# Patient Record
Sex: FEMALE | Race: WHITE | Employment: UNEMPLOYED | URBAN - METROPOLITAN AREA
[De-identification: names, ages, dates, MRNs, and addresses within clinical notes are randomized per-mention and may not be internally consistent; named-entity substitution may affect disease eponyms.]

---

## 2018-02-14 ENCOUNTER — HOSPITAL ENCOUNTER (OUTPATIENT)
Age: 58
Setting detail: OBSERVATION
Discharge: HOME | End: 2018-02-15
Attending: EMERGENCY MEDICINE | Admitting: INTERNAL MEDICINE
Payer: COMMERCIAL

## 2018-02-14 ENCOUNTER — APPOINTMENT (OUTPATIENT)
Dept: ULTRASOUND IMAGING | Age: 58
End: 2018-02-14
Payer: COMMERCIAL

## 2018-02-14 ENCOUNTER — APPOINTMENT (OUTPATIENT)
Dept: NUCLEAR MEDICINE | Age: 58
End: 2018-02-14
Payer: COMMERCIAL

## 2018-02-14 ENCOUNTER — APPOINTMENT (OUTPATIENT)
Dept: CT IMAGING | Age: 58
End: 2018-02-14
Payer: COMMERCIAL

## 2018-02-14 DIAGNOSIS — R74.8 ELEVATED ALKALINE PHOSPHATASE LEVEL: ICD-10-CM

## 2018-02-14 DIAGNOSIS — F17.200 SMOKING ADDICTION: ICD-10-CM

## 2018-02-14 DIAGNOSIS — R10.11 RUQ ABDOMINAL PAIN: Primary | ICD-10-CM

## 2018-02-14 LAB
-: ABNORMAL
ABSOLUTE EOS #: 0.1 K/UL (ref 0–0.4)
ABSOLUTE IMMATURE GRANULOCYTE: ABNORMAL K/UL (ref 0–0.3)
ABSOLUTE LYMPH #: 2.5 K/UL (ref 1–4.8)
ABSOLUTE MONO #: 0.9 K/UL (ref 0.1–1.3)
ALBUMIN SERPL-MCNC: 3.8 G/DL (ref 3.5–5.2)
ALBUMIN/GLOBULIN RATIO: ABNORMAL (ref 1–2.5)
ALP BLD-CCNC: 137 U/L (ref 35–104)
ALT SERPL-CCNC: 20 U/L (ref 5–33)
AMORPHOUS: ABNORMAL
ANION GAP SERPL CALCULATED.3IONS-SCNC: 11 MMOL/L (ref 9–17)
AST SERPL-CCNC: 21 U/L
BACTERIA: ABNORMAL
BASOPHILS # BLD: 0 % (ref 0–2)
BASOPHILS ABSOLUTE: 0 K/UL (ref 0–0.2)
BILIRUB SERPL-MCNC: 0.51 MG/DL (ref 0.3–1.2)
BILIRUBIN DIRECT: 0.14 MG/DL
BILIRUBIN URINE: NEGATIVE
BILIRUBIN, INDIRECT: 0.37 MG/DL (ref 0–1)
BUN BLDV-MCNC: 12 MG/DL (ref 6–20)
BUN/CREAT BLD: ABNORMAL (ref 9–20)
CALCIUM SERPL-MCNC: 9.1 MG/DL (ref 8.6–10.4)
CASTS UA: ABNORMAL /LPF
CHLORIDE BLD-SCNC: 102 MMOL/L (ref 98–107)
CO2: 28 MMOL/L (ref 20–31)
COLOR: YELLOW
COMMENT UA: ABNORMAL
CREAT SERPL-MCNC: 0.82 MG/DL (ref 0.5–0.9)
CRYSTALS, UA: ABNORMAL /HPF
DIFFERENTIAL TYPE: ABNORMAL
EOSINOPHILS RELATIVE PERCENT: 1 % (ref 0–4)
EPITHELIAL CELLS UA: ABNORMAL /HPF
GFR AFRICAN AMERICAN: >60 ML/MIN
GFR NON-AFRICAN AMERICAN: >60 ML/MIN
GFR SERPL CREATININE-BSD FRML MDRD: ABNORMAL ML/MIN/{1.73_M2}
GFR SERPL CREATININE-BSD FRML MDRD: ABNORMAL ML/MIN/{1.73_M2}
GLOBULIN: ABNORMAL G/DL (ref 1.5–3.8)
GLUCOSE BLD-MCNC: 123 MG/DL (ref 70–99)
GLUCOSE URINE: NEGATIVE
HCT VFR BLD CALC: 45.6 % (ref 36–46)
HEMOGLOBIN: 15.7 G/DL (ref 12–16)
IMMATURE GRANULOCYTES: ABNORMAL %
KETONES, URINE: NEGATIVE
LEUKOCYTE ESTERASE, URINE: NEGATIVE
LIPASE: 22 U/L (ref 13–60)
LYMPHOCYTES # BLD: 26 % (ref 24–44)
MCH RBC QN AUTO: 33.2 PG (ref 26–34)
MCHC RBC AUTO-ENTMCNC: 34.4 G/DL (ref 31–37)
MCV RBC AUTO: 96.6 FL (ref 80–100)
MONOCYTES # BLD: 10 % (ref 1–7)
MUCUS: ABNORMAL
NITRITE, URINE: NEGATIVE
NRBC AUTOMATED: ABNORMAL PER 100 WBC
OTHER OBSERVATIONS UA: ABNORMAL
PDW BLD-RTO: 14.5 % (ref 11.5–14.9)
PH UA: 5.5 (ref 5–8)
PLATELET # BLD: 266 K/UL (ref 150–450)
PLATELET ESTIMATE: ABNORMAL
PMV BLD AUTO: 8.8 FL (ref 6–12)
POTASSIUM SERPL-SCNC: 3.7 MMOL/L (ref 3.7–5.3)
PROTEIN UA: NEGATIVE
RBC # BLD: 4.73 M/UL (ref 4–5.2)
RBC # BLD: ABNORMAL 10*6/UL
RBC UA: ABNORMAL /HPF
RENAL EPITHELIAL, UA: ABNORMAL /HPF
SEG NEUTROPHILS: 63 % (ref 36–66)
SEGMENTED NEUTROPHILS ABSOLUTE COUNT: 6.2 K/UL (ref 1.3–9.1)
SODIUM BLD-SCNC: 141 MMOL/L (ref 135–144)
SPECIFIC GRAVITY UA: 1.02 (ref 1–1.03)
TOTAL PROTEIN: 6.9 G/DL (ref 6.4–8.3)
TRICHOMONAS: ABNORMAL
TURBIDITY: ABNORMAL
URINE HGB: ABNORMAL
UROBILINOGEN, URINE: NORMAL
WBC # BLD: 9.7 K/UL (ref 3.5–11)
WBC # BLD: ABNORMAL 10*3/UL
WBC UA: ABNORMAL /HPF
YEAST: ABNORMAL

## 2018-02-14 PROCEDURE — 96376 TX/PRO/DX INJ SAME DRUG ADON: CPT

## 2018-02-14 PROCEDURE — 78226 HEPATOBILIARY SYSTEM IMAGING: CPT

## 2018-02-14 PROCEDURE — 6360000002 HC RX W HCPCS: Performed by: HOSPITALIST

## 2018-02-14 PROCEDURE — A9537 TC99M MEBROFENIN: HCPCS | Performed by: INTERNAL MEDICINE

## 2018-02-14 PROCEDURE — 99285 EMERGENCY DEPT VISIT HI MDM: CPT

## 2018-02-14 PROCEDURE — G0378 HOSPITAL OBSERVATION PER HR: HCPCS

## 2018-02-14 PROCEDURE — 96374 THER/PROPH/DIAG INJ IV PUSH: CPT

## 2018-02-14 PROCEDURE — 74176 CT ABD & PELVIS W/O CONTRAST: CPT

## 2018-02-14 PROCEDURE — 6370000000 HC RX 637 (ALT 250 FOR IP): Performed by: HOSPITALIST

## 2018-02-14 PROCEDURE — 96375 TX/PRO/DX INJ NEW DRUG ADDON: CPT

## 2018-02-14 PROCEDURE — C9113 INJ PANTOPRAZOLE SODIUM, VIA: HCPCS | Performed by: HOSPITALIST

## 2018-02-14 PROCEDURE — 81001 URINALYSIS AUTO W/SCOPE: CPT

## 2018-02-14 PROCEDURE — 85025 COMPLETE CBC W/AUTO DIFF WBC: CPT

## 2018-02-14 PROCEDURE — 6360000002 HC RX W HCPCS: Performed by: INTERNAL MEDICINE

## 2018-02-14 PROCEDURE — 83690 ASSAY OF LIPASE: CPT

## 2018-02-14 PROCEDURE — 87086 URINE CULTURE/COLONY COUNT: CPT

## 2018-02-14 PROCEDURE — 96372 THER/PROPH/DIAG INJ SC/IM: CPT

## 2018-02-14 PROCEDURE — 76705 ECHO EXAM OF ABDOMEN: CPT

## 2018-02-14 PROCEDURE — 99223 1ST HOSP IP/OBS HIGH 75: CPT | Performed by: INTERNAL MEDICINE

## 2018-02-14 PROCEDURE — 80048 BASIC METABOLIC PNL TOTAL CA: CPT

## 2018-02-14 PROCEDURE — 80076 HEPATIC FUNCTION PANEL: CPT

## 2018-02-14 PROCEDURE — 36415 COLL VENOUS BLD VENIPUNCTURE: CPT

## 2018-02-14 PROCEDURE — 2580000003 HC RX 258: Performed by: INTERNAL MEDICINE

## 2018-02-14 PROCEDURE — 6360000002 HC RX W HCPCS: Performed by: EMERGENCY MEDICINE

## 2018-02-14 PROCEDURE — 3430000000 HC RX DIAGNOSTIC RADIOPHARMACEUTICAL: Performed by: INTERNAL MEDICINE

## 2018-02-14 RX ORDER — CITALOPRAM 40 MG/1
40 TABLET ORAL DAILY
Status: DISCONTINUED | OUTPATIENT
Start: 2018-02-14 | End: 2018-02-15 | Stop reason: HOSPADM

## 2018-02-14 RX ORDER — ASPIRIN 81 MG/1
81 TABLET ORAL DAILY
Status: DISCONTINUED | OUTPATIENT
Start: 2018-02-14 | End: 2018-02-15 | Stop reason: HOSPADM

## 2018-02-14 RX ORDER — SODIUM CHLORIDE 0.9 % (FLUSH) 0.9 %
10 SYRINGE (ML) INJECTION EVERY 12 HOURS SCHEDULED
Status: DISCONTINUED | OUTPATIENT
Start: 2018-02-14 | End: 2018-02-15 | Stop reason: HOSPADM

## 2018-02-14 RX ORDER — ATORVASTATIN CALCIUM 10 MG/1
10 TABLET, FILM COATED ORAL NIGHTLY
Status: DISCONTINUED | OUTPATIENT
Start: 2018-02-14 | End: 2018-02-15 | Stop reason: HOSPADM

## 2018-02-14 RX ORDER — SODIUM CHLORIDE 0.9 % (FLUSH) 0.9 %
10 SYRINGE (ML) INJECTION PRN
Status: DISCONTINUED | OUTPATIENT
Start: 2018-02-14 | End: 2018-02-15 | Stop reason: HOSPADM

## 2018-02-14 RX ORDER — NITROFURANTOIN 25; 75 MG/1; MG/1
100 CAPSULE ORAL
Status: ON HOLD | COMMUNITY
Start: 2018-02-05 | End: 2018-02-15 | Stop reason: HOSPADM

## 2018-02-14 RX ORDER — PANTOPRAZOLE SODIUM 40 MG/10ML
40 INJECTION, POWDER, LYOPHILIZED, FOR SOLUTION INTRAVENOUS 2 TIMES DAILY
Status: DISCONTINUED | OUTPATIENT
Start: 2018-02-14 | End: 2018-02-15 | Stop reason: HOSPADM

## 2018-02-14 RX ORDER — VENLAFAXINE HYDROCHLORIDE 75 MG/1
225 CAPSULE, EXTENDED RELEASE ORAL
Status: DISCONTINUED | OUTPATIENT
Start: 2018-02-15 | End: 2018-02-15 | Stop reason: HOSPADM

## 2018-02-14 RX ORDER — ONDANSETRON 2 MG/ML
4 INJECTION INTRAMUSCULAR; INTRAVENOUS EVERY 8 HOURS PRN
Status: DISCONTINUED | OUTPATIENT
Start: 2018-02-14 | End: 2018-02-15 | Stop reason: HOSPADM

## 2018-02-14 RX ORDER — VENLAFAXINE HYDROCHLORIDE 225 MG/1
225 TABLET, EXTENDED RELEASE ORAL
COMMUNITY

## 2018-02-14 RX ORDER — 0.9 % SODIUM CHLORIDE 0.9 %
10 VIAL (ML) INJECTION DAILY
Status: DISCONTINUED | OUTPATIENT
Start: 2018-02-14 | End: 2018-02-15 | Stop reason: HOSPADM

## 2018-02-14 RX ORDER — FENTANYL CITRATE 50 UG/ML
75 INJECTION, SOLUTION INTRAMUSCULAR; INTRAVENOUS ONCE
Status: COMPLETED | OUTPATIENT
Start: 2018-02-14 | End: 2018-02-14

## 2018-02-14 RX ORDER — ERGOCALCIFEROL 1.25 MG/1
50000 CAPSULE ORAL WEEKLY
COMMUNITY

## 2018-02-14 RX ORDER — VERAPAMIL HYDROCHLORIDE 120 MG/1
120 TABLET, FILM COATED ORAL DAILY
Status: DISCONTINUED | OUTPATIENT
Start: 2018-02-14 | End: 2018-02-15 | Stop reason: HOSPADM

## 2018-02-14 RX ORDER — FENTANYL CITRATE 50 UG/ML
50 INJECTION, SOLUTION INTRAMUSCULAR; INTRAVENOUS
Status: DISCONTINUED | OUTPATIENT
Start: 2018-02-14 | End: 2018-02-15 | Stop reason: HOSPADM

## 2018-02-14 RX ORDER — ALPRAZOLAM 0.5 MG/1
0.5 TABLET ORAL NIGHTLY PRN
COMMUNITY

## 2018-02-14 RX ORDER — FENTANYL CITRATE 50 UG/ML
25 INJECTION, SOLUTION INTRAMUSCULAR; INTRAVENOUS
Status: DISCONTINUED | OUTPATIENT
Start: 2018-02-14 | End: 2018-02-15 | Stop reason: HOSPADM

## 2018-02-14 RX ORDER — ONDANSETRON 2 MG/ML
4 INJECTION INTRAMUSCULAR; INTRAVENOUS ONCE
Status: COMPLETED | OUTPATIENT
Start: 2018-02-14 | End: 2018-02-14

## 2018-02-14 RX ORDER — SODIUM CHLORIDE 9 MG/ML
INJECTION, SOLUTION INTRAVENOUS CONTINUOUS
Status: DISCONTINUED | OUTPATIENT
Start: 2018-02-14 | End: 2018-02-15 | Stop reason: HOSPADM

## 2018-02-14 RX ORDER — ACETAMINOPHEN 325 MG/1
650 TABLET ORAL EVERY 4 HOURS PRN
Status: DISCONTINUED | OUTPATIENT
Start: 2018-02-14 | End: 2018-02-15 | Stop reason: HOSPADM

## 2018-02-14 RX ADMIN — PANTOPRAZOLE SODIUM 40 MG: 40 INJECTION, POWDER, FOR SOLUTION INTRAVENOUS at 21:51

## 2018-02-14 RX ADMIN — FENTANYL CITRATE 75 MCG: 50 INJECTION, SOLUTION INTRAMUSCULAR; INTRAVENOUS at 06:59

## 2018-02-14 RX ADMIN — ONDANSETRON 4 MG: 2 INJECTION INTRAMUSCULAR; INTRAVENOUS at 05:00

## 2018-02-14 RX ADMIN — ASPIRIN 81 MG: 81 TABLET, COATED ORAL at 14:41

## 2018-02-14 RX ADMIN — ATORVASTATIN CALCIUM 10 MG: 10 TABLET, FILM COATED ORAL at 21:51

## 2018-02-14 RX ADMIN — Medication 10 ML: at 11:03

## 2018-02-14 RX ADMIN — FENTANYL CITRATE 50 MCG: 50 INJECTION INTRAMUSCULAR; INTRAVENOUS at 21:51

## 2018-02-14 RX ADMIN — CITALOPRAM HYDROBROMIDE 40 MG: 40 TABLET ORAL at 14:41

## 2018-02-14 RX ADMIN — FENTANYL CITRATE 75 MCG: 50 INJECTION, SOLUTION INTRAMUSCULAR; INTRAVENOUS at 05:00

## 2018-02-14 RX ADMIN — Medication 10 ML: at 21:51

## 2018-02-14 RX ADMIN — SODIUM CHLORIDE: 9 INJECTION, SOLUTION INTRAVENOUS at 09:40

## 2018-02-14 RX ADMIN — ONDANSETRON 4 MG: 2 INJECTION INTRAMUSCULAR; INTRAVENOUS at 15:53

## 2018-02-14 RX ADMIN — Medication 3.2 MILLICURIE: at 11:03

## 2018-02-14 RX ADMIN — FENTANYL CITRATE 50 MCG: 50 INJECTION INTRAMUSCULAR; INTRAVENOUS at 13:37

## 2018-02-14 RX ADMIN — ENOXAPARIN SODIUM 40 MG: 40 INJECTION SUBCUTANEOUS at 09:40

## 2018-02-14 RX ADMIN — SODIUM CHLORIDE: 9 INJECTION, SOLUTION INTRAVENOUS at 16:49

## 2018-02-14 RX ADMIN — VERAPAMIL HYDROCHLORIDE 120 MG: 120 TABLET, FILM COATED ORAL at 14:41

## 2018-02-14 ASSESSMENT — ENCOUNTER SYMPTOMS
ABDOMINAL PAIN: 1
VOMITING: 0
COUGH: 0
BACK PAIN: 0
NAUSEA: 1
SHORTNESS OF BREATH: 0
DIARRHEA: 0
EYE PAIN: 0
SORE THROAT: 0

## 2018-02-14 ASSESSMENT — PAIN SCALES - GENERAL
PAINLEVEL_OUTOF10: 6
PAINLEVEL_OUTOF10: 8
PAINLEVEL_OUTOF10: 0
PAINLEVEL_OUTOF10: 8
PAINLEVEL_OUTOF10: 10

## 2018-02-14 ASSESSMENT — PAIN DESCRIPTION - LOCATION: LOCATION: ABDOMEN

## 2018-02-14 ASSESSMENT — PAIN DESCRIPTION - ORIENTATION: ORIENTATION: RIGHT;UPPER

## 2018-02-14 ASSESSMENT — PAIN DESCRIPTION - PAIN TYPE: TYPE: ACUTE PAIN

## 2018-02-14 ASSESSMENT — PAIN DESCRIPTION - DESCRIPTORS: DESCRIPTORS: SHARP

## 2018-02-14 NOTE — ED NOTES
Pt ambulates to bathroom with a steady gait. UA collected and sent to lab.         Dee Dee Johnson RN  02/14/18 8303

## 2018-02-14 NOTE — H&P
Pain.  citalopram (CELEXA) 40 MG tablet, Take 40 mg by mouth daily. Allergies:  Banana and Hydrocodone-acetaminophen    SOCIAL HISTORY:   Patient denies any smoking, alcohol or recreational drug    FAMILY HISTORY:       Problem Relation Age of Onset    Cancer Father        REVIEW OF SYSTEMS:  Constitutional: Negative for chills and fever. HENT: Negative for ear pain and sore throat. Eyes: Negative for pain and visual disturbance. Respiratory: Negative for cough and shortness of breath. Cardiovascular: Negative for chest pain. Gastrointestinal: Positive for abdominal pain and nausea. Negative for diarrhea and vomiting. Endocrine: Negative for polydipsia and polyuria. Genitourinary: Positive for flank pain. Negative for dysuria, hematuria and vaginal discharge. Musculoskeletal: Negative for arthralgias and back pain. Skin: Negative for rash. Allergic/Immunologic: Negative for food allergies. Neurological: Negative for weakness, numbness and headaches. Hematological: Does not bruise/bleed easily. Psychiatric/Behavioral: Negative for self-injury and suicidal ideas. The patient is not nervous/anxious. PHYSICAL EXAM:  BP (!) 141/77   Pulse 75   Temp 98.2 °F (36.8 °C) (Oral)   Resp 16   Ht 5' 5\" (1.651 m)   Wt 176 lb 5.9 oz (80 kg)   SpO2 94%   BMI 29.35 kg/m²   PMH:  has a past medical history of Arthritis; Asthma; and Hypertension. Surgical History:  has a past surgical history that includes lipoma resection (Right) and Appendectomy. Social History:  reports that she has been smoking Cigarettes. She has a 40.00 pack-year smoking history. She has never used smokeless tobacco. She reports that she drinks about 0.6 oz of alcohol per week . She reports that she does not use drugs.   Family History: Noncontributory at this time  Psychiatric History: Noncontributory at this time     Allergies:has No Known Allergies.         DATA:  US GALLBLADDER RUQ [[id number]] Collected: 02/14/18 4810   Updated: 02/14/18 0744    Narrative:     EXAMINATION:  RIGHT UPPER QUADRANT ULTRASOUND    2/14/2018 7:08 am    COMPARISON:  Renal stone protocol CT from 02/14/2018    HISTORY:  ORDERING SYSTEM PROVIDED HISTORY: RUQ abd pain  TECHNOLOGIST PROVIDED HISTORY:  Ordering Physician Provided Reason for Exam: ruq pain  Acuity: Acute  Type of Exam: Initial    59-year-old female with acute right upper quadrant abdominal pain    FINDINGS:  LIVER:  The liver demonstrates normal echogenicity without evidence of  intrahepatic biliary ductal dilatation. BILIARY SYSTEM:  Gallbladder is unremarkable without evidence of  pericholecystic fluid, wall thickening or stones.  Indeterminate sonographic  Szymanski's sign as the patient is on pain medication.  Gallbladder wall  thickness measures up to 1 mm. Common bile duct is within normal limits measuring up to 3 mm. RIGHT KIDNEY: Partial visualization of the right kidney demonstrates no gross  right-sided hydronephrosis. PANCREAS:  Visualized portions of the pancreas are unremarkable.  Pancreatic  body and pancreatic tail not visualized. OTHER: No evidence of right upper quadrant ascites. Impression:     Overall, grossly unremarkable right upper quadrant abdominal ultrasound. CT ABDOMEN PELVIS WO CONTRAST [[id number]] Collected: 02/14/18 0553   Updated: 02/14/18 0634    Narrative:     EXAMINATION:  CT OF THE ABDOMEN AND PELVIS WITHOUT CONTRAST 2/14/2018 5:46 am    TECHNIQUE:  CT of the abdomen and pelvis was performed without the administration of  intravenous contrast. Multiplanar reformatted images are provided for review. Dose modulation, iterative reconstruction, and/or weight based adjustment of  the mA/kV was utilized to reduce the radiation dose to as low as reasonably  achievable. COMPARISON:  None.     HISTORY:  ORDERING SYSTEM PROVIDED HISTORY: RUQ/Right flank pain  TECHNOLOGIST PROVIDED HISTORY:  Ordering Physician Provided Reason for Exam: RUQ/Right Bilirubin Urine NEGATIVE    Ketones, Urine NEGATIVE    Specific [place], UA 1.021    Urine Hgb LARGE (A)    pH, UA 5.5    Protein, UA NEGATIVE    Urobilinogen, Urine Normal    Nitrite, Urine NEGATIVE    Leukocyte Esterase, Urine NEGATIVE    Comment: Performed at [facility]: NAOMI GALO [address] [facility]. [address]    ([phone]        Urinalysis Comments NOT REPORTED   CBC Auto Differential [[id number]] (Abnormal) Collected: 02/14/18 0458   Updated: 02/14/18 0508    Specimen Source: Blood     WBC 9.7 k/uL    RBC 4.73 m/uL    Hemoglobin 15.7 g/dL    Hematocrit 45.6 %    MCV 96.6 fL    MCH 33.2 pg    MCHC 34.4 g/dL    RDW 14.5 %    Platelets 529 k/uL    MPV 8.8 fL    NRBC Automated NOT REPORTED per 100 WBC    Differential Type NOT REPORTED    Seg Neutrophils 63 %    Lymphocytes 26 %    Monocytes 10 (H) %    Eosinophils % 1 %    Basophils 0 %    Immature Granulocytes NOT REPORTED %    Segs Absolute 6.20 k/uL    Absolute Lymph # 2.50 k/uL    Absolute Mono # 0.90 k/uL    Absolute Eos # 0.10 k/uL    Basophils # 0.00 k/uL    Comment: Performed at [facility]: NAOMI GALO [address] [facility]. [address]    [phone]        Absolute Immature Granulocyte NOT REPORTED k/uL    WBC Morphology NOT REPORTED    RBC Morphology NOT REPORTED    Platelet Estimate NOT REPORTED           ASSESSMENT/PLAN:  Principal Problem:    RUQ abdominal pain  Active Problems:    HTN (hypertension)  Resolved Problems:    * No resolved hospital problems. *      - CT abdomen, ultrasound right upper quadrant, urine analysis all reviewed. No radiologic evidence that could explain the patient's symptoms so far. - Ordered HIDA scan would follow with the result. - Continue aspirin, statin. - We will continue patient's home medication verapamil  - Continue venlafaxine at home dose  - Patient has long lying history of GERD, Kyung coronado was also seen in the past on EGD.   Will start IV Protonix 40 twice a day        Sangram Brooke Santiago, PGY-2, Internal Medicine Residency Resident. [address] [facility], [facility]    I have discussed the care of Wm. Patrick MaxwellSantosh Buzz , including pertinent history and exam findings,    today with the resident. I have seen and examined the patient and the key elements of all parts of the encounter have been performed by me . I agree with the assessment, plan and orders as documented by the resident. Principal Problem:    RUQ abdominal pain  Active Problems:    HTN (hypertension)  Resolved Problems:    * No resolved hospital problems.  *  seen 2/14/18     Electronically signed by Chip Dotson MD

## 2018-02-14 NOTE — PROGRESS NOTES
Nutrition Assessment    Type and Reason for Visit: Positive Nutrition Screen, Consult (Unplanned weight loss and decreased appetite. Consult: poor intake/ appetite)    Nutrition Recommendations: Advance diet when appropriate. Patient declined offer for oral nutrition supplement. Malnutrition Assessment:  · Malnutrition Status: Meets the criteria for severe malnutrition  · Context: Acute illness or injury  · Findings of the 6 clinical characteristics of malnutrition (Minimum of 2 out of 6 clinical characteristics is required to make the diagnosis of moderate or severe Protein Calorie Malnutrition based on AND/ASPEN Guidelines):  1. Energy Intake-Less than or equal to 50%, greater than or equal to 1 month    2. Weight Loss-7.5% loss or greater,  (2 months)  3. Fat Loss-No significant subcutaneous fat loss,    4. Muscle Loss-No significant muscle mass loss,    5. Fluid Accumulation-No significant fluid accumulation, Extremities  6.  Strength-Not measured    Nutrition Diagnosis:   · Problem: Inadequate oral intake  · Etiology: related to Insufficient energy/nutrient consumption     Signs and symptoms:  as evidenced by Diet history of poor intake, Weight loss greater than or equal to 5% in 1 month (lack of appetite)    Nutrition Assessment:  · Subjective Assessment: Patient reports a weight loss of 15 lbs in the last two months related to poor po intakes and lack of appetite. Patient reports she is status post HIDA scan today. Patient denied constipation or diarrhea. Patient does have some nausea and abdominal pain. Patient declined offer for oral nutrition supplement.     · Wound Type: None  · Current Nutrition Therapies:  · Oral Diet Orders: NPO   · Oral Diet intake: NPO  · Anthropometric Measures:  · Ht: 5' 5\" (165.1 cm)   · Current Body Wt: 176 lb (79.8 kg)  · Usual Body Wt: 190 lb (86.2 kg)  · % Weight Change: 8.5%,  2 months  · Ideal Body Wt: 125 lb (56.7 kg), % Ideal Body 141%  · BMI Classification: BMI 25.0 - 29.9 Overweight  · Comparative Standards (Estimated Nutrition Needs):  · Estimated Daily Total Kcal: 6526-4375  · Estimated Daily Protein (g): 57-68    Estimated Intake vs Estimated Needs: Intake Less Than Needs    Nutrition Risk Level: High    Nutrition Interventions:   Continue current diet (ONS declined)  Continued Inpatient Monitoring    Nutrition Evaluation:   · Evaluation: Goals set   · Goals: PO intakes greater than 75% at meals    · Monitoring: Meal Intake, Weight, Diet Progression, NPO Status, Pertinent Labs, Nausea or Vomiting, Diet Tolerance, Skin Integrity    See Adult Nutrition Doc Flowsheet for more detail.      Katelyn HO RSKYE, L.D,  Clinical Dietitian  Pager # [phone]

## 2018-02-15 VITALS
HEIGHT: 65 IN | SYSTOLIC BLOOD PRESSURE: 160 MMHG | DIASTOLIC BLOOD PRESSURE: 88 MMHG | OXYGEN SATURATION: 97 % | HEART RATE: 75 BPM | TEMPERATURE: 97.7 F | BODY MASS INDEX: 29.38 KG/M2 | WEIGHT: 176.37 LBS | RESPIRATION RATE: 16 BRPM

## 2018-02-15 LAB
CULTURE: NORMAL
CULTURE: NORMAL
Lab: NORMAL
SPECIMEN DESCRIPTION: NORMAL
SPECIMEN DESCRIPTION: NORMAL
STATUS: NORMAL

## 2018-02-15 PROCEDURE — 96376 TX/PRO/DX INJ SAME DRUG ADON: CPT

## 2018-02-15 PROCEDURE — 6360000002 HC RX W HCPCS: Performed by: INTERNAL MEDICINE

## 2018-02-15 PROCEDURE — G0378 HOSPITAL OBSERVATION PER HR: HCPCS

## 2018-02-15 PROCEDURE — 2580000003 HC RX 258: Performed by: INTERNAL MEDICINE

## 2018-02-15 PROCEDURE — 96372 THER/PROPH/DIAG INJ SC/IM: CPT

## 2018-02-15 PROCEDURE — 6370000000 HC RX 637 (ALT 250 FOR IP): Performed by: HOSPITALIST

## 2018-02-15 PROCEDURE — 2580000003 HC RX 258: Performed by: HOSPITALIST

## 2018-02-15 PROCEDURE — C9113 INJ PANTOPRAZOLE SODIUM, VIA: HCPCS | Performed by: HOSPITALIST

## 2018-02-15 PROCEDURE — 6360000002 HC RX W HCPCS: Performed by: HOSPITALIST

## 2018-02-15 PROCEDURE — 99238 HOSP IP/OBS DSCHRG MGMT 30/<: CPT | Performed by: INTERNAL MEDICINE

## 2018-02-15 RX ORDER — PANTOPRAZOLE SODIUM 40 MG/1
40 TABLET, DELAYED RELEASE ORAL DAILY
Qty: 30 TABLET | Refills: 3 | Status: SHIPPED | OUTPATIENT
Start: 2018-02-15

## 2018-02-15 RX ORDER — OXYCODONE HYDROCHLORIDE AND ACETAMINOPHEN 5; 325 MG/1; MG/1
1 TABLET ORAL EVERY 6 HOURS PRN
Qty: 15 TABLET | Refills: 0 | Status: SHIPPED | OUTPATIENT
Start: 2018-02-15 | End: 2018-02-22

## 2018-02-15 RX ADMIN — FENTANYL CITRATE 50 MCG: 50 INJECTION INTRAMUSCULAR; INTRAVENOUS at 02:37

## 2018-02-15 RX ADMIN — ENOXAPARIN SODIUM 40 MG: 40 INJECTION SUBCUTANEOUS at 07:32

## 2018-02-15 RX ADMIN — SODIUM CHLORIDE 10 ML: 9 INJECTION, SOLUTION INTRAMUSCULAR; INTRAVENOUS; SUBCUTANEOUS at 09:16

## 2018-02-15 RX ADMIN — PANTOPRAZOLE SODIUM 40 MG: 40 INJECTION, POWDER, FOR SOLUTION INTRAVENOUS at 09:16

## 2018-02-15 RX ADMIN — FENTANYL CITRATE 50 MCG: 50 INJECTION INTRAMUSCULAR; INTRAVENOUS at 05:59

## 2018-02-15 RX ADMIN — SODIUM CHLORIDE: 9 INJECTION, SOLUTION INTRAVENOUS at 02:37

## 2018-02-15 RX ADMIN — CITALOPRAM HYDROBROMIDE 40 MG: 40 TABLET ORAL at 07:32

## 2018-02-15 RX ADMIN — FENTANYL CITRATE 50 MCG: 50 INJECTION INTRAMUSCULAR; INTRAVENOUS at 10:51

## 2018-02-15 RX ADMIN — VENLAFAXINE HYDROCHLORIDE 225 MG: 75 CAPSULE, EXTENDED RELEASE ORAL at 07:32

## 2018-02-15 RX ADMIN — VERAPAMIL HYDROCHLORIDE 120 MG: 120 TABLET, FILM COATED ORAL at 07:31

## 2018-02-15 RX ADMIN — ONDANSETRON 4 MG: 2 INJECTION INTRAMUSCULAR; INTRAVENOUS at 05:59

## 2018-02-15 RX ADMIN — ASPIRIN 81 MG: 81 TABLET, COATED ORAL at 07:32

## 2018-02-15 ASSESSMENT — PAIN SCALES - GENERAL
PAINLEVEL_OUTOF10: 0
PAINLEVEL_OUTOF10: 10
PAINLEVEL_OUTOF10: 7
PAINLEVEL_OUTOF10: 10
PAINLEVEL_OUTOF10: 0

## 2018-02-15 ASSESSMENT — PAIN DESCRIPTION - LOCATION
LOCATION: ABDOMEN
LOCATION: ABDOMEN

## 2018-02-15 ASSESSMENT — PAIN DESCRIPTION - DESCRIPTORS: DESCRIPTORS: SHARP

## 2018-02-15 ASSESSMENT — PAIN DESCRIPTION - PAIN TYPE
TYPE: ACUTE PAIN
TYPE: ACUTE PAIN

## 2018-02-15 ASSESSMENT — PAIN DESCRIPTION - FREQUENCY: FREQUENCY: INTERMITTENT

## 2018-02-15 ASSESSMENT — PAIN DESCRIPTION - ORIENTATION: ORIENTATION: RIGHT;UPPER

## 2018-02-15 NOTE — CONSULTS
02/14/2018    GFRAA >60 01/17/2010    LABGLOM >60 02/14/2018    GLUCOSE 123 02/14/2018     Hepatic Function Panel:    Lab Results   Component Value Date    ALKPHOS 137 02/14/2018    ALT 20 02/14/2018    AST 21 02/14/2018    PROT 6.9 02/14/2018    PROT 6.3 01/17/2010    BILITOT 0.51 02/14/2018    BILIDIR 0.14 02/14/2018    IBILI 0.37 02/14/2018    LABALBU 3.8 02/14/2018     Calcium:    Lab Results   Component Value Date    CALCIUM 9.1 02/14/2018     Magnesium:  No results found for: MG  Phosphorus:  No results found for: PHOS  PT/INR:    Lab Results   Component Value Date    PROTIME 11.0 01/17/2010    INR 0.98 01/17/2010     ABG:  No results found for: PHART, PH, LQS6NIN, PCO2, PO2ART, PO2, OPG0IQS, HCO3, BEART, BE, THGBART, THB, YDC6TIN, B8ZTPXGJ, O2SAT  Urine Culture:  No components found for: CURINE  Blood Culture:  No components found for: CBLOOD, CFUNGUSBL  Stool Culture:  No components found for: CSTOOL    RADIOLOGY:   I have personally reviewed the following films:  Ct Abdomen Pelvis Wo Contrast    Result Date: 2/14/2018  EXAMINATION: CT OF THE ABDOMEN AND PELVIS WITHOUT CONTRAST 2/14/2018 5:46 am TECHNIQUE: CT of the abdomen and pelvis was performed without the administration of intravenous contrast. Multiplanar reformatted images are provided for review. Dose modulation, iterative reconstruction, and/or weight based adjustment of the mA/kV was utilized to reduce the radiation dose to as low as reasonably achievable. COMPARISON: None. HISTORY: ORDERING SYSTEM PROVIDED HISTORY: RUQ/Right flank pain TECHNOLOGIST PROVIDED HISTORY: Ordering Physician Provided Reason for Exam: RUQ/Right flank pain Acuity: Acute Type of Exam: Initial FINDINGS: Lower Chest: Suspect hyperinflation. Minimal bandlike atelectatic change in the posterior lung bases. Organs:  The visualized portions of the liver, gallbladder, spleen, pancreas and adrenal glands appear unremarkable within the constraints of a noncontrast exam.  No biliary ductal dilatation. There is a 5 mm upper pole right renal calculus not definitively within the collecting system, adjacent renal cortical scar. There is no obstructive uropathy. The left kidney is normal in appearance. GI/Bowel: No bowel dilatation to suggest obstruction. No evidence of acute appendicitis. There is descending and sigmoid colon diverticulosis without evidence for acute diverticulitis. Pelvis: The urinary bladder appears unremarkable. The pelvic organs demonstrate no acute abnormality. Peritoneum/Retroperitoneum: The abdominal aorta is normal in caliber. The aorta is moderately atherosclerotic. There are no fluid collections. There is no free air. There is no lymphadenopathy. There is a fat containing ventral hernia, supraumbilical midline adjacent an incisional scar. Bones/Soft Tissues: No acute findings. No definite acute findings. Right renal calcification thought to be parenchymal rather than collecting system adjacent a cortical scar upper pole right kidney. There is no obstructive uropathy. Fat containing ventral hernia. Nm Hepatobiliary    Result Date: 2/14/2018  EXAMINATION: NUCLEAR MEDICINE HEPATOBILIARY SCINTIGRAPHY (HIDA SCAN) WITH EJECTION FRACTION.  02/14/2018 TECHNIQUE: Approximately 3.2 millicuries NS73B Mebrofenin (Choletec) was administered IV. Then, dynamic images of the abdomen were obtained in the anterior projection for 60 mins. A right lateral view was also obtained at 60 mins. Due to a shortage/inavailability of CCK, one can (237 ml) Ensure plus was substitued orally. Images were obtained in the [place] projection and regions of interest were drawn around the gallbladder and ejection fraction was calculated. The patient only drank 6 ounces of Ensure.  COMPARISON: Ultrasound 02/14/2018, CT 02/14/2018 HISTORY: ORDERING SYSTEM PROVIDED HISTORY: RUQ PAIN, NO FEVER, NO ELEVATED WBC TECHNOLOGIST PROVIDED HISTORY: Ordering Physician Provided Reason for Exam: RUQ right upper quadrant abdominal ultrasound. IMPRESSION:   1. Biliary dyskinesia  2. Previous abdominal surgery questionable lipoma removal from colon/bowel not exactly sure what surgery patient had  3. Fat-containing incisional hernia  4. No evidence of acute surgical abdomen  5. Unremarkable blood work     does not have any pertinent problems on file. PLAN:   1. Diet as tolerated  2. Elective cholecystectomy with incisional hernia repair on an outpatient basis  3. Patient can be discharged from my standpoint  4. Discussed with nursing staff and the patient. Patient is in agreement. 5. Please assess the patient for medical clearance prior to discharge so I can schedule the patient accordingly. Thank you for this interesting consult and for allowing us to participate in the care of this patient. If you have any questions please don't hesitate to call.           Electronically signed by Nati Posada MD  on 2/15/2018 at 4:59 AM

## 2018-02-15 NOTE — PROGRESS NOTES
BUN  12   CREATININE  0.82     BNP: No results for input(s): BNP in the last 72 hours. PT/INR: No results for input(s): PROTIME, INR in the last 72 hours. APTT: No results for input(s): APTT in the last 72 hours. CARDIAC ENZYMES: No results for input(s): CKMB, CKMBINDEX, TROPONINI in the last 72 hours. Invalid input(s): CKTOTAL;3  FASTING LIPID PANEL:  Lab Results   Component Value Date    CHOL 207 (H) 09/09/2015    HDL 34 (L) 09/09/2015    TRIG 210 (H) 09/09/2015     LIVER PROFILE:   Recent Labs      02/14/18   0458   AST  21   ALT  20   BILIDIR  0.14   BILITOT  0.51   ALKPHOS  137*        Assessment and Plan:   . Principal Problem:    RUQ abdominal pain  Active Problems:    HTN (hypertension)  Resolved Problems:    * No resolved hospital problems. *  1. All Labs and imaging discussed. Patient symptoms likely due to iliary Dyskinesia   2. Elective cholecystectomy with incisional hernia repair on an outpatient basis  3. Patient to follow Dr Shauna Matthews with [address] [facility], PGY-2, Internal Medicine Residency Resident. [address] [place] [address]    I have discussed the care of Wm. Patrick Gerber , including pertinent history and exam findings,    today with the resident. I have seen and examined the patient and the key elements of all parts of the encounter have been performed by me . I agree with the assessment, plan and orders as documented by the resident. Principal Problem:    RUQ abdominal pain  Active Problems:    HTN (hypertension)  Resolved Problems:    * No resolved hospital problems.  *       Electronically signed by Evalee Bloch, MD

## 2018-02-19 ENCOUNTER — HOSPITAL ENCOUNTER (EMERGENCY)
Age: 58
Discharge: HOME | End: 2018-02-19
Attending: EMERGENCY MEDICINE
Payer: COMMERCIAL

## 2018-02-19 ENCOUNTER — APPOINTMENT (OUTPATIENT)
Dept: CT IMAGING | Age: 58
End: 2018-02-19
Payer: COMMERCIAL

## 2018-02-19 VITALS
WEIGHT: 176 LBS | DIASTOLIC BLOOD PRESSURE: 79 MMHG | OXYGEN SATURATION: 93 % | SYSTOLIC BLOOD PRESSURE: 166 MMHG | BODY MASS INDEX: 29.32 KG/M2 | TEMPERATURE: 98.6 F | HEIGHT: 65 IN | HEART RATE: 74 BPM | RESPIRATION RATE: 17 BRPM

## 2018-02-19 DIAGNOSIS — R10.9 ABDOMINAL PAIN, UNSPECIFIED ABDOMINAL LOCATION: Primary | ICD-10-CM

## 2018-02-19 LAB
-: NORMAL
ABSOLUTE EOS #: 0.1 K/UL (ref 0–0.4)
ABSOLUTE IMMATURE GRANULOCYTE: ABNORMAL K/UL (ref 0–0.3)
ABSOLUTE LYMPH #: 2.9 K/UL (ref 1–4.8)
ABSOLUTE MONO #: 0.8 K/UL (ref 0.1–1.3)
ALBUMIN SERPL-MCNC: 3.5 G/DL (ref 3.5–5.2)
ALBUMIN/GLOBULIN RATIO: ABNORMAL (ref 1–2.5)
ALP BLD-CCNC: 123 U/L (ref 35–104)
ALT SERPL-CCNC: 15 U/L (ref 5–33)
AMORPHOUS: NORMAL
ANION GAP SERPL CALCULATED.3IONS-SCNC: 11 MMOL/L (ref 9–17)
AST SERPL-CCNC: 17 U/L
BACTERIA: NORMAL
BASOPHILS # BLD: 1 % (ref 0–2)
BASOPHILS ABSOLUTE: 0 K/UL (ref 0–0.2)
BILIRUB SERPL-MCNC: 0.31 MG/DL (ref 0.3–1.2)
BILIRUBIN URINE: NEGATIVE
BUN BLDV-MCNC: 8 MG/DL (ref 6–20)
BUN/CREAT BLD: ABNORMAL (ref 9–20)
CALCIUM SERPL-MCNC: 9.1 MG/DL (ref 8.6–10.4)
CASTS UA: NORMAL /LPF
CHLORIDE BLD-SCNC: 103 MMOL/L (ref 98–107)
CO2: 28 MMOL/L (ref 20–31)
COLOR: YELLOW
COMMENT UA: ABNORMAL
CREAT SERPL-MCNC: 0.8 MG/DL (ref 0.5–0.9)
CRYSTALS, UA: NORMAL /HPF
DIFFERENTIAL TYPE: ABNORMAL
EKG ATRIAL RATE: 79 BPM
EKG P AXIS: 69 DEGREES
EKG P-R INTERVAL: 156 MS
EKG Q-T INTERVAL: 370 MS
EKG QRS DURATION: 94 MS
EKG QTC CALCULATION (BAZETT): 424 MS
EKG R AXIS: 75 DEGREES
EKG T AXIS: 60 DEGREES
EKG VENTRICULAR RATE: 79 BPM
EOSINOPHILS RELATIVE PERCENT: 1 % (ref 0–4)
EPITHELIAL CELLS UA: NORMAL /HPF
GFR AFRICAN AMERICAN: >60 ML/MIN
GFR NON-AFRICAN AMERICAN: >60 ML/MIN
GFR SERPL CREATININE-BSD FRML MDRD: ABNORMAL ML/MIN/{1.73_M2}
GFR SERPL CREATININE-BSD FRML MDRD: ABNORMAL ML/MIN/{1.73_M2}
GLUCOSE BLD-MCNC: 112 MG/DL (ref 70–99)
GLUCOSE URINE: NEGATIVE
HCT VFR BLD CALC: 42.7 % (ref 36–46)
HEMOGLOBIN: 14.4 G/DL (ref 12–16)
IMMATURE GRANULOCYTES: ABNORMAL %
KETONES, URINE: NEGATIVE
LEUKOCYTE ESTERASE, URINE: NEGATIVE
LIPASE: 16 U/L (ref 13–60)
LYMPHOCYTES # BLD: 34 % (ref 24–44)
MAGNESIUM: 2.3 MG/DL (ref 1.6–2.6)
MCH RBC QN AUTO: 33.2 PG (ref 26–34)
MCHC RBC AUTO-ENTMCNC: 33.7 G/DL (ref 31–37)
MCV RBC AUTO: 98.5 FL (ref 80–100)
MONOCYTES # BLD: 9 % (ref 1–7)
MUCUS: NORMAL
NITRITE, URINE: NEGATIVE
NRBC AUTOMATED: ABNORMAL PER 100 WBC
OTHER OBSERVATIONS UA: NORMAL
PDW BLD-RTO: 14.9 % (ref 11.5–14.9)
PH UA: 6.5 (ref 5–8)
PLATELET # BLD: 252 K/UL (ref 150–450)
PLATELET ESTIMATE: ABNORMAL
PMV BLD AUTO: 10 FL (ref 6–12)
POTASSIUM SERPL-SCNC: 4.1 MMOL/L (ref 3.7–5.3)
PROTEIN UA: NEGATIVE
RBC # BLD: 4.34 M/UL (ref 4–5.2)
RBC # BLD: ABNORMAL 10*6/UL
RBC UA: NORMAL /HPF
RENAL EPITHELIAL, UA: NORMAL /HPF
SEG NEUTROPHILS: 55 % (ref 36–66)
SEGMENTED NEUTROPHILS ABSOLUTE COUNT: 4.7 K/UL (ref 1.3–9.1)
SODIUM BLD-SCNC: 142 MMOL/L (ref 135–144)
SPECIFIC GRAVITY UA: 1.06 (ref 1–1.03)
TOTAL PROTEIN: 6.4 G/DL (ref 6.4–8.3)
TRICHOMONAS: NORMAL
TROPONIN INTERP: NORMAL
TROPONIN INTERP: NORMAL
TROPONIN T: <0.03 NG/ML
TROPONIN T: <0.03 NG/ML
TURBIDITY: CLEAR
URINE HGB: ABNORMAL
UROBILINOGEN, URINE: NORMAL
WBC # BLD: 8.5 K/UL (ref 3.5–11)
WBC # BLD: ABNORMAL 10*3/UL
WBC UA: NORMAL /HPF
YEAST: NORMAL

## 2018-02-19 PROCEDURE — S0028 INJECTION, FAMOTIDINE, 20 MG: HCPCS | Performed by: EMERGENCY MEDICINE

## 2018-02-19 PROCEDURE — 2580000003 HC RX 258: Performed by: EMERGENCY MEDICINE

## 2018-02-19 PROCEDURE — 96374 THER/PROPH/DIAG INJ IV PUSH: CPT

## 2018-02-19 PROCEDURE — 99284 EMERGENCY DEPT VISIT MOD MDM: CPT

## 2018-02-19 PROCEDURE — 6360000002 HC RX W HCPCS: Performed by: EMERGENCY MEDICINE

## 2018-02-19 PROCEDURE — 93005 ELECTROCARDIOGRAM TRACING: CPT

## 2018-02-19 PROCEDURE — 84484 ASSAY OF TROPONIN QUANT: CPT

## 2018-02-19 PROCEDURE — 80053 COMPREHEN METABOLIC PANEL: CPT

## 2018-02-19 PROCEDURE — 2500000003 HC RX 250 WO HCPCS: Performed by: EMERGENCY MEDICINE

## 2018-02-19 PROCEDURE — 81001 URINALYSIS AUTO W/SCOPE: CPT

## 2018-02-19 PROCEDURE — 6360000004 HC RX CONTRAST MEDICATION: Performed by: EMERGENCY MEDICINE

## 2018-02-19 PROCEDURE — 83690 ASSAY OF LIPASE: CPT

## 2018-02-19 PROCEDURE — 96375 TX/PRO/DX INJ NEW DRUG ADDON: CPT

## 2018-02-19 PROCEDURE — 85025 COMPLETE CBC W/AUTO DIFF WBC: CPT

## 2018-02-19 PROCEDURE — 36415 COLL VENOUS BLD VENIPUNCTURE: CPT

## 2018-02-19 PROCEDURE — 74177 CT ABD & PELVIS W/CONTRAST: CPT

## 2018-02-19 PROCEDURE — 83735 ASSAY OF MAGNESIUM: CPT

## 2018-02-19 RX ORDER — FENTANYL CITRATE 50 UG/ML
50 INJECTION, SOLUTION INTRAMUSCULAR; INTRAVENOUS ONCE
Status: COMPLETED | OUTPATIENT
Start: 2018-02-19 | End: 2018-02-19

## 2018-02-19 RX ORDER — POLYETHYLENE GLYCOL 3350 17 G/17G
17 POWDER, FOR SOLUTION ORAL DAILY
Qty: 527 G | Refills: 1 | Status: SHIPPED | OUTPATIENT
Start: 2018-02-19 | End: 2018-03-21

## 2018-02-19 RX ORDER — ONDANSETRON 2 MG/ML
8 INJECTION INTRAMUSCULAR; INTRAVENOUS ONCE
Status: COMPLETED | OUTPATIENT
Start: 2018-02-19 | End: 2018-02-19

## 2018-02-19 RX ORDER — 0.9 % SODIUM CHLORIDE 0.9 %
100 INTRAVENOUS SOLUTION INTRAVENOUS ONCE
Status: COMPLETED | OUTPATIENT
Start: 2018-02-19 | End: 2018-02-19

## 2018-02-19 RX ORDER — SODIUM CHLORIDE 0.9 % (FLUSH) 0.9 %
10 SYRINGE (ML) INJECTION PRN
Status: DISCONTINUED | OUTPATIENT
Start: 2018-02-19 | End: 2018-02-19 | Stop reason: HOSPADM

## 2018-02-19 RX ORDER — TRAMADOL HYDROCHLORIDE 50 MG/1
50 TABLET ORAL EVERY 6 HOURS PRN
Qty: 20 TABLET | Refills: 0 | Status: SHIPPED | OUTPATIENT
Start: 2018-02-19 | End: 2018-02-24

## 2018-02-19 RX ADMIN — FAMOTIDINE 20 MG: 10 INJECTION, SOLUTION INTRAVENOUS at 08:57

## 2018-02-19 RX ADMIN — FENTANYL CITRATE 50 MCG: 50 INJECTION, SOLUTION INTRAMUSCULAR; INTRAVENOUS at 08:56

## 2018-02-19 RX ADMIN — ONDANSETRON 8 MG: 2 INJECTION INTRAMUSCULAR; INTRAVENOUS at 08:55

## 2018-02-19 RX ADMIN — Medication 10 ML: at 09:14

## 2018-02-19 RX ADMIN — IOPAMIDOL 100 ML: 755 INJECTION, SOLUTION INTRAVENOUS at 09:14

## 2018-02-19 RX ADMIN — SODIUM CHLORIDE 100 ML: 9 INJECTION, SOLUTION INTRAVENOUS at 09:14

## 2018-02-19 ASSESSMENT — ENCOUNTER SYMPTOMS
DIARRHEA: 0
HEMATOCHEZIA: 0
VOMITING: 1
ABDOMINAL PAIN: 1
NAUSEA: 1
HEMATEMESIS: 0
CONSTIPATION: 1

## 2018-02-19 ASSESSMENT — PAIN SCALES - GENERAL
PAINLEVEL_OUTOF10: 10
PAINLEVEL_OUTOF10: 10

## 2018-02-19 NOTE — ED PROVIDER NOTES
for Pain for up to 7 days. Earliest Fill Date: 2/15/18, Disp-15 tablet, R-0Print      ALPRAZolam (XANAX) 0.5 MG tablet Take 0.5 mg by mouth nightly as needed for Sleep. Historical Med      !! vitamin D (ERGOCALCIFEROL) 25083 units CAPS capsule Take 50,000 Units by mouth once a weekHistorical Med      !! VITAMIN D, ERGOCALCIFEROL, PO Take 1.25 mg by mouthHistorical Med      venlafaxine 225 MG extended release tablet Take 225 mg by mouth daily (with breakfast)Historical Med      atorvastatin (LIPITOR) 10 MG tablet Take 1 tablet by mouth nightly, Disp-30 tablet, R-3      nicotine (NICODERM CQ) 21 MG/24HR Place 1 patch onto the skin daily, Disp-30 patch, R-3      verapamil (CALAN) 120 MG tablet Take 120 mg by mouth daily Take 1 1/2 tabs daily      citalopram (CELEXA) 40 MG tablet Take 40 mg by mouth daily. aspirin 81 MG tablet Take 81 mg by mouth daily. !! - Potential duplicate medications found. Please discuss with provider. ALLERGIES     is allergic to banana and hydrocodone-acetaminophen. FAMILY HISTORY     indicated that her mother is alive. She indicated that her father is . SOCIAL HISTORY      reports that she has been smoking Cigarettes. She has a 40.00 pack-year smoking history. She has never used smokeless tobacco. She reports that she drinks about 0.6 oz of alcohol per week . She reports that she does not use drugs. PHYSICAL EXAM     INITIAL VITALS: BP (!) 166/79   Pulse 74   Temp 98.6 °F (37 °C)   Resp 17   Ht 5' 5\" (1.651 m)   Wt 176 lb (79.8 kg)   SpO2 93%   BMI 29.29 kg/m²    Physical Exam   Constitutional: She is oriented to person, place, and time. She appears well-developed and well-nourished. No distress. HENT:   Head: Normocephalic and atraumatic. Nose: Nose normal.   Eyes: Conjunctivae and EOM are normal. Pupils are equal, round, and reactive to light. Right eye exhibits no discharge. Left eye exhibits no discharge. Neck: Normal range of motion.  Neck absence of a cardiologist.  Normal sinus rhythm, no ST elevation, there is approximately 1 mm ST depression in leads 2,3, aVF and V4 V5 V6 there is a half millimeter depression. No ST elevations. Ventricular rate 79 bpm, MO interval 156, QRS 94, . Compared to old EKG in those ST depressions with her previously, her EKG is unchanged compared to old. RADIOLOGY:All plain film, CT, MRI, and formal ultrasound images (except ED bedside ultrasound) are read by the radiologist, see reports below, unless otherwise noted in MDM or here. CT ABDOMEN PELVIS W IV CONTRAST Additional Contrast? None   Final Result   No evidence of acute process within the abdomen or pelvis with stable chronic   findings as detailed above. LABS: All lab results were reviewed by myself, and all abnormals are listed below.   Labs Reviewed   CBC WITH AUTO DIFFERENTIAL - Abnormal; Notable for the following:        Result Value    Monocytes 9 (*)     All other components within normal limits   COMPREHENSIVE METABOLIC PANEL - Abnormal; Notable for the following:     Glucose 112 (*)     Alkaline Phosphatase 123 (*)     All other components within normal limits   URINALYSIS - Abnormal; Notable for the following:     Specific Gravity, UA 1.065 (*)     Urine Hgb TRACE (*)     All other components within normal limits   LIPASE   MAGNESIUM   TROPONIN   TROPONIN   MICROSCOPIC URINALYSIS     EMERGENCY DEPARTMENT COURSE:     Vitals:    Vitals:    02/19/18 1045 02/19/18 1100 02/19/18 1115 02/19/18 1142   BP: (!) 160/85 (!) 153/86 (!) 166/84 (!) 166/79   Pulse: 77 79 78 74   Resp: 14 16 21 17   Temp:       SpO2: 98% 98% 98% 93%   Weight:       Height:         The patient was given the following medications while in the emergency department:  Orders Placed This Encounter   Medications    famotidine (PEPCID) injection 20 mg    ondansetron (ZOFRAN) injection 8 mg    fentaNYL (SUBLIMAZE) injection 50 mcg    iopamidol (ISOVUE-370) 76 %

## 2018-02-23 ENCOUNTER — APPOINTMENT (OUTPATIENT)
Dept: GENERAL RADIOLOGY | Age: 58
DRG: 263 | End: 2018-02-23
Payer: COMMERCIAL

## 2018-02-23 ENCOUNTER — HOSPITAL ENCOUNTER (INPATIENT)
Age: 58
LOS: 1 days | Discharge: HOME | DRG: 263 | End: 2018-02-24
Attending: EMERGENCY MEDICINE | Admitting: SURGERY
Payer: COMMERCIAL

## 2018-02-23 DIAGNOSIS — K81.9 CHOLECYSTITIS: ICD-10-CM

## 2018-02-23 DIAGNOSIS — R10.11 RUQ ABDOMINAL PAIN: Primary | ICD-10-CM

## 2018-02-23 LAB
ABSOLUTE EOS #: 0.1 K/UL (ref 0–0.4)
ABSOLUTE IMMATURE GRANULOCYTE: ABNORMAL K/UL (ref 0–0.3)
ABSOLUTE LYMPH #: 1.9 K/UL (ref 1–4.8)
ABSOLUTE MONO #: 0.8 K/UL (ref 0.1–1.3)
ALBUMIN SERPL-MCNC: 3.6 G/DL (ref 3.5–5.2)
ALBUMIN/GLOBULIN RATIO: ABNORMAL (ref 1–2.5)
ALP BLD-CCNC: 136 U/L (ref 35–104)
ALT SERPL-CCNC: 15 U/L (ref 5–33)
ANION GAP SERPL CALCULATED.3IONS-SCNC: 13 MMOL/L (ref 9–17)
AST SERPL-CCNC: 16 U/L
BASOPHILS # BLD: 1 % (ref 0–2)
BASOPHILS ABSOLUTE: 0 K/UL (ref 0–0.2)
BILIRUB SERPL-MCNC: 0.26 MG/DL (ref 0.3–1.2)
BILIRUBIN DIRECT: 0.09 MG/DL
BILIRUBIN, INDIRECT: 0.17 MG/DL (ref 0–1)
BUN BLDV-MCNC: 10 MG/DL (ref 6–20)
BUN/CREAT BLD: ABNORMAL (ref 9–20)
CALCIUM SERPL-MCNC: 9 MG/DL (ref 8.6–10.4)
CHLORIDE BLD-SCNC: 101 MMOL/L (ref 98–107)
CO2: 25 MMOL/L (ref 20–31)
CREAT SERPL-MCNC: 0.84 MG/DL (ref 0.5–0.9)
DIFFERENTIAL TYPE: ABNORMAL
EKG ATRIAL RATE: 73 BPM
EKG P AXIS: 60 DEGREES
EKG P-R INTERVAL: 168 MS
EKG Q-T INTERVAL: 378 MS
EKG QRS DURATION: 96 MS
EKG QTC CALCULATION (BAZETT): 416 MS
EKG R AXIS: 67 DEGREES
EKG T AXIS: 51 DEGREES
EKG VENTRICULAR RATE: 73 BPM
EOSINOPHILS RELATIVE PERCENT: 2 % (ref 0–4)
GFR AFRICAN AMERICAN: >60 ML/MIN
GFR NON-AFRICAN AMERICAN: >60 ML/MIN
GFR SERPL CREATININE-BSD FRML MDRD: ABNORMAL ML/MIN/{1.73_M2}
GFR SERPL CREATININE-BSD FRML MDRD: ABNORMAL ML/MIN/{1.73_M2}
GLOBULIN: ABNORMAL G/DL (ref 1.5–3.8)
GLUCOSE BLD-MCNC: 133 MG/DL (ref 70–99)
HCT VFR BLD CALC: 44.4 % (ref 36–46)
HEMOGLOBIN: 15 G/DL (ref 12–16)
IMMATURE GRANULOCYTES: ABNORMAL %
INR BLD: 1
LIPASE: 16 U/L (ref 13–60)
LYMPHOCYTES # BLD: 26 % (ref 24–44)
MAGNESIUM: 2.1 MG/DL (ref 1.6–2.6)
MCH RBC QN AUTO: 32.7 PG (ref 26–34)
MCHC RBC AUTO-ENTMCNC: 33.8 G/DL (ref 31–37)
MCV RBC AUTO: 96.9 FL (ref 80–100)
MONOCYTES # BLD: 12 % (ref 1–7)
NRBC AUTOMATED: ABNORMAL PER 100 WBC
PDW BLD-RTO: 14.5 % (ref 11.5–14.9)
PLATELET # BLD: 275 K/UL (ref 150–450)
PLATELET ESTIMATE: ABNORMAL
PMV BLD AUTO: 9.4 FL (ref 6–12)
POTASSIUM SERPL-SCNC: 3.1 MMOL/L (ref 3.7–5.3)
PROTHROMBIN TIME: 10.4 SEC (ref 9.7–12)
RBC # BLD: 4.58 M/UL (ref 4–5.2)
RBC # BLD: ABNORMAL 10*6/UL
SEG NEUTROPHILS: 59 % (ref 36–66)
SEGMENTED NEUTROPHILS ABSOLUTE COUNT: 4.4 K/UL (ref 1.3–9.1)
SODIUM BLD-SCNC: 139 MMOL/L (ref 135–144)
TOTAL PROTEIN: 6.6 G/DL (ref 6.4–8.3)
WBC # BLD: 7.4 K/UL (ref 3.5–11)
WBC # BLD: ABNORMAL 10*3/UL

## 2018-02-23 PROCEDURE — 2580000003 HC RX 258: Performed by: SURGERY

## 2018-02-23 PROCEDURE — 96376 TX/PRO/DX INJ SAME DRUG ADON: CPT

## 2018-02-23 PROCEDURE — 99285 EMERGENCY DEPT VISIT HI MDM: CPT

## 2018-02-23 PROCEDURE — 96374 THER/PROPH/DIAG INJ IV PUSH: CPT

## 2018-02-23 PROCEDURE — 1200000000 HC SEMI PRIVATE

## 2018-02-23 PROCEDURE — 74022 RADEX COMPL AQT ABD SERIES: CPT

## 2018-02-23 PROCEDURE — 85610 PROTHROMBIN TIME: CPT

## 2018-02-23 PROCEDURE — G0378 HOSPITAL OBSERVATION PER HR: HCPCS

## 2018-02-23 PROCEDURE — 6360000002 HC RX W HCPCS: Performed by: EMERGENCY MEDICINE

## 2018-02-23 PROCEDURE — 2580000003 HC RX 258: Performed by: EMERGENCY MEDICINE

## 2018-02-23 PROCEDURE — 80048 BASIC METABOLIC PNL TOTAL CA: CPT

## 2018-02-23 PROCEDURE — 6370000000 HC RX 637 (ALT 250 FOR IP): Performed by: INTERNAL MEDICINE

## 2018-02-23 PROCEDURE — 83690 ASSAY OF LIPASE: CPT

## 2018-02-23 PROCEDURE — 96375 TX/PRO/DX INJ NEW DRUG ADDON: CPT

## 2018-02-23 PROCEDURE — 93005 ELECTROCARDIOGRAM TRACING: CPT

## 2018-02-23 PROCEDURE — 80076 HEPATIC FUNCTION PANEL: CPT

## 2018-02-23 PROCEDURE — 36415 COLL VENOUS BLD VENIPUNCTURE: CPT

## 2018-02-23 PROCEDURE — 85025 COMPLETE CBC W/AUTO DIFF WBC: CPT

## 2018-02-23 PROCEDURE — 6360000002 HC RX W HCPCS: Performed by: SURGERY

## 2018-02-23 PROCEDURE — 83735 ASSAY OF MAGNESIUM: CPT

## 2018-02-23 RX ORDER — SODIUM CHLORIDE 0.9 % (FLUSH) 0.9 %
10 SYRINGE (ML) INJECTION EVERY 12 HOURS SCHEDULED
Status: DISCONTINUED | OUTPATIENT
Start: 2018-02-23 | End: 2018-02-24 | Stop reason: HOSPADM

## 2018-02-23 RX ORDER — ALPRAZOLAM 0.5 MG/1
0.5 TABLET ORAL NIGHTLY PRN
Status: DISCONTINUED | OUTPATIENT
Start: 2018-02-23 | End: 2018-02-24 | Stop reason: HOSPADM

## 2018-02-23 RX ORDER — PANTOPRAZOLE SODIUM 40 MG/1
40 TABLET, DELAYED RELEASE ORAL DAILY
Status: DISCONTINUED | OUTPATIENT
Start: 2018-02-23 | End: 2018-02-24 | Stop reason: HOSPADM

## 2018-02-23 RX ORDER — SODIUM CHLORIDE 9 MG/ML
INJECTION, SOLUTION INTRAVENOUS CONTINUOUS
Status: DISCONTINUED | OUTPATIENT
Start: 2018-02-23 | End: 2018-02-24 | Stop reason: HOSPADM

## 2018-02-23 RX ORDER — SODIUM CHLORIDE 0.9 % (FLUSH) 0.9 %
10 SYRINGE (ML) INJECTION PRN
Status: DISCONTINUED | OUTPATIENT
Start: 2018-02-23 | End: 2018-02-24 | Stop reason: HOSPADM

## 2018-02-23 RX ORDER — FENTANYL CITRATE 50 UG/ML
50 INJECTION, SOLUTION INTRAMUSCULAR; INTRAVENOUS
Status: DISCONTINUED | OUTPATIENT
Start: 2018-02-23 | End: 2018-02-24 | Stop reason: HOSPADM

## 2018-02-23 RX ORDER — MORPHINE SULFATE 2 MG/ML
2 INJECTION, SOLUTION INTRAMUSCULAR; INTRAVENOUS
Status: DISCONTINUED | OUTPATIENT
Start: 2018-02-23 | End: 2018-02-23

## 2018-02-23 RX ORDER — ONDANSETRON 2 MG/ML
4 INJECTION INTRAMUSCULAR; INTRAVENOUS ONCE
Status: COMPLETED | OUTPATIENT
Start: 2018-02-23 | End: 2018-02-23

## 2018-02-23 RX ORDER — ONDANSETRON 2 MG/ML
4 INJECTION INTRAMUSCULAR; INTRAVENOUS EVERY 6 HOURS PRN
Status: DISCONTINUED | OUTPATIENT
Start: 2018-02-23 | End: 2018-02-24 | Stop reason: HOSPADM

## 2018-02-23 RX ORDER — POTASSIUM CHLORIDE 20 MEQ/1
40 TABLET, EXTENDED RELEASE ORAL PRN
Status: DISCONTINUED | OUTPATIENT
Start: 2018-02-23 | End: 2018-02-24 | Stop reason: HOSPADM

## 2018-02-23 RX ORDER — POLYETHYLENE GLYCOL 3350 17 G/17G
17 POWDER, FOR SOLUTION ORAL DAILY
Status: DISCONTINUED | OUTPATIENT
Start: 2018-02-23 | End: 2018-02-24 | Stop reason: HOSPADM

## 2018-02-23 RX ORDER — ONDANSETRON 2 MG/ML
4 INJECTION INTRAMUSCULAR; INTRAVENOUS EVERY 8 HOURS PRN
Status: DISCONTINUED | OUTPATIENT
Start: 2018-02-23 | End: 2018-02-23

## 2018-02-23 RX ORDER — VENLAFAXINE HYDROCHLORIDE 75 MG/1
225 CAPSULE, EXTENDED RELEASE ORAL
Status: DISCONTINUED | OUTPATIENT
Start: 2018-02-24 | End: 2018-02-24 | Stop reason: HOSPADM

## 2018-02-23 RX ORDER — FENTANYL CITRATE 50 UG/ML
100 INJECTION, SOLUTION INTRAMUSCULAR; INTRAVENOUS
Status: DISCONTINUED | OUTPATIENT
Start: 2018-02-23 | End: 2018-02-24 | Stop reason: HOSPADM

## 2018-02-23 RX ORDER — MORPHINE SULFATE 4 MG/ML
4 INJECTION, SOLUTION INTRAMUSCULAR; INTRAVENOUS ONCE
Status: COMPLETED | OUTPATIENT
Start: 2018-02-23 | End: 2018-02-23

## 2018-02-23 RX ORDER — ACETAMINOPHEN 325 MG/1
650 TABLET ORAL EVERY 4 HOURS PRN
Status: DISCONTINUED | OUTPATIENT
Start: 2018-02-23 | End: 2018-02-24 | Stop reason: HOSPADM

## 2018-02-23 RX ORDER — POTASSIUM CHLORIDE 20MEQ/15ML
40 LIQUID (ML) ORAL PRN
Status: DISCONTINUED | OUTPATIENT
Start: 2018-02-23 | End: 2018-02-24 | Stop reason: HOSPADM

## 2018-02-23 RX ORDER — VERAPAMIL HYDROCHLORIDE 120 MG/1
120 TABLET, FILM COATED ORAL DAILY
Status: DISCONTINUED | OUTPATIENT
Start: 2018-02-23 | End: 2018-02-24 | Stop reason: HOSPADM

## 2018-02-23 RX ORDER — ATORVASTATIN CALCIUM 10 MG/1
10 TABLET, FILM COATED ORAL NIGHTLY
Status: DISCONTINUED | OUTPATIENT
Start: 2018-02-23 | End: 2018-02-24 | Stop reason: HOSPADM

## 2018-02-23 RX ORDER — 0.9 % SODIUM CHLORIDE 0.9 %
1000 INTRAVENOUS SOLUTION INTRAVENOUS ONCE
Status: COMPLETED | OUTPATIENT
Start: 2018-02-23 | End: 2018-02-23

## 2018-02-23 RX ORDER — MORPHINE SULFATE 4 MG/ML
4 INJECTION, SOLUTION INTRAMUSCULAR; INTRAVENOUS
Status: DISCONTINUED | OUTPATIENT
Start: 2018-02-23 | End: 2018-02-23

## 2018-02-23 RX ORDER — CITALOPRAM 40 MG/1
40 TABLET ORAL DAILY
Status: DISCONTINUED | OUTPATIENT
Start: 2018-02-23 | End: 2018-02-24 | Stop reason: HOSPADM

## 2018-02-23 RX ORDER — POTASSIUM CHLORIDE 7.45 MG/ML
10 INJECTION INTRAVENOUS PRN
Status: DISCONTINUED | OUTPATIENT
Start: 2018-02-23 | End: 2018-02-24 | Stop reason: HOSPADM

## 2018-02-23 RX ADMIN — SODIUM CHLORIDE: 9 INJECTION, SOLUTION INTRAVENOUS at 14:15

## 2018-02-23 RX ADMIN — POTASSIUM CHLORIDE 40 MEQ: 20 TABLET, EXTENDED RELEASE ORAL at 18:45

## 2018-02-23 RX ADMIN — MORPHINE SULFATE 2 MG: 2 INJECTION, SOLUTION INTRAMUSCULAR; INTRAVENOUS at 14:53

## 2018-02-23 RX ADMIN — MORPHINE SULFATE 4 MG: 4 INJECTION, SOLUTION INTRAMUSCULAR; INTRAVENOUS at 07:58

## 2018-02-23 RX ADMIN — ONDANSETRON 4 MG: 2 INJECTION INTRAMUSCULAR; INTRAVENOUS at 17:48

## 2018-02-23 RX ADMIN — FENTANYL CITRATE 100 MCG: 50 INJECTION, SOLUTION INTRAMUSCULAR; INTRAVENOUS at 19:15

## 2018-02-23 RX ADMIN — MORPHINE SULFATE 4 MG: 4 INJECTION, SOLUTION INTRAMUSCULAR; INTRAVENOUS at 12:22

## 2018-02-23 RX ADMIN — ONDANSETRON 4 MG: 2 INJECTION INTRAMUSCULAR; INTRAVENOUS at 07:58

## 2018-02-23 RX ADMIN — SODIUM CHLORIDE 1000 ML: 9 INJECTION, SOLUTION INTRAVENOUS at 07:58

## 2018-02-23 RX ADMIN — FENTANYL CITRATE 100 MCG: 50 INJECTION, SOLUTION INTRAMUSCULAR; INTRAVENOUS at 22:22

## 2018-02-23 ASSESSMENT — PAIN DESCRIPTION - LOCATION
LOCATION: ABDOMEN
LOCATION: ABDOMEN

## 2018-02-23 ASSESSMENT — PAIN DESCRIPTION - PAIN TYPE
TYPE: ACUTE PAIN

## 2018-02-23 ASSESSMENT — PAIN SCALES - GENERAL
PAINLEVEL_OUTOF10: 7
PAINLEVEL_OUTOF10: 8
PAINLEVEL_OUTOF10: 4
PAINLEVEL_OUTOF10: 5
PAINLEVEL_OUTOF10: 8
PAINLEVEL_OUTOF10: 7
PAINLEVEL_OUTOF10: 8
PAINLEVEL_OUTOF10: 5
PAINLEVEL_OUTOF10: 8
PAINLEVEL_OUTOF10: 8
PAINLEVEL_OUTOF10: 10

## 2018-02-23 ASSESSMENT — ENCOUNTER SYMPTOMS
COUGH: 0
SHORTNESS OF BREATH: 0
DIARRHEA: 0
NAUSEA: 1
BACK PAIN: 0
ABDOMINAL PAIN: 1
VOMITING: 0

## 2018-02-23 ASSESSMENT — PAIN DESCRIPTION - PROGRESSION: CLINICAL_PROGRESSION: GRADUALLY IMPROVING

## 2018-02-23 ASSESSMENT — PAIN DESCRIPTION - ORIENTATION
ORIENTATION: UPPER;RIGHT
ORIENTATION: RIGHT;UPPER

## 2018-02-23 ASSESSMENT — PAIN DESCRIPTION - FREQUENCY: FREQUENCY: CONTINUOUS

## 2018-02-23 ASSESSMENT — PAIN DESCRIPTION - ONSET: ONSET: ON-GOING

## 2018-02-23 ASSESSMENT — PAIN DESCRIPTION - DESCRIPTORS: DESCRIPTORS: CONSTANT

## 2018-02-23 NOTE — ED PROVIDER NOTES
intraperitoneal air. Right-sided nephrolithiasis. LABS: All lab results were reviewed by myself, and all abnormals are listed below.   Labs Reviewed   CBC WITH AUTO DIFFERENTIAL - Abnormal; Notable for the following:        Result Value    Monocytes 12 (*)     All other components within normal limits   BASIC METABOLIC PANEL - Abnormal; Notable for the following:     Glucose 133 (*)     Potassium 3.1 (*)     All other components within normal limits   HEPATIC FUNCTION PANEL - Abnormal; Notable for the following:     Alkaline Phosphatase 136 (*)     Total Bilirubin 0.26 (*)     All other components within normal limits   MAGNESIUM   PROTIME-INR   LIPASE       EMERGENCY DEPARTMENT COURSE:   Vitals:    Vitals:    02/23/18 1000 02/23/18 1100 02/23/18 1224 02/23/18 1301   BP: 133/64 136/73 137/83 123/74   Pulse:  78 78 73   Resp:  14 16 15   Temp:   98.2 °F (36.8 °C) 98.1 °F (36.7 °C)   TempSrc:    Oral   SpO2: 92% 93% 96% 96%   Weight:    194 lb 0.1 oz (88 kg)   Height:    5' 5\" (1.651 m)       The patient was given the following medications while in the emergency department:  Orders Placed This Encounter   Medications    morphine injection 4 mg    0.9 % sodium chloride bolus    ondansetron (ZOFRAN) injection 4 mg    morphine injection 4 mg    sodium chloride flush 0.9 % injection 10 mL    sodium chloride flush 0.9 % injection 10 mL    acetaminophen (TYLENOL) tablet 650 mg    enoxaparin (LOVENOX) injection 40 mg    OR Linked Order Group     morphine (PF) injection 2 mg     morphine injection 4 mg    ondansetron (ZOFRAN) injection 4 mg    0.9 % sodium chloride infusion     -------------------------  CRITICAL CARE:   CONSULTS: IP CONSULT TO GENERAL SURGERY  PROCEDURES: Procedures     FINAL IMPRESSION      1. RUQ abdominal pain          DISPOSITION/PLAN   DISPOSITION Admitted 02/23/2018 12:03:12 PM      PATIENT REFERRED TO:  MD Dionne Armstrong Amadou Figueroa 1753:  Current Discharge Medication List            Christina Stroud MD  Attending Emergency Physician                      Christina Stroud MD  [facility]23/18 [facility] [address] [place] [address] MD Nadiya  02/23/18 8859

## 2018-02-24 ENCOUNTER — ANESTHESIA (OUTPATIENT)
Dept: OPERATING ROOM | Age: 58
DRG: 263 | End: 2018-02-24
Payer: COMMERCIAL

## 2018-02-24 ENCOUNTER — ANESTHESIA EVENT (OUTPATIENT)
Dept: OPERATING ROOM | Age: 58
DRG: 263 | End: 2018-02-24
Payer: COMMERCIAL

## 2018-02-24 VITALS
DIASTOLIC BLOOD PRESSURE: 77 MMHG | RESPIRATION RATE: 16 BRPM | BODY MASS INDEX: 32.32 KG/M2 | TEMPERATURE: 97.5 F | SYSTOLIC BLOOD PRESSURE: 158 MMHG | OXYGEN SATURATION: 96 % | WEIGHT: 194 LBS | HEART RATE: 83 BPM | HEIGHT: 65 IN

## 2018-02-24 VITALS
SYSTOLIC BLOOD PRESSURE: 165 MMHG | TEMPERATURE: 98.2 F | RESPIRATION RATE: 11 BRPM | OXYGEN SATURATION: 99 % | DIASTOLIC BLOOD PRESSURE: 82 MMHG

## 2018-02-24 LAB — POTASSIUM SERPL-SCNC: 4.5 MMOL/L (ref 3.7–5.3)

## 2018-02-24 PROCEDURE — 96376 TX/PRO/DX INJ SAME DRUG ADON: CPT

## 2018-02-24 PROCEDURE — 2580000003 HC RX 258: Performed by: ANESTHESIOLOGY

## 2018-02-24 PROCEDURE — 2580000003 HC RX 258

## 2018-02-24 PROCEDURE — 7100000000 HC PACU RECOVERY - FIRST 15 MIN: Performed by: SURGERY

## 2018-02-24 PROCEDURE — 6360000002 HC RX W HCPCS: Performed by: ANESTHESIOLOGY

## 2018-02-24 PROCEDURE — 3700000000 HC ANESTHESIA ATTENDED CARE: Performed by: SURGERY

## 2018-02-24 PROCEDURE — 36415 COLL VENOUS BLD VENIPUNCTURE: CPT

## 2018-02-24 PROCEDURE — 8E0W4CZ ROBOTIC ASSISTED PROCEDURE OF TRUNK REGION, PERCUTANEOUS ENDOSCOPIC APPROACH: ICD-10-PCS | Performed by: SURGERY

## 2018-02-24 PROCEDURE — G0378 HOSPITAL OBSERVATION PER HR: HCPCS

## 2018-02-24 PROCEDURE — 84132 ASSAY OF SERUM POTASSIUM: CPT

## 2018-02-24 PROCEDURE — S2900 ROBOTIC SURGICAL SYSTEM: HCPCS | Performed by: SURGERY

## 2018-02-24 PROCEDURE — 88304 TISSUE EXAM BY PATHOLOGIST: CPT

## 2018-02-24 PROCEDURE — 2500000003 HC RX 250 WO HCPCS: Performed by: SURGERY

## 2018-02-24 PROCEDURE — 6360000002 HC RX W HCPCS

## 2018-02-24 PROCEDURE — A6402 STERILE GAUZE <= 16 SQ IN: HCPCS | Performed by: SURGERY

## 2018-02-24 PROCEDURE — 3600000019 HC SURGERY ROBOT ADDTL 15MIN: Performed by: SURGERY

## 2018-02-24 PROCEDURE — 2720000010 HC SURG SUPPLY STERILE: Performed by: SURGERY

## 2018-02-24 PROCEDURE — 7100000001 HC PACU RECOVERY - ADDTL 15 MIN: Performed by: SURGERY

## 2018-02-24 PROCEDURE — 6360000002 HC RX W HCPCS: Performed by: SURGERY

## 2018-02-24 PROCEDURE — 0FT44ZZ RESECTION OF GALLBLADDER, PERCUTANEOUS ENDOSCOPIC APPROACH: ICD-10-PCS | Performed by: SURGERY

## 2018-02-24 PROCEDURE — 3600000009 HC SURGERY ROBOT BASE: Performed by: SURGERY

## 2018-02-24 PROCEDURE — 2580000003 HC RX 258: Performed by: SURGERY

## 2018-02-24 PROCEDURE — 3700000001 HC ADD 15 MINUTES (ANESTHESIA): Performed by: SURGERY

## 2018-02-24 PROCEDURE — 2500000003 HC RX 250 WO HCPCS: Performed by: ANESTHESIOLOGY

## 2018-02-24 RX ORDER — HYDRALAZINE HYDROCHLORIDE 20 MG/ML
5 INJECTION INTRAMUSCULAR; INTRAVENOUS EVERY 10 MIN PRN
Status: DISCONTINUED | OUTPATIENT
Start: 2018-02-24 | End: 2018-02-24 | Stop reason: HOSPADM

## 2018-02-24 RX ORDER — PROMETHAZINE HYDROCHLORIDE 25 MG/ML
6.25 INJECTION, SOLUTION INTRAMUSCULAR; INTRAVENOUS
Status: DISCONTINUED | OUTPATIENT
Start: 2018-02-24 | End: 2018-02-24

## 2018-02-24 RX ORDER — PROMETHAZINE HYDROCHLORIDE 25 MG/ML
6.25 INJECTION, SOLUTION INTRAMUSCULAR; INTRAVENOUS
Status: COMPLETED | OUTPATIENT
Start: 2018-02-24 | End: 2018-02-24

## 2018-02-24 RX ORDER — EPHEDRINE SULFATE 50 MG/ML
INJECTION, SOLUTION INTRAVENOUS PRN
Status: DISCONTINUED | OUTPATIENT
Start: 2018-02-24 | End: 2018-02-24 | Stop reason: SDUPTHER

## 2018-02-24 RX ORDER — LIDOCAINE HYDROCHLORIDE 10 MG/ML
INJECTION, SOLUTION EPIDURAL; INFILTRATION; INTRACAUDAL; PERINEURAL PRN
Status: DISCONTINUED | OUTPATIENT
Start: 2018-02-24 | End: 2018-02-24 | Stop reason: SDUPTHER

## 2018-02-24 RX ORDER — SODIUM CHLORIDE 9 MG/ML
INJECTION, SOLUTION INTRAVENOUS CONTINUOUS PRN
Status: DISCONTINUED | OUTPATIENT
Start: 2018-02-24 | End: 2018-02-24 | Stop reason: SDUPTHER

## 2018-02-24 RX ORDER — ROCURONIUM BROMIDE 10 MG/ML
INJECTION, SOLUTION INTRAVENOUS PRN
Status: DISCONTINUED | OUTPATIENT
Start: 2018-02-24 | End: 2018-02-24 | Stop reason: SDUPTHER

## 2018-02-24 RX ORDER — ONDANSETRON 2 MG/ML
INJECTION INTRAMUSCULAR; INTRAVENOUS PRN
Status: DISCONTINUED | OUTPATIENT
Start: 2018-02-24 | End: 2018-02-24 | Stop reason: SDUPTHER

## 2018-02-24 RX ORDER — MEPERIDINE HYDROCHLORIDE 50 MG/ML
12.5 INJECTION INTRAMUSCULAR; INTRAVENOUS; SUBCUTANEOUS EVERY 5 MIN PRN
Status: DISCONTINUED | OUTPATIENT
Start: 2018-02-24 | End: 2018-02-24 | Stop reason: HOSPADM

## 2018-02-24 RX ORDER — CEPHALEXIN 500 MG/1
CAPSULE ORAL
Qty: 21 CAPSULE | Refills: 0 | Status: SHIPPED | OUTPATIENT
Start: 2018-02-24 | End: 2018-04-16 | Stop reason: ALTCHOICE

## 2018-02-24 RX ORDER — DIPHENHYDRAMINE HYDROCHLORIDE 50 MG/ML
12.5 INJECTION INTRAMUSCULAR; INTRAVENOUS
Status: DISCONTINUED | OUTPATIENT
Start: 2018-02-24 | End: 2018-02-24 | Stop reason: HOSPADM

## 2018-02-24 RX ORDER — FENTANYL CITRATE 50 UG/ML
25 INJECTION, SOLUTION INTRAMUSCULAR; INTRAVENOUS EVERY 5 MIN PRN
Status: DISCONTINUED | OUTPATIENT
Start: 2018-02-24 | End: 2018-02-24 | Stop reason: HOSPADM

## 2018-02-24 RX ORDER — BUPIVACAINE HYDROCHLORIDE 2.5 MG/ML
INJECTION, SOLUTION EPIDURAL; INFILTRATION; INTRACAUDAL PRN
Status: DISCONTINUED | OUTPATIENT
Start: 2018-02-24 | End: 2018-02-24 | Stop reason: HOSPADM

## 2018-02-24 RX ORDER — ONDANSETRON 4 MG/1
TABLET, FILM COATED ORAL
Qty: 20 TABLET | Refills: 0 | Status: ON HOLD | OUTPATIENT
Start: 2018-02-24 | End: 2018-11-15

## 2018-02-24 RX ORDER — PROPOFOL 10 MG/ML
INJECTION, EMULSION INTRAVENOUS PRN
Status: DISCONTINUED | OUTPATIENT
Start: 2018-02-24 | End: 2018-02-24 | Stop reason: SDUPTHER

## 2018-02-24 RX ORDER — 0.9 % SODIUM CHLORIDE 0.9 %
500 INTRAVENOUS SOLUTION INTRAVENOUS
Status: DISCONTINUED | OUTPATIENT
Start: 2018-02-24 | End: 2018-02-24 | Stop reason: HOSPADM

## 2018-02-24 RX ORDER — MIDAZOLAM HYDROCHLORIDE 1 MG/ML
INJECTION INTRAMUSCULAR; INTRAVENOUS PRN
Status: DISCONTINUED | OUTPATIENT
Start: 2018-02-24 | End: 2018-02-24 | Stop reason: SDUPTHER

## 2018-02-24 RX ORDER — FENTANYL CITRATE 50 UG/ML
INJECTION, SOLUTION INTRAMUSCULAR; INTRAVENOUS PRN
Status: DISCONTINUED | OUTPATIENT
Start: 2018-02-24 | End: 2018-02-24 | Stop reason: SDUPTHER

## 2018-02-24 RX ORDER — OXYCODONE HYDROCHLORIDE AND ACETAMINOPHEN 5; 325 MG/1; MG/1
1 TABLET ORAL EVERY 6 HOURS PRN
Qty: 28 TABLET | Refills: 0 | Status: SHIPPED | OUTPATIENT
Start: 2018-02-24 | End: 2018-03-03

## 2018-02-24 RX ADMIN — PHENYLEPHRINE HYDROCHLORIDE 50 MCG: 10 INJECTION INTRAVENOUS at 08:51

## 2018-02-24 RX ADMIN — PHENYLEPHRINE HYDROCHLORIDE 100 MCG: 10 INJECTION INTRAVENOUS at 08:57

## 2018-02-24 RX ADMIN — EPHEDRINE SULFATE 10 MG: 50 INJECTION INTRAMUSCULAR; INTRAVENOUS; SUBCUTANEOUS at 09:07

## 2018-02-24 RX ADMIN — PHENYLEPHRINE HYDROCHLORIDE 100 MCG: 10 INJECTION INTRAVENOUS at 09:18

## 2018-02-24 RX ADMIN — SODIUM CHLORIDE: 9 INJECTION, SOLUTION INTRAVENOUS at 07:50

## 2018-02-24 RX ADMIN — SODIUM CHLORIDE: 9 INJECTION, SOLUTION INTRAVENOUS at 00:52

## 2018-02-24 RX ADMIN — FENTANYL CITRATE 100 MCG: 50 INJECTION, SOLUTION INTRAMUSCULAR; INTRAVENOUS at 04:23

## 2018-02-24 RX ADMIN — LIDOCAINE HYDROCHLORIDE 50 MG: 10 INJECTION, SOLUTION EPIDURAL; INFILTRATION; INTRACAUDAL; PERINEURAL at 08:02

## 2018-02-24 RX ADMIN — ONDANSETRON 4 MG: 2 INJECTION INTRAMUSCULAR; INTRAVENOUS at 12:03

## 2018-02-24 RX ADMIN — PHENYLEPHRINE HYDROCHLORIDE 100 MCG: 10 INJECTION INTRAVENOUS at 09:09

## 2018-02-24 RX ADMIN — MIDAZOLAM 2 MG: 1 INJECTION INTRAMUSCULAR; INTRAVENOUS at 08:00

## 2018-02-24 RX ADMIN — PROPOFOL 160 MG: 10 INJECTION, EMULSION INTRAVENOUS at 08:02

## 2018-02-24 RX ADMIN — ROCURONIUM BROMIDE 50 MG: 10 INJECTION INTRAVENOUS at 08:02

## 2018-02-24 RX ADMIN — PHENYLEPHRINE HYDROCHLORIDE 50 MCG: 10 INJECTION INTRAVENOUS at 08:54

## 2018-02-24 RX ADMIN — SUGAMMADEX 180 MG: 100 INJECTION, SOLUTION INTRAVENOUS at 09:51

## 2018-02-24 RX ADMIN — EPHEDRINE SULFATE 10 MG: 50 INJECTION INTRAMUSCULAR; INTRAVENOUS; SUBCUTANEOUS at 08:45

## 2018-02-24 RX ADMIN — ONDANSETRON 4 MG: 2 INJECTION INTRAMUSCULAR; INTRAVENOUS at 09:40

## 2018-02-24 RX ADMIN — EPHEDRINE SULFATE 10 MG: 50 INJECTION INTRAMUSCULAR; INTRAVENOUS; SUBCUTANEOUS at 08:44

## 2018-02-24 RX ADMIN — FENTANYL CITRATE 100 MCG: 50 INJECTION, SOLUTION INTRAMUSCULAR; INTRAVENOUS at 00:52

## 2018-02-24 RX ADMIN — PHENYLEPHRINE HYDROCHLORIDE 6 MCG: 10 INJECTION INTRAVENOUS at 09:45

## 2018-02-24 RX ADMIN — EPHEDRINE SULFATE 10 MG: 50 INJECTION INTRAMUSCULAR; INTRAVENOUS; SUBCUTANEOUS at 08:29

## 2018-02-24 RX ADMIN — FENTANYL CITRATE 100 MCG: 50 INJECTION, SOLUTION INTRAMUSCULAR; INTRAVENOUS at 08:04

## 2018-02-24 RX ADMIN — PROMETHAZINE HYDROCHLORIDE 6.25 MG: 25 INJECTION INTRAMUSCULAR; INTRAVENOUS at 10:22

## 2018-02-24 RX ADMIN — EPHEDRINE SULFATE 10 MG: 50 INJECTION INTRAMUSCULAR; INTRAVENOUS; SUBCUTANEOUS at 08:50

## 2018-02-24 ASSESSMENT — PULMONARY FUNCTION TESTS
PIF_VALUE: 23
PIF_VALUE: 22
PIF_VALUE: 22
PIF_VALUE: 21
PIF_VALUE: 23
PIF_VALUE: 22
PIF_VALUE: 23
PIF_VALUE: 22
PIF_VALUE: 21
PIF_VALUE: 23
PIF_VALUE: 23
PIF_VALUE: 19
PIF_VALUE: 16
PIF_VALUE: 22
PIF_VALUE: 18
PIF_VALUE: 23
PIF_VALUE: 22
PIF_VALUE: 17
PIF_VALUE: 23
PIF_VALUE: 20
PIF_VALUE: 22
PIF_VALUE: 23
PIF_VALUE: 6
PIF_VALUE: 23
PIF_VALUE: 22
PIF_VALUE: 19
PIF_VALUE: 23
PIF_VALUE: 18
PIF_VALUE: 21
PIF_VALUE: 23
PIF_VALUE: 20
PIF_VALUE: 21
PIF_VALUE: 1
PIF_VALUE: 13
PIF_VALUE: 1
PIF_VALUE: 22
PIF_VALUE: 19
PIF_VALUE: 1
PIF_VALUE: 23
PIF_VALUE: 22
PIF_VALUE: 20
PIF_VALUE: 23
PIF_VALUE: 23
PIF_VALUE: 2
PIF_VALUE: 20
PIF_VALUE: 22
PIF_VALUE: 20
PIF_VALUE: 23
PIF_VALUE: 23
PIF_VALUE: 19
PIF_VALUE: 23
PIF_VALUE: 23
PIF_VALUE: 20
PIF_VALUE: 23
PIF_VALUE: 20
PIF_VALUE: 23
PIF_VALUE: 16
PIF_VALUE: 21
PIF_VALUE: 3
PIF_VALUE: 24
PIF_VALUE: 3
PIF_VALUE: 17
PIF_VALUE: 18
PIF_VALUE: 25
PIF_VALUE: 19
PIF_VALUE: 23
PIF_VALUE: 19
PIF_VALUE: 23
PIF_VALUE: 15
PIF_VALUE: 23
PIF_VALUE: 18
PIF_VALUE: 17
PIF_VALUE: 22
PIF_VALUE: 1
PIF_VALUE: 20
PIF_VALUE: 21
PIF_VALUE: 20
PIF_VALUE: 23
PIF_VALUE: 23
PIF_VALUE: 24
PIF_VALUE: 23
PIF_VALUE: 22
PIF_VALUE: 22
PIF_VALUE: 18
PIF_VALUE: 22
PIF_VALUE: 22
PIF_VALUE: 19
PIF_VALUE: 20
PIF_VALUE: 22
PIF_VALUE: 23
PIF_VALUE: 1
PIF_VALUE: 23
PIF_VALUE: 22
PIF_VALUE: 27
PIF_VALUE: 22
PIF_VALUE: 15
PIF_VALUE: 19
PIF_VALUE: 23
PIF_VALUE: 22
PIF_VALUE: 23
PIF_VALUE: 19
PIF_VALUE: 21
PIF_VALUE: 23
PIF_VALUE: 21
PIF_VALUE: 20

## 2018-02-24 ASSESSMENT — PAIN SCALES - GENERAL
PAINLEVEL_OUTOF10: 10
PAINLEVEL_OUTOF10: 9
PAINLEVEL_OUTOF10: 0
PAINLEVEL_OUTOF10: 4
PAINLEVEL_OUTOF10: 2

## 2018-02-24 ASSESSMENT — PAIN DESCRIPTION - PAIN TYPE: TYPE: SURGICAL PAIN

## 2018-02-24 ASSESSMENT — PAIN DESCRIPTION - LOCATION: LOCATION: ABDOMEN

## 2018-02-24 NOTE — DISCHARGE INSTR - DIET

## 2018-02-24 NOTE — H&P
General Surgery Consult      Pt Name: Mira Price  MRN: [id number]  YOB: 1960  Date of evaluation: 2/24/2018  Primary Care Physician: Theresa Orourke MD   Patient evaluated at the request of  Dr. Niall Shaffer  Reason for evaluation: Abdominal pain    SUBJECTIVE:   History of Chief Complaint:    Mira Price is a 61 y.o. female who presents with Abdominal pain the right upper quadrant nausea bloating sensation pain radiating to the back. Patient was seen few days ago at [facility] was diagnosed with chronic cholecystitis. She came into the emergency room today with increasing pain. Patient has had previous lipoma removed from the bowel with a supraumbilical incision. No other major abdominal surgeries. ER workup was reviewed. Radiology workup was reviewed. . Symptom onset has been gradual for a time period of few week(s). Severity is described as moderate. Course of her symptoms over time is acute. Past Medical History   has a past medical history of Arthritis; Asthma; and Hypertension. Past Surgical History   has a past surgical history that includes lipoma resection (Right) and Appendectomy. Medications  Prior to Admission medications    Medication Sig Start Date End Date Taking? Authorizing Provider   cephALEXin (KEFLEX) 500 MG capsule 500 mgTake three times daily 2/24/18  Yes Radha De La Cruz MD   ondansetron (ZOFRAN) 4 MG tablet Take every six hours as needed 2/24/18  Yes Radha De La Cruz MD   oxyCODONE-acetaminophen (PERCOCET) 5-325 MG per tablet Take 1 tablet by mouth every 6 hours as needed for Pain for up to 7 days . Take lowest dose possible to manage pain. 2/24/18 3/3/18 Yes Radha De La Cruz MD   polyethylene glycol [facility]) packet Take 17 g by mouth daily 2/19/18 3/21/18 Yes Neftali Parham MD   traMADol (ULTRAM) 50 MG tablet Take 1 tablet by mouth every 6 hours as needed for Pain for up to 5 days . Take lowest dose possible to manage pain.  2/19/18 2/24/18 Yes Miguel STARR

## 2018-02-24 NOTE — PLAN OF CARE
Problem: Pain:  Goal: Pain level will decrease  Pain level will decrease   Outcome: Ongoing  Adequate pain control achieved this shift. See MAR.     Problem: Falls - Risk of:  Goal: Will remain free from falls  Will remain free from falls   Outcome: Ongoing  PT remains injury and fall free  PT uses call light correctly  Call light within reach  Room door open  PT has steady gait

## 2018-02-24 NOTE — DISCHARGE INSTR - ACTIVITY
No pushing, pulling, or heavy lifting more than 5lbs. My shower and pat incisions dry. No tub bathing or hot tubs.

## 2018-02-24 NOTE — ANESTHESIA PRE PROCEDURE
Pedro Marrufo MD           Allergies: Allergies   Allergen Reactions    Banana Hives    Hydrocodone-Acetaminophen      Other reaction(s): Nausea And Vomiting       Problem List:    Patient Active Problem List   Diagnosis Code    Contusion, elbow S50.00XA    Chest pain, atypical R07.89    Tobacco abuse disorder Z72.0    Schatzki's ring K22.2    HTN (hypertension) I10    Mild intermittent asthma without complication P12.58    RUQ abdominal pain R10.11    Cholecystitis K81.9       Past Medical History:        Diagnosis Date    Arthritis     Asthma     Hypertension        Past Surgical History:        Procedure Laterality Date    APPENDECTOMY      LIPOMA RESECTION Right        Social History:    Social History   Substance Use Topics    Smoking status: Current Every Day Smoker     Packs/day: 1.00     Years: 40.00     Types: Cigarettes    Smokeless tobacco: Never Used      Comment: declined nicotine patch    Alcohol use 0.6 oz/week     1 Cans of beer per week      Comment: twice a year                                Ready to quit: Not Answered  Counseling given: Not Answered      Vital Signs (Current):   Vitals:    02/23/18 1224 02/23/18 1301 02/23/18 1744 02/24/18 0652   BP: 137/83 123/74 124/74 138/77   Pulse: 78 73 73 72   Resp: 16 15 16 16   Temp: 98.2 °F (36.8 °C) 98.1 °F (36.7 °C) 98.1 °F (36.7 °C) 98.1 °F (36.7 °C)   TempSrc:  Oral Oral Oral   SpO2: 96% 96% 97% 94%   Weight:  194 lb 0.1 oz (88 kg)     Height:  5' 5\" (1.651 m)                                                BP Readings from Last 3 Encounters:   02/24/18 138/77   02/19/18 (!) 166/79   02/15/18 (!) 160/88       NPO Status:                                                                                 BMI:   Wt Readings from Last 3 Encounters:   02/23/18 194 lb 0.1 oz (88 kg)   02/19/18 176 lb (79.8 kg)   02/14/18 176 lb 5.9 oz (80 kg)     Body mass index is 32.28 kg/m².     CBC:   Lab Results   Component Value Date    WBC 7.4 02/23/2018    RBC 4.58 02/23/2018    HGB 15.0 02/23/2018    HCT 44.4 02/23/2018    MCV 96.9 02/23/2018    RDW 14.5 02/23/2018     02/23/2018       CMP:   Lab Results   Component Value Date     02/23/2018    K 3.1 02/23/2018     02/23/2018    CO2 25 02/23/2018    BUN 10 02/23/2018    CREATININE 0.84 02/23/2018    GFRAA >60 02/23/2018    GFRAA >60 01/17/2010    AGRATIO 1.4 09/09/2015    LABGLOM >60 02/23/2018    GLUCOSE 133 02/23/2018    PROT 6.6 02/23/2018    PROT 6.3 01/17/2010    CALCIUM 9.0 02/23/2018    BILITOT 0.26 02/23/2018    ALKPHOS 136 02/23/2018    AST 16 02/23/2018    ALT 15 02/23/2018       POC Tests: No results for input(s): POCGLU, POCNA, POCK, POCCL, POCBUN, POCHEMO, POCHCT in the last 72 hours. Coags:   Lab Results   Component Value Date    PROTIME 10.4 02/23/2018    PROTIME 11.0 01/17/2010    INR 1.0 02/23/2018    APTT 30.9 01/17/2010       HCG (If Applicable): No results found for: PREGTESTUR, PREGSERUM, HCG, HCGQUANT     ABGs: No results found for: PHART, PO2ART, WFL2NQD, TTJ3VWF, BEART, E0OASGZX     Type & Screen (If Applicable):  No results found for: LABABO, [address]    Anesthesia Evaluation  Patient summary reviewed and Nursing notes reviewed no history of anesthetic complications:   Airway: Mallampati: II  TM distance: >3 FB   Neck ROM: full  Mouth opening: > = 3 FB Dental:    (+) partials  Comment: Missing teeth    Pulmonary:normal exam    (+) asthma:                            Cardiovascular:  Exercise tolerance: poor (<4 METS),   (+) hypertension:, KING:, hyperlipidemia                  Neuro/Psych:   Negative Neuro/Psych ROS              GI/Hepatic/Renal:   (+) GERD:,           Endo/Other: Negative Endo/Other ROS                    Abdominal:           Vascular: negative vascular ROS. Anesthesia Plan      general     ASA 3       Induction: intravenous.     MIPS: Postoperative opioids intended and Prophylactic antiemetics

## 2018-02-27 LAB — SURGICAL PATHOLOGY REPORT: NORMAL

## 2018-03-06 ENCOUNTER — APPOINTMENT (OUTPATIENT)
Dept: GENERAL RADIOLOGY | Age: 58
End: 2018-03-06
Payer: COMMERCIAL

## 2018-03-06 ENCOUNTER — HOSPITAL ENCOUNTER (OUTPATIENT)
Age: 58
Setting detail: OBSERVATION
Discharge: HOME | End: 2018-03-06
Attending: EMERGENCY MEDICINE | Admitting: INTERNAL MEDICINE
Payer: COMMERCIAL

## 2018-03-06 ENCOUNTER — APPOINTMENT (OUTPATIENT)
Dept: CT IMAGING | Age: 58
End: 2018-03-06
Payer: COMMERCIAL

## 2018-03-06 ENCOUNTER — APPOINTMENT (OUTPATIENT)
Dept: NUCLEAR MEDICINE | Age: 58
End: 2018-03-06
Payer: COMMERCIAL

## 2018-03-06 VITALS
TEMPERATURE: 96.5 F | OXYGEN SATURATION: 97 % | SYSTOLIC BLOOD PRESSURE: 125 MMHG | HEART RATE: 64 BPM | WEIGHT: 170 LBS | HEIGHT: 65 IN | RESPIRATION RATE: 16 BRPM | BODY MASS INDEX: 28.32 KG/M2 | DIASTOLIC BLOOD PRESSURE: 70 MMHG

## 2018-03-06 DIAGNOSIS — E87.6 HYPOKALEMIA: ICD-10-CM

## 2018-03-06 DIAGNOSIS — R07.9 CHEST PAIN, UNSPECIFIED TYPE: Primary | ICD-10-CM

## 2018-03-06 DIAGNOSIS — R79.89 ELEVATED LFTS: ICD-10-CM

## 2018-03-06 DIAGNOSIS — F17.200 SMOKING ADDICTION: ICD-10-CM

## 2018-03-06 PROBLEM — E83.51 HYPOCALCEMIA: Status: ACTIVE | Noted: 2018-03-06

## 2018-03-06 PROBLEM — Z90.49 S/P CHOLECYSTECTOMY: Status: ACTIVE | Noted: 2018-03-06

## 2018-03-06 LAB
ABSOLUTE EOS #: 0.1 K/UL (ref 0–0.4)
ABSOLUTE IMMATURE GRANULOCYTE: ABNORMAL K/UL (ref 0–0.3)
ABSOLUTE LYMPH #: 2.5 K/UL (ref 1–4.8)
ABSOLUTE MONO #: 0.7 K/UL (ref 0.1–1.3)
ALBUMIN SERPL-MCNC: 3.1 G/DL (ref 3.5–5.2)
ALBUMIN/GLOBULIN RATIO: ABNORMAL (ref 1–2.5)
ALP BLD-CCNC: 137 U/L (ref 35–104)
ALT SERPL-CCNC: 21 U/L (ref 5–33)
ANION GAP SERPL CALCULATED.3IONS-SCNC: 11 MMOL/L (ref 9–17)
AST SERPL-CCNC: 34 U/L
BASOPHILS # BLD: 0 % (ref 0–2)
BASOPHILS ABSOLUTE: 0 K/UL (ref 0–0.2)
BILIRUB SERPL-MCNC: 0.18 MG/DL (ref 0.3–1.2)
BNP INTERPRETATION: NORMAL
BUN BLDV-MCNC: 8 MG/DL (ref 6–20)
BUN/CREAT BLD: ABNORMAL (ref 9–20)
CALCIUM IONIZED: 1.23 MMOL/L (ref 1.13–1.33)
CALCIUM SERPL-MCNC: 8 MG/DL (ref 8.6–10.4)
CHLORIDE BLD-SCNC: 101 MMOL/L (ref 98–107)
CHOLESTEROL/HDL RATIO: 4.4
CHOLESTEROL: 131 MG/DL
CO2: 25 MMOL/L (ref 20–31)
CREAT SERPL-MCNC: 0.69 MG/DL (ref 0.5–0.9)
D-DIMER QUANTITATIVE: 0.79 MG/L FEU
DIFFERENTIAL TYPE: ABNORMAL
EOSINOPHILS RELATIVE PERCENT: 1 % (ref 0–4)
ESTIMATED AVERAGE GLUCOSE: 120 MG/DL
ETHANOL PERCENT: 0.02 %
ETHANOL: 20 MG/DL
GFR AFRICAN AMERICAN: >60 ML/MIN
GFR NON-AFRICAN AMERICAN: >60 ML/MIN
GFR SERPL CREATININE-BSD FRML MDRD: ABNORMAL ML/MIN/{1.73_M2}
GFR SERPL CREATININE-BSD FRML MDRD: ABNORMAL ML/MIN/{1.73_M2}
GLUCOSE BLD-MCNC: 116 MG/DL (ref 70–99)
HBA1C MFR BLD: 5.8 % (ref 4–6)
HCT VFR BLD CALC: 37.4 % (ref 36–46)
HDLC SERPL-MCNC: 30 MG/DL
HEMOGLOBIN: 12.8 G/DL (ref 12–16)
IMMATURE GRANULOCYTES: ABNORMAL %
INR BLD: 1
LDL CHOLESTEROL: 55 MG/DL (ref 0–130)
LV EF: 38 %
LV EF: 45 %
LVEF MODALITY: NORMAL
LVEF MODALITY: NORMAL
LYMPHOCYTES # BLD: 28 % (ref 24–44)
MAGNESIUM: 2.1 MG/DL (ref 1.6–2.6)
MCH RBC QN AUTO: 33.1 PG (ref 26–34)
MCHC RBC AUTO-ENTMCNC: 34.2 G/DL (ref 31–37)
MCV RBC AUTO: 96.9 FL (ref 80–100)
MONOCYTES # BLD: 7 % (ref 1–7)
NRBC AUTOMATED: ABNORMAL PER 100 WBC
PDW BLD-RTO: 14.3 % (ref 11.5–14.9)
PLATELET # BLD: 305 K/UL (ref 150–450)
PLATELET ESTIMATE: ABNORMAL
PMV BLD AUTO: 8.8 FL (ref 6–12)
POTASSIUM SERPL-SCNC: 3.1 MMOL/L (ref 3.7–5.3)
PRO-BNP: 61 PG/ML
PROTHROMBIN TIME: 10.6 SEC (ref 9.7–12)
RBC # BLD: 3.86 M/UL (ref 4–5.2)
RBC # BLD: ABNORMAL 10*6/UL
SEG NEUTROPHILS: 64 % (ref 36–66)
SEGMENTED NEUTROPHILS ABSOLUTE COUNT: 5.6 K/UL (ref 1.3–9.1)
SODIUM BLD-SCNC: 137 MMOL/L (ref 135–144)
TOTAL PROTEIN: 5.6 G/DL (ref 6.4–8.3)
TRIGL SERPL-MCNC: 231 MG/DL
TROPONIN INTERP: NORMAL
TROPONIN INTERP: NORMAL
TROPONIN T: <0.03 NG/ML
TROPONIN T: <0.03 NG/ML
TSH SERPL DL<=0.05 MIU/L-ACNC: 0.89 MIU/L (ref 0.3–5)
VLDLC SERPL CALC-MCNC: ABNORMAL MG/DL (ref 1–30)
WBC # BLD: 8.9 K/UL (ref 3.5–11)
WBC # BLD: ABNORMAL 10*3/UL

## 2018-03-06 PROCEDURE — 2580000003 HC RX 258: Performed by: NURSE PRACTITIONER

## 2018-03-06 PROCEDURE — 85025 COMPLETE CBC W/AUTO DIFF WBC: CPT

## 2018-03-06 PROCEDURE — A9500 TC99M SESTAMIBI: HCPCS | Performed by: NURSE PRACTITIONER

## 2018-03-06 PROCEDURE — 99285 EMERGENCY DEPT VISIT HI MDM: CPT

## 2018-03-06 PROCEDURE — 85610 PROTHROMBIN TIME: CPT

## 2018-03-06 PROCEDURE — 83735 ASSAY OF MAGNESIUM: CPT

## 2018-03-06 PROCEDURE — G0480 DRUG TEST DEF 1-7 CLASSES: HCPCS

## 2018-03-06 PROCEDURE — G0378 HOSPITAL OBSERVATION PER HR: HCPCS

## 2018-03-06 PROCEDURE — 99223 1ST HOSP IP/OBS HIGH 75: CPT | Performed by: INTERNAL MEDICINE

## 2018-03-06 PROCEDURE — 85379 FIBRIN DEGRADATION QUANT: CPT

## 2018-03-06 PROCEDURE — 6370000000 HC RX 637 (ALT 250 FOR IP): Performed by: INTERNAL MEDICINE

## 2018-03-06 PROCEDURE — 36415 COLL VENOUS BLD VENIPUNCTURE: CPT

## 2018-03-06 PROCEDURE — 2580000003 HC RX 258: Performed by: INTERNAL MEDICINE

## 2018-03-06 PROCEDURE — 78452 HT MUSCLE IMAGE SPECT MULT: CPT

## 2018-03-06 PROCEDURE — 83880 ASSAY OF NATRIURETIC PEPTIDE: CPT

## 2018-03-06 PROCEDURE — 3430000000 HC RX DIAGNOSTIC RADIOPHARMACEUTICAL: Performed by: NURSE PRACTITIONER

## 2018-03-06 PROCEDURE — 93017 CV STRESS TEST TRACING ONLY: CPT

## 2018-03-06 PROCEDURE — 84484 ASSAY OF TROPONIN QUANT: CPT

## 2018-03-06 PROCEDURE — 6370000000 HC RX 637 (ALT 250 FOR IP): Performed by: NURSE PRACTITIONER

## 2018-03-06 PROCEDURE — 80053 COMPREHEN METABOLIC PANEL: CPT

## 2018-03-06 PROCEDURE — 93306 TTE W/DOPPLER COMPLETE: CPT

## 2018-03-06 PROCEDURE — 71046 X-RAY EXAM CHEST 2 VIEWS: CPT

## 2018-03-06 PROCEDURE — 94760 N-INVAS EAR/PLS OXIMETRY 1: CPT

## 2018-03-06 PROCEDURE — 84443 ASSAY THYROID STIM HORMONE: CPT

## 2018-03-06 PROCEDURE — 6360000002 HC RX W HCPCS: Performed by: INTERNAL MEDICINE

## 2018-03-06 PROCEDURE — 93005 ELECTROCARDIOGRAM TRACING: CPT

## 2018-03-06 PROCEDURE — 6360000002 HC RX W HCPCS: Performed by: NURSE PRACTITIONER

## 2018-03-06 PROCEDURE — 80061 LIPID PANEL: CPT

## 2018-03-06 PROCEDURE — 96372 THER/PROPH/DIAG INJ SC/IM: CPT

## 2018-03-06 PROCEDURE — 6370000000 HC RX 637 (ALT 250 FOR IP): Performed by: EMERGENCY MEDICINE

## 2018-03-06 PROCEDURE — 82330 ASSAY OF CALCIUM: CPT

## 2018-03-06 PROCEDURE — 71260 CT THORAX DX C+: CPT

## 2018-03-06 PROCEDURE — 6360000004 HC RX CONTRAST MEDICATION: Performed by: INTERNAL MEDICINE

## 2018-03-06 PROCEDURE — 83036 HEMOGLOBIN GLYCOSYLATED A1C: CPT

## 2018-03-06 RX ORDER — DEXTROSE, SODIUM CHLORIDE, AND POTASSIUM CHLORIDE 5; .45; .15 G/100ML; G/100ML; G/100ML
INJECTION INTRAVENOUS CONTINUOUS
Status: DISCONTINUED | OUTPATIENT
Start: 2018-03-06 | End: 2018-03-06

## 2018-03-06 RX ORDER — POTASSIUM CHLORIDE 20 MEQ/1
40 TABLET, EXTENDED RELEASE ORAL PRN
Status: DISCONTINUED | OUTPATIENT
Start: 2018-03-06 | End: 2018-03-06 | Stop reason: HOSPADM

## 2018-03-06 RX ORDER — NITROGLYCERIN 0.4 MG/1
0.4 TABLET SUBLINGUAL EVERY 5 MIN PRN
Status: DISCONTINUED | OUTPATIENT
Start: 2018-03-06 | End: 2018-03-06 | Stop reason: HOSPADM

## 2018-03-06 RX ORDER — VENLAFAXINE HYDROCHLORIDE 225 MG/1
225 TABLET, EXTENDED RELEASE ORAL
Status: DISCONTINUED | OUTPATIENT
Start: 2018-03-06 | End: 2018-03-06 | Stop reason: SDUPTHER

## 2018-03-06 RX ORDER — 0.9 % SODIUM CHLORIDE 0.9 %
250 INTRAVENOUS SOLUTION INTRAVENOUS ONCE
Status: DISCONTINUED | OUTPATIENT
Start: 2018-03-06 | End: 2018-03-06 | Stop reason: HOSPADM

## 2018-03-06 RX ORDER — NICOTINE 21 MG/24HR
1 PATCH, TRANSDERMAL 24 HOURS TRANSDERMAL DAILY
Status: DISCONTINUED | OUTPATIENT
Start: 2018-03-06 | End: 2018-03-06 | Stop reason: HOSPADM

## 2018-03-06 RX ORDER — METOPROLOL TARTRATE 5 MG/5ML
2.5 INJECTION INTRAVENOUS PRN
Status: DISCONTINUED | OUTPATIENT
Start: 2018-03-06 | End: 2018-03-06 | Stop reason: HOSPADM

## 2018-03-06 RX ORDER — VERAPAMIL HYDROCHLORIDE 120 MG/1
120 TABLET, FILM COATED ORAL DAILY
Status: DISCONTINUED | OUTPATIENT
Start: 2018-03-06 | End: 2018-03-06 | Stop reason: CLARIF

## 2018-03-06 RX ORDER — POTASSIUM CHLORIDE 20 MEQ/1
40 TABLET, EXTENDED RELEASE ORAL ONCE
Status: COMPLETED | OUTPATIENT
Start: 2018-03-06 | End: 2018-03-06

## 2018-03-06 RX ORDER — SODIUM CHLORIDE 0.9 % (FLUSH) 0.9 %
10 SYRINGE (ML) INJECTION PRN
Status: DISCONTINUED | OUTPATIENT
Start: 2018-03-06 | End: 2018-03-06 | Stop reason: HOSPADM

## 2018-03-06 RX ORDER — POTASSIUM CHLORIDE 7.45 MG/ML
10 INJECTION INTRAVENOUS PRN
Status: DISCONTINUED | OUTPATIENT
Start: 2018-03-06 | End: 2018-03-06 | Stop reason: HOSPADM

## 2018-03-06 RX ORDER — AMINOPHYLLINE DIHYDRATE 25 MG/ML
100 INJECTION, SOLUTION INTRAVENOUS
Status: DISCONTINUED | OUTPATIENT
Start: 2018-03-06 | End: 2018-03-06 | Stop reason: HOSPADM

## 2018-03-06 RX ORDER — ALPRAZOLAM 0.5 MG/1
0.5 TABLET ORAL NIGHTLY PRN
Status: DISCONTINUED | OUTPATIENT
Start: 2018-03-06 | End: 2018-03-06 | Stop reason: HOSPADM

## 2018-03-06 RX ORDER — CITALOPRAM 40 MG/1
40 TABLET ORAL DAILY
Status: DISCONTINUED | OUTPATIENT
Start: 2018-03-06 | End: 2018-03-06 | Stop reason: HOSPADM

## 2018-03-06 RX ORDER — ATORVASTATIN CALCIUM 10 MG/1
10 TABLET, FILM COATED ORAL NIGHTLY
Status: DISCONTINUED | OUTPATIENT
Start: 2018-03-06 | End: 2018-03-06 | Stop reason: HOSPADM

## 2018-03-06 RX ORDER — POLYETHYLENE GLYCOL 3350 17 G/17G
17 POWDER, FOR SOLUTION ORAL DAILY
Status: DISCONTINUED | OUTPATIENT
Start: 2018-03-06 | End: 2018-03-06 | Stop reason: HOSPADM

## 2018-03-06 RX ORDER — NITROGLYCERIN 0.4 MG/1
TABLET SUBLINGUAL
Qty: 25 TABLET | Refills: 3 | Status: SHIPPED | OUTPATIENT
Start: 2018-03-06

## 2018-03-06 RX ORDER — 0.9 % SODIUM CHLORIDE 0.9 %
100 INTRAVENOUS SOLUTION INTRAVENOUS ONCE
Status: COMPLETED | OUTPATIENT
Start: 2018-03-06 | End: 2018-03-06

## 2018-03-06 RX ORDER — POTASSIUM CHLORIDE 20MEQ/15ML
40 LIQUID (ML) ORAL PRN
Status: DISCONTINUED | OUTPATIENT
Start: 2018-03-06 | End: 2018-03-06 | Stop reason: HOSPADM

## 2018-03-06 RX ORDER — ONDANSETRON 2 MG/ML
4 INJECTION INTRAMUSCULAR; INTRAVENOUS EVERY 6 HOURS PRN
Status: DISCONTINUED | OUTPATIENT
Start: 2018-03-06 | End: 2018-03-06 | Stop reason: HOSPADM

## 2018-03-06 RX ORDER — MAGNESIUM SULFATE 1 G/100ML
1 INJECTION INTRAVENOUS PRN
Status: DISCONTINUED | OUTPATIENT
Start: 2018-03-06 | End: 2018-03-06 | Stop reason: HOSPADM

## 2018-03-06 RX ORDER — PANTOPRAZOLE SODIUM 40 MG/1
40 TABLET, DELAYED RELEASE ORAL DAILY
Status: DISCONTINUED | OUTPATIENT
Start: 2018-03-06 | End: 2018-03-06 | Stop reason: HOSPADM

## 2018-03-06 RX ORDER — ASPIRIN 81 MG/1
81 TABLET ORAL DAILY
Status: DISCONTINUED | OUTPATIENT
Start: 2018-03-06 | End: 2018-03-06 | Stop reason: HOSPADM

## 2018-03-06 RX ORDER — SODIUM CHLORIDE 0.9 % (FLUSH) 0.9 %
10 SYRINGE (ML) INJECTION EVERY 12 HOURS SCHEDULED
Status: DISCONTINUED | OUTPATIENT
Start: 2018-03-06 | End: 2018-03-06 | Stop reason: HOSPADM

## 2018-03-06 RX ORDER — ERGOCALCIFEROL 1.25 MG/1
50000 CAPSULE ORAL WEEKLY
Status: DISCONTINUED | OUTPATIENT
Start: 2018-03-11 | End: 2018-03-06 | Stop reason: HOSPADM

## 2018-03-06 RX ADMIN — CITALOPRAM HYDROBROMIDE 40 MG: 40 TABLET ORAL at 12:33

## 2018-03-06 RX ADMIN — TETRAKIS(2-METHOXYISOBUTYLISOCYANIDE)COPPER(I) TETRAFLUOROBORATE 34.9 MILLICURIE: 1 INJECTION, POWDER, LYOPHILIZED, FOR SOLUTION INTRAVENOUS at 10:25

## 2018-03-06 RX ADMIN — POTASSIUM CHLORIDE 40 MEQ: 20 TABLET, EXTENDED RELEASE ORAL at 06:27

## 2018-03-06 RX ADMIN — Medication 10 ML: at 08:05

## 2018-03-06 RX ADMIN — PANTOPRAZOLE SODIUM 40 MG: 40 TABLET, DELAYED RELEASE ORAL at 12:33

## 2018-03-06 RX ADMIN — Medication 10 ML: at 10:23

## 2018-03-06 RX ADMIN — ASPIRIN 81 MG: 81 TABLET, COATED ORAL at 12:33

## 2018-03-06 RX ADMIN — REGADENOSON 0.4 MG: 0.08 INJECTION, SOLUTION INTRAVENOUS at 10:23

## 2018-03-06 RX ADMIN — Medication 10 ML: at 13:20

## 2018-03-06 RX ADMIN — VENLAFAXINE HYDROCHLORIDE 225 MG: 150 CAPSULE, EXTENDED RELEASE ORAL at 13:00

## 2018-03-06 RX ADMIN — ENOXAPARIN SODIUM 40 MG: 40 INJECTION SUBCUTANEOUS at 12:33

## 2018-03-06 RX ADMIN — Medication 10 ML: at 13:01

## 2018-03-06 RX ADMIN — TETRAKIS(2-METHOXYISOBUTYLISOCYANIDE)COPPER(I) TETRAFLUOROBORATE 11.7 MILLICURIE: 1 INJECTION, POWDER, LYOPHILIZED, FOR SOLUTION INTRAVENOUS at 08:05

## 2018-03-06 RX ADMIN — SODIUM CHLORIDE 100 ML: 9 INJECTION, SOLUTION INTRAVENOUS at 13:21

## 2018-03-06 RX ADMIN — IOPAMIDOL 100 ML: 755 INJECTION, SOLUTION INTRAVENOUS at 13:20

## 2018-03-06 ASSESSMENT — ENCOUNTER SYMPTOMS
NAUSEA: 1
WHEEZING: 0
VOMITING: 0
BACK PAIN: 0
ORTHOPNEA: 0
ABDOMINAL PAIN: 0
DOUBLE VISION: 0
DIARRHEA: 0
BLURRED VISION: 0
SHORTNESS OF BREATH: 1
COUGH: 0
CONSTIPATION: 0
SPUTUM PRODUCTION: 0
EYE PAIN: 0
SORE THROAT: 0

## 2018-03-06 ASSESSMENT — PAIN SCALES - GENERAL
PAINLEVEL_OUTOF10: 4
PAINLEVEL_OUTOF10: 0

## 2018-03-06 NOTE — H&P
Nonspecific     Clinical Impression: Nonspecific EKG    Labs:  CBC:   Recent Labs      03/06/18 0516   WBC  8.9   HGB  12.8   PLT  305     BMP:    Recent Labs      03/06/18 0516   NA  137   K  3.1*   CL  101   CO2  25   BUN  8   CREATININE  0.69   GLUCOSE  116*     S. Calcium:  Recent Labs      03/06/18 0516   CALCIUM  8.0*     S. Ionized Calcium:No results for input(s): IONCA in the last 72 hours. S. Magnesium:  Recent Labs      03/06/18 0516   MG  2.1     S. Phosphorus:No results for input(s): PHOS in the last 72 hours. S. Glucose:No results for input(s): POCGLU in the last 72 hours. Glycosylated hemoglobin A1C:   Lab Results   Component Value Date    LABA1C 5.5 09/09/2015     Hepatic:   Recent Labs      03/06/18 0516   AST  34*   ALT  21     CARDIAC ENZY:   Recent Labs      03/06/18 0516   TROPONINT  <0.03     INR: No results for input(s): INR in the last 72 hours. BNP: No results for input(s): BNP in the last 72 hours. Invalid input(s):  PROBNP  ABGs: No results for input(s): PH, PCO2, PO2, HCO3, O2SAT in the last 72 hours. Lipids: No results for input(s): CHOL, TRIG, HDL, LDLCALC in the last 72 hours. Invalid input(s): LDL  Pancreatic functions:No results for input(s): LIPASE, AMYLASE in the last 72 hours. S. Lactic Acid: No results for input(s): LACTA in the last 72 hours. Thyroid functions:   Lab Results   Component Value Date    TSH 3.16 09/09/2015      U/A:No results for input(s): NITRITE, COLORU, WBCUA, RBCUA, MUCUS, BACTERIA, CLARITYU, SPECGRAV, LEUKOCYTESUR, BLOODU, GLUCOSEU, AMORPHOUS in the last 72 hours. Invalid input(s): Yi Oyster    Imaging/Diagonstics:     Ct Abdomen Pelvis Wo Contrast    Result Date: 2/14/2018  EXAMINATION: CT OF THE ABDOMEN AND PELVIS WITHOUT CONTRAST 2/14/2018 5:46 am TECHNIQUE: CT of the abdomen and pelvis was performed without the administration of intravenous contrast. Multiplanar reformatted images are provided for review.  Dose modulation, right kidney demonstrates no gross right-sided hydronephrosis. PANCREAS:  Visualized portions of the pancreas are unremarkable. Pancreatic body and pancreatic tail not visualized. OTHER: No evidence of right upper quadrant ascites. Overall, grossly unremarkable right upper quadrant abdominal ultrasound. ASSESSMENT  and  PLAN     Principal Problem:    Chest pain, atypical  Active Problems:    Tobacco abuse disorder    Hypokalemia    Hypocalcemia    S/P cholecystectomy    Chest pain  Resolved Problems:    * No resolved hospital problems. *    Plan:    Atypical Chest Pain  -Troponin negative X 1; 2nd troponin due at 0715  -EKG - NSR  -Stress Test this am  --NPO   -Check magnesium, BNP, TSH, Lipid panel, & HgbA1c in am  -Check 2D echocardiogram  -Pain/nausea control  -EKG PRN chest pain  -Last stress test 9/2015 - WNL    Hypokalemia  -40 meq K+ administered in ED  -K+ replacement protocol  -recheck BMP in am    Hypocalcemia  -Calcium level 8.0 in ED  -Check Ionized Calcium    Tobacco use   -smoking cessation education  -nicotine patch     S/P cholecystectomy  -Surgery completed 2/24  -Surgical dressings remain dry and intact      Consultations:     IP CONSULT TO PRIMARY CARE PROVIDER      Farida Chi [facility]   3/6/2018  7:09 AM    Stacy Steen [address] [place] [address].    Phone [phone]

## 2018-03-06 NOTE — ED PROVIDER NOTES
resection (Right); Appendectomy; and pr lap,cholecystectomy (N/A, 2/24/2018). Social History:  reports that she has been smoking Cigarettes. She has a 40.00 pack-year smoking history. She has never used smokeless tobacco. She reports that she drinks about 0.6 oz of alcohol per week . She reports that she does not use drugs. Family History: Noncontributory at this time  Psychiatric History: Noncontributory at this time    Allergies:is allergic to banana and hydrocodone-acetaminophen. PHYSICAL EXAM     INITIAL VITALS: /60   Pulse 72   Temp 97.6 °F (36.4 °C) (Oral)   Resp 14   Ht 5' 5\" (1.651 m)   Wt 170 lb (77.1 kg)   SpO2 96%   BMI 28.29 kg/m²     Physical Exam   Constitutional: She is oriented to person, place, and time. She appears well-developed and well-nourished. HENT:   Head: Normocephalic and atraumatic. Right Ear: External ear normal.   Left Ear: External ear normal.   Nose: Nose normal.   Mouth/Throat: Oropharynx is clear and moist.   Eyes: Conjunctivae and EOM are normal. Pupils are equal, round, and reactive to light. Right eye exhibits no discharge. Left eye exhibits no discharge. Neck: Normal range of motion. Neck supple. No tracheal deviation present. Cardiovascular: Normal rate, regular rhythm, normal heart sounds and intact distal pulses. Exam reveals no gallop and no friction rub. No murmur heard. Pulmonary/Chest: Effort normal and breath sounds normal. No respiratory distress. She has no wheezes. She has no rales. She exhibits no tenderness. Abdominal: Soft. Bowel sounds are normal. She exhibits no distension and no mass. There is no tenderness. There is no rebound and no guarding. Musculoskeletal: Normal range of motion. She exhibits no edema or tenderness. Neurological: She is alert and oriented to person, place, and time. She has normal reflexes. No cranial nerve deficit. Skin: No rash noted. She is not diaphoretic.    Psychiatric: She has a normal mood admission orders. CRITICAL CARE:   The patient admits to smoking. 3 minutes of time was spent discussing how this can worsen underlying breathing problems, COPD hypertension and heart disease or cause them to develop. HEART Risk Score for Chest Pain Patients                       Patient Score  History   Highly suspicious    2            Moderately suspicious   1    = 1    Slightly or non suspicious   0      ECG   Significant STD    2        Nonspecific repolarization   1     = 0    Normal (no change from previous)  0      Age   >64      2      > 45 - <65     1     = 1   < 46      0        Risk Factors (Risk factors include: Hypercholest, HTN, DM, Smoking, Family Hx, Obesity)  >2 risk factors    2     I  2 risk factors   1     = 2  No risk factors    0     Troponin   >3times normal limit    2      >1 time - <3 times normal limit  1   = 0*    Normal trop     0     -----------------------------------------------------------------------------------------      TOTAL RISK SCORE =  4*    Score 0  3 =  2.5% MACE over next 6 wks = Discharge home  Score 4  6 =  20.3% MACE over next 6 wks = Obs admit  Score 7 - 10 = 72.7% MACE over next 6 wks = Early invasive Rx        CONSULTS:  IP CONSULT TO PRIMARY CARE PROVIDER      FINAL IMPRESSION      1. Chest pain, unspecified type    2. Elevated LFTs    3. Hypokalemia    4. Smoking addiction          DISPOSITION/PLAN:  DISPOSITION Decision To Admit 03/06/2018 05:52:26 AM        PATIENT REFERRED TO:  No follow-up provider specified.     DISCHARGE MEDICATIONS:  New Prescriptions    No medications on file       (Please note that portions of this note were completed with a voice recognition program.  Efforts were made to edit the dictations but occasionally words are mis-transcribed.)    Abdelrahman Montes MD  Attending Emergency Physician            Abdelrahman Montes MD  03/06/18 2345

## 2018-03-06 NOTE — CARE COORDINATION
CASE MANAGEMENT NOTE:    Admission Date:  3/6/2018 Amaod Mann is a 61 y.o.  female    Admitted for : Chest pain [R07.9]    Met with:  Patient    PCP:  Dr Wilmer Claude:  Annita Roberts:  independently at home             Current Services PTA:  No    Is patient agreeable to VNS: No    Freedom of choice provided: NA         VNS chosen:  NA    DME:  none    Home Oxygen: No    Nebulizer: No    Supplier: N/A    Potential Assistance Needed: Follow for needs    SNF needed: No    Pharmacy:  [facility]       Does Patient want to use MEDS to BEDS? No    Family Members/Caregivers that pt would like involved in their care:    No    If yes, list name here:  n/a    Transportation Provider:  Patient and Family                      Discharge Plan:  3/6/2018 No [address]; From single story home alone- independent and drives; DME-None; Declines VNS; Admitted with chest pain and had stress test today; Anticipate discharge as early as today; Will continue to follow for discharge needs//BLUE               Readmission Risk              Readmission Risk:        13.75       Age 72 or Greater:  0    Admitted from SNF or Requires Paid or Family Care:  0    Currently has CHF,COPD,ARF,CRI,or is on dialysis:  0    Takes more than 5 Prescription Medications:  0    Takes Digoxin,Insulin,Anticoagulants,Narcotics or ASA/Plavix:  [address] in Past 12 Months:  10    On Disability:  0    Patient Considers own Health:  3.75          Electronically signed by:  Michi Johnson RN on 3/6/2018 at 4:15 PM

## 2018-03-06 NOTE — DISCHARGE SUMMARY
[address] [facility]    Patient name:  Mariya Sun  YOB: 1960  Primary Care Physician: Soraida Ortiz MD    Date of admission:  3/6/2018  5:04 AM  Date of discharge:3/6/2018       DISCHARGE DIAGNOSES       Principal Problem:    Chest pain, atypical  Active Problems:    Tobacco abuse disorder    Hypokalemia    Hypocalcemia    S/P cholecystectomy    Chest pain  Resolved Problems:    * No resolved hospital problems. *      HOSPITAL COURSE      Patient to recently had a cholecystectomy done admitted with chest pain CTA was negative  Stress negative   cta no pe     Consultants:  - none    Procedures:ct a no pe  Stress test negative  Ef 44      DISCHARGE MEDICATIONS        Lanette Brennan   Home Medication Instructions YSI:[id number]    Printed on:03/06/18 3441   Medication Information                      ALPRAZolam (XANAX) 0.5 MG tablet  Take 0.5 mg by mouth nightly as needed for Sleep. aspirin 81 MG tablet  Take 81 mg by mouth daily. atorvastatin (LIPITOR) 10 MG tablet  Take 1 tablet by mouth nightly             cephALEXin (KEFLEX) 500 MG capsule  500 mgTake three times daily             citalopram (CELEXA) 40 MG tablet  Take 40 mg by mouth daily. nicotine (NICODERM CQ) 21 MG/24HR  Place 1 patch onto the skin daily             nitroGLYCERIN (NITROSTAT) 0.4 MG SL tablet  up to max of 3 total doses. If no relief after 1 dose, call 911.              ondansetron (ZOFRAN) 4 MG tablet  Take every six hours as needed             pantoprazole (PROTONIX) 40 MG tablet  Take 1 tablet by mouth daily             polyethylene glycol (MIRALAX) packet  Take 17 g by mouth daily             venlafaxine 225 MG extended release tablet  Take 225 mg by mouth daily (with breakfast)             verapamil (CALAN) 120 MG tablet  Take 120 mg by mouth daily Take 1 1/2 tabs daily             vitamin D (ERGOCALCIFEROL) 43202 units CAPS capsule  Take 50,000 Units by mouth once a week             VITAMIN D, ERGOCALCIFEROL, PO  Take 1.25 mg by mouth                 DISPOSITION AND FOLLOW-UP     Disposition: home      Condition: Stable     Diet:  cardio    Activity: As tolerated     Follow-up:   with Kobe Lloyd MD,  Dr Rosamaria Caban cardio    Discharge time spent on pt and paperworki more than MD MARY Rider [address].    Phone [phone]   Fax: [phone]  Answering Service: [phone]

## 2018-03-07 ENCOUNTER — TELEPHONE (OUTPATIENT)
Dept: INTERNAL MEDICINE CLINIC | Age: 58
End: 2018-03-07

## 2018-03-08 LAB
EKG ATRIAL RATE: 70 BPM
EKG P AXIS: 60 DEGREES
EKG P-R INTERVAL: 180 MS
EKG Q-T INTERVAL: 442 MS
EKG QRS DURATION: 104 MS
EKG QTC CALCULATION (BAZETT): 477 MS
EKG R AXIS: 60 DEGREES
EKG T AXIS: 85 DEGREES
EKG VENTRICULAR RATE: 70 BPM

## 2018-04-16 ENCOUNTER — HOSPITAL ENCOUNTER (OUTPATIENT)
Dept: CARDIAC CATH/INVASIVE PROCEDURES | Age: 58
Setting detail: OBSERVATION
Discharge: HOME | End: 2018-04-17
Attending: INTERNAL MEDICINE | Admitting: INTERNAL MEDICINE
Payer: COMMERCIAL

## 2018-04-16 DIAGNOSIS — I25.10 CAD IN NATIVE ARTERY: Primary | ICD-10-CM

## 2018-04-16 LAB
ACTIVATED CLOTTING TIME: 205 SEC (ref 79–149)
ACTIVATED CLOTTING TIME: 241 SEC (ref 79–149)
GFR NON-AFRICAN AMERICAN: >60 ML/MIN
GFR SERPL CREATININE-BSD FRML MDRD: >60 ML/MIN
GFR SERPL CREATININE-BSD FRML MDRD: NORMAL ML/MIN/{1.73_M2}
GLUCOSE BLD-MCNC: 105 MG/DL (ref 74–100)
LV EF: 40 %
LVEF MODALITY: NORMAL
PLATELET # BLD: 266 K/UL (ref 138–453)
POC CHLORIDE: 109 MMOL/L (ref 98–107)
POC CREATININE: 0.78 MG/DL (ref 0.51–1.19)
POC HEMATOCRIT: 38 % (ref 36–46)
POC HEMOGLOBIN: 12.8 G/DL (ref 12–16)
POC POTASSIUM: 4.2 MMOL/L (ref 3.5–4.5)
POC SODIUM: 142 MMOL/L (ref 138–146)

## 2018-04-16 PROCEDURE — 85347 COAGULATION TIME ACTIVATED: CPT

## 2018-04-16 PROCEDURE — 6370000000 HC RX 637 (ALT 250 FOR IP): Performed by: INTERNAL MEDICINE

## 2018-04-16 PROCEDURE — 7100000010 HC PHASE II RECOVERY - FIRST 15 MIN

## 2018-04-16 PROCEDURE — C1887 CATHETER, GUIDING: HCPCS

## 2018-04-16 PROCEDURE — 84295 ASSAY OF SERUM SODIUM: CPT

## 2018-04-16 PROCEDURE — 93458 L HRT ARTERY/VENTRICLE ANGIO: CPT

## 2018-04-16 PROCEDURE — C1769 GUIDE WIRE: HCPCS

## 2018-04-16 PROCEDURE — 82947 ASSAY GLUCOSE BLOOD QUANT: CPT

## 2018-04-16 PROCEDURE — 85049 AUTOMATED PLATELET COUNT: CPT

## 2018-04-16 PROCEDURE — 82435 ASSAY OF BLOOD CHLORIDE: CPT

## 2018-04-16 PROCEDURE — 6360000002 HC RX W HCPCS

## 2018-04-16 PROCEDURE — 6370000000 HC RX 637 (ALT 250 FOR IP)

## 2018-04-16 PROCEDURE — 85014 HEMATOCRIT: CPT

## 2018-04-16 PROCEDURE — 84132 ASSAY OF SERUM POTASSIUM: CPT

## 2018-04-16 PROCEDURE — C1874 STENT, COATED/COV W/DEL SYS: HCPCS

## 2018-04-16 PROCEDURE — 2500000003 HC RX 250 WO HCPCS

## 2018-04-16 PROCEDURE — G0378 HOSPITAL OBSERVATION PER HR: HCPCS

## 2018-04-16 PROCEDURE — 7100000011 HC PHASE II RECOVERY - ADDTL 15 MIN

## 2018-04-16 PROCEDURE — C1894 INTRO/SHEATH, NON-LASER: HCPCS

## 2018-04-16 PROCEDURE — 6360000004 HC RX CONTRAST MEDICATION

## 2018-04-16 PROCEDURE — 2709999900 HC NON-CHARGEABLE SUPPLY

## 2018-04-16 PROCEDURE — 92928 PRQ TCAT PLMT NTRAC ST 1 LES: CPT

## 2018-04-16 PROCEDURE — C1725 CATH, TRANSLUMIN NON-LASER: HCPCS

## 2018-04-16 PROCEDURE — 82565 ASSAY OF CREATININE: CPT

## 2018-04-16 RX ORDER — SODIUM CHLORIDE 0.9 % (FLUSH) 0.9 %
10 SYRINGE (ML) INJECTION PRN
Status: DISCONTINUED | OUTPATIENT
Start: 2018-04-16 | End: 2018-04-17 | Stop reason: HOSPADM

## 2018-04-16 RX ORDER — SODIUM CHLORIDE 9 MG/ML
INJECTION, SOLUTION INTRAVENOUS CONTINUOUS
Status: DISCONTINUED | OUTPATIENT
Start: 2018-04-16 | End: 2018-04-17 | Stop reason: HOSPADM

## 2018-04-16 RX ORDER — PANTOPRAZOLE SODIUM 40 MG/1
40 TABLET, DELAYED RELEASE ORAL DAILY
Status: DISCONTINUED | OUTPATIENT
Start: 2018-04-16 | End: 2018-04-17 | Stop reason: HOSPADM

## 2018-04-16 RX ORDER — LISINOPRIL 5 MG/1
5 TABLET ORAL DAILY
Status: DISCONTINUED | OUTPATIENT
Start: 2018-04-16 | End: 2018-04-17 | Stop reason: HOSPADM

## 2018-04-16 RX ORDER — CLOPIDOGREL BISULFATE 75 MG/1
75 TABLET ORAL DAILY
Status: DISCONTINUED | OUTPATIENT
Start: 2018-04-17 | End: 2018-04-17 | Stop reason: HOSPADM

## 2018-04-16 RX ORDER — ISOSORBIDE MONONITRATE 30 MG/1
30 TABLET, EXTENDED RELEASE ORAL DAILY
COMMUNITY

## 2018-04-16 RX ORDER — ASPIRIN 81 MG/1
81 TABLET, CHEWABLE ORAL DAILY
Status: DISCONTINUED | OUTPATIENT
Start: 2018-04-17 | End: 2018-04-17 | Stop reason: HOSPADM

## 2018-04-16 RX ORDER — ONDANSETRON 2 MG/ML
4 INJECTION INTRAMUSCULAR; INTRAVENOUS EVERY 6 HOURS PRN
Status: DISCONTINUED | OUTPATIENT
Start: 2018-04-16 | End: 2018-04-17 | Stop reason: HOSPADM

## 2018-04-16 RX ORDER — METOPROLOL SUCCINATE 25 MG/1
25 TABLET, EXTENDED RELEASE ORAL DAILY
COMMUNITY

## 2018-04-16 RX ORDER — SODIUM CHLORIDE 0.9 % (FLUSH) 0.9 %
10 SYRINGE (ML) INJECTION EVERY 12 HOURS SCHEDULED
Status: DISCONTINUED | OUTPATIENT
Start: 2018-04-16 | End: 2018-04-17 | Stop reason: HOSPADM

## 2018-04-16 RX ORDER — METOPROLOL SUCCINATE 25 MG/1
25 TABLET, EXTENDED RELEASE ORAL DAILY
Status: DISCONTINUED | OUTPATIENT
Start: 2018-04-16 | End: 2018-04-17 | Stop reason: HOSPADM

## 2018-04-16 RX ORDER — ASPIRIN 81 MG/1
81 TABLET ORAL DAILY
Status: DISCONTINUED | OUTPATIENT
Start: 2018-04-16 | End: 2018-04-17 | Stop reason: HOSPADM

## 2018-04-16 RX ORDER — ISOSORBIDE MONONITRATE 30 MG/1
30 TABLET, EXTENDED RELEASE ORAL DAILY
Status: DISCONTINUED | OUTPATIENT
Start: 2018-04-16 | End: 2018-04-17 | Stop reason: HOSPADM

## 2018-04-16 RX ORDER — ALPRAZOLAM 0.25 MG/1
0.5 TABLET ORAL NIGHTLY PRN
Status: DISCONTINUED | OUTPATIENT
Start: 2018-04-16 | End: 2018-04-17 | Stop reason: HOSPADM

## 2018-04-16 RX ORDER — VENLAFAXINE HYDROCHLORIDE 75 MG/1
225 CAPSULE, EXTENDED RELEASE ORAL
Status: DISCONTINUED | OUTPATIENT
Start: 2018-04-17 | End: 2018-04-17 | Stop reason: HOSPADM

## 2018-04-16 RX ORDER — ERGOCALCIFEROL 1.25 MG/1
50000 CAPSULE ORAL WEEKLY
Status: DISCONTINUED | OUTPATIENT
Start: 2018-04-17 | End: 2018-04-17 | Stop reason: HOSPADM

## 2018-04-16 RX ORDER — ATORVASTATIN CALCIUM 80 MG/1
80 TABLET, FILM COATED ORAL NIGHTLY
Status: DISCONTINUED | OUTPATIENT
Start: 2018-04-16 | End: 2018-04-17 | Stop reason: HOSPADM

## 2018-04-16 RX ADMIN — ASPIRIN 81 MG: 81 TABLET, COATED ORAL at 18:50

## 2018-04-16 RX ADMIN — ISOSORBIDE MONONITRATE 30 MG: 30 TABLET ORAL at 18:50

## 2018-04-16 RX ADMIN — ATORVASTATIN CALCIUM 80 MG: 80 TABLET, FILM COATED ORAL at 21:48

## 2018-04-16 RX ADMIN — LISINOPRIL 5 MG: 5 TABLET ORAL at 18:50

## 2018-04-16 RX ADMIN — PANTOPRAZOLE SODIUM 40 MG: 40 TABLET, DELAYED RELEASE ORAL at 18:50

## 2018-04-16 RX ADMIN — SODIUM CHLORIDE: 9 INJECTION, SOLUTION INTRAVENOUS at 12:35

## 2018-04-16 RX ADMIN — METOPROLOL SUCCINATE 25 MG: 25 TABLET, FILM COATED, EXTENDED RELEASE ORAL at 18:50

## 2018-04-17 VITALS
HEART RATE: 64 BPM | BODY MASS INDEX: 29.89 KG/M2 | SYSTOLIC BLOOD PRESSURE: 139 MMHG | OXYGEN SATURATION: 96 % | TEMPERATURE: 97.9 F | WEIGHT: 179.4 LBS | HEIGHT: 65 IN | RESPIRATION RATE: 15 BRPM | DIASTOLIC BLOOD PRESSURE: 78 MMHG

## 2018-04-17 PROBLEM — I25.10 CAD IN NATIVE ARTERY: Status: ACTIVE | Noted: 2018-04-17

## 2018-04-17 PROCEDURE — 6370000000 HC RX 637 (ALT 250 FOR IP): Performed by: INTERNAL MEDICINE

## 2018-04-17 PROCEDURE — 94762 N-INVAS EAR/PLS OXIMTRY CONT: CPT

## 2018-04-17 PROCEDURE — G0378 HOSPITAL OBSERVATION PER HR: HCPCS

## 2018-04-17 RX ORDER — ATORVASTATIN CALCIUM 80 MG/1
80 TABLET, FILM COATED ORAL NIGHTLY
Qty: 30 TABLET | Refills: 3 | Status: SHIPPED | OUTPATIENT
Start: 2018-04-17

## 2018-04-17 RX ORDER — CLOPIDOGREL BISULFATE 75 MG/1
75 TABLET ORAL DAILY
Qty: 30 TABLET | Refills: 3 | Status: SHIPPED | OUTPATIENT
Start: 2018-04-18

## 2018-04-17 RX ORDER — LISINOPRIL 5 MG/1
5 TABLET ORAL DAILY
Qty: 30 TABLET | Refills: 3 | Status: ON HOLD | OUTPATIENT
Start: 2018-04-18 | End: 2018-11-15

## 2018-04-17 RX ADMIN — PANTOPRAZOLE SODIUM 40 MG: 40 TABLET, DELAYED RELEASE ORAL at 09:06

## 2018-04-17 RX ADMIN — CLOPIDOGREL 75 MG: 75 TABLET, FILM COATED ORAL at 09:06

## 2018-04-17 RX ADMIN — METOPROLOL SUCCINATE 25 MG: 25 TABLET, FILM COATED, EXTENDED RELEASE ORAL at 09:06

## 2018-04-17 RX ADMIN — VENLAFAXINE HYDROCHLORIDE 225 MG: 75 CAPSULE, EXTENDED RELEASE ORAL at 09:05

## 2018-04-17 RX ADMIN — LISINOPRIL 5 MG: 5 TABLET ORAL at 09:06

## 2018-04-17 RX ADMIN — ISOSORBIDE MONONITRATE 30 MG: 30 TABLET ORAL at 09:07

## 2018-04-17 RX ADMIN — ASPIRIN 81 MG: 81 TABLET, COATED ORAL at 09:06

## 2018-04-18 ENCOUNTER — HOSPITAL ENCOUNTER (OUTPATIENT)
Age: 58
Setting detail: OBSERVATION
LOS: 1 days | Discharge: LEFT BEFORE DISCHARGE | End: 2018-04-18
Attending: EMERGENCY MEDICINE | Admitting: INTERNAL MEDICINE
Payer: COMMERCIAL

## 2018-04-18 ENCOUNTER — APPOINTMENT (OUTPATIENT)
Dept: CT IMAGING | Age: 58
End: 2018-04-18
Payer: COMMERCIAL

## 2018-04-18 ENCOUNTER — APPOINTMENT (OUTPATIENT)
Dept: GENERAL RADIOLOGY | Age: 58
End: 2018-04-18
Payer: COMMERCIAL

## 2018-04-18 VITALS
BODY MASS INDEX: 28.09 KG/M2 | WEIGHT: 179 LBS | OXYGEN SATURATION: 96 % | HEART RATE: 80 BPM | SYSTOLIC BLOOD PRESSURE: 128 MMHG | DIASTOLIC BLOOD PRESSURE: 80 MMHG | RESPIRATION RATE: 16 BRPM | TEMPERATURE: 97.8 F | HEIGHT: 67 IN

## 2018-04-18 DIAGNOSIS — T81.718A FEMORAL ARTERY PSEUDOANEURYSM COMPLICATING CARDIAC CATHETERIZATION (HCC): Primary | ICD-10-CM

## 2018-04-18 DIAGNOSIS — I72.4 FEMORAL ARTERY PSEUDOANEURYSM COMPLICATING CARDIAC CATHETERIZATION (HCC): Primary | ICD-10-CM

## 2018-04-18 DIAGNOSIS — R07.9 CHEST PAIN, UNSPECIFIED TYPE: ICD-10-CM

## 2018-04-18 LAB
ABSOLUTE EOS #: 0.2 K/UL (ref 0–0.4)
ABSOLUTE IMMATURE GRANULOCYTE: ABNORMAL K/UL (ref 0–0.3)
ABSOLUTE LYMPH #: 2.2 K/UL (ref 1–4.8)
ABSOLUTE MONO #: 0.7 K/UL (ref 0.1–1.3)
ANION GAP SERPL CALCULATED.3IONS-SCNC: 12 MMOL/L (ref 9–17)
BASOPHILS # BLD: 0 % (ref 0–2)
BASOPHILS ABSOLUTE: 0 K/UL (ref 0–0.2)
BUN BLDV-MCNC: 10 MG/DL (ref 6–20)
BUN/CREAT BLD: ABNORMAL (ref 9–20)
CALCIUM SERPL-MCNC: 8.5 MG/DL (ref 8.6–10.4)
CHLORIDE BLD-SCNC: 106 MMOL/L (ref 98–107)
CO2: 23 MMOL/L (ref 20–31)
CREAT SERPL-MCNC: 0.66 MG/DL (ref 0.5–0.9)
DIFFERENTIAL TYPE: ABNORMAL
EOSINOPHILS RELATIVE PERCENT: 2 % (ref 0–4)
GFR AFRICAN AMERICAN: >60 ML/MIN
GFR NON-AFRICAN AMERICAN: >60 ML/MIN
GFR SERPL CREATININE-BSD FRML MDRD: ABNORMAL ML/MIN/{1.73_M2}
GFR SERPL CREATININE-BSD FRML MDRD: ABNORMAL ML/MIN/{1.73_M2}
GLUCOSE BLD-MCNC: 101 MG/DL (ref 70–99)
HCT VFR BLD CALC: 37.5 % (ref 36–46)
HEMOGLOBIN: 12.6 G/DL (ref 12–16)
IMMATURE GRANULOCYTES: ABNORMAL %
INR BLD: 1
LYMPHOCYTES # BLD: 24 % (ref 24–44)
MCH RBC QN AUTO: 32.9 PG (ref 26–34)
MCHC RBC AUTO-ENTMCNC: 33.7 G/DL (ref 31–37)
MCV RBC AUTO: 97.5 FL (ref 80–100)
MONOCYTES # BLD: 8 % (ref 1–7)
MYOGLOBIN: 29 NG/ML (ref 25–58)
NRBC AUTOMATED: ABNORMAL PER 100 WBC
PDW BLD-RTO: 14.3 % (ref 11.5–14.9)
PLATELET # BLD: 224 K/UL (ref 150–450)
PLATELET ESTIMATE: ABNORMAL
PMV BLD AUTO: 9.3 FL (ref 6–12)
POTASSIUM SERPL-SCNC: 4.1 MMOL/L (ref 3.7–5.3)
PROTHROMBIN TIME: 10.1 SEC (ref 9.7–12)
RBC # BLD: 3.84 M/UL (ref 4–5.2)
RBC # BLD: ABNORMAL 10*6/UL
SEG NEUTROPHILS: 66 % (ref 36–66)
SEGMENTED NEUTROPHILS ABSOLUTE COUNT: 6 K/UL (ref 1.3–9.1)
SODIUM BLD-SCNC: 141 MMOL/L (ref 135–144)
TROPONIN INTERP: NORMAL
TROPONIN T: <0.03 NG/ML
WBC # BLD: 9 K/UL (ref 3.5–11)
WBC # BLD: ABNORMAL 10*3/UL

## 2018-04-18 PROCEDURE — 93005 ELECTROCARDIOGRAM TRACING: CPT

## 2018-04-18 PROCEDURE — 36415 COLL VENOUS BLD VENIPUNCTURE: CPT

## 2018-04-18 PROCEDURE — 99285 EMERGENCY DEPT VISIT HI MDM: CPT

## 2018-04-18 PROCEDURE — 96376 TX/PRO/DX INJ SAME DRUG ADON: CPT

## 2018-04-18 PROCEDURE — 85610 PROTHROMBIN TIME: CPT

## 2018-04-18 PROCEDURE — 71045 X-RAY EXAM CHEST 1 VIEW: CPT

## 2018-04-18 PROCEDURE — 84484 ASSAY OF TROPONIN QUANT: CPT

## 2018-04-18 PROCEDURE — 80048 BASIC METABOLIC PNL TOTAL CA: CPT

## 2018-04-18 PROCEDURE — 83874 ASSAY OF MYOGLOBIN: CPT

## 2018-04-18 PROCEDURE — 6370000000 HC RX 637 (ALT 250 FOR IP): Performed by: EMERGENCY MEDICINE

## 2018-04-18 PROCEDURE — 6360000002 HC RX W HCPCS: Performed by: EMERGENCY MEDICINE

## 2018-04-18 PROCEDURE — G0378 HOSPITAL OBSERVATION PER HR: HCPCS

## 2018-04-18 PROCEDURE — 93926 LOWER EXTREMITY STUDY: CPT

## 2018-04-18 PROCEDURE — 71275 CT ANGIOGRAPHY CHEST: CPT

## 2018-04-18 PROCEDURE — 96374 THER/PROPH/DIAG INJ IV PUSH: CPT

## 2018-04-18 PROCEDURE — 85025 COMPLETE CBC W/AUTO DIFF WBC: CPT

## 2018-04-18 PROCEDURE — 6360000004 HC RX CONTRAST MEDICATION: Performed by: EMERGENCY MEDICINE

## 2018-04-18 PROCEDURE — 2580000003 HC RX 258: Performed by: EMERGENCY MEDICINE

## 2018-04-18 RX ORDER — MORPHINE SULFATE 2 MG/ML
4 INJECTION, SOLUTION INTRAMUSCULAR; INTRAVENOUS ONCE
Status: COMPLETED | OUTPATIENT
Start: 2018-04-18 | End: 2018-04-18

## 2018-04-18 RX ORDER — SODIUM CHLORIDE 0.9 % (FLUSH) 0.9 %
10 SYRINGE (ML) INJECTION PRN
Status: CANCELLED | OUTPATIENT
Start: 2018-04-18

## 2018-04-18 RX ORDER — 0.9 % SODIUM CHLORIDE 0.9 %
100 INTRAVENOUS SOLUTION INTRAVENOUS ONCE
Status: COMPLETED | OUTPATIENT
Start: 2018-04-18 | End: 2018-04-18

## 2018-04-18 RX ORDER — MORPHINE SULFATE 8 MG/ML
6 INJECTION, SOLUTION INTRAMUSCULAR; INTRAVENOUS ONCE
Status: DISCONTINUED | OUTPATIENT
Start: 2018-04-18 | End: 2018-04-18 | Stop reason: CLARIF

## 2018-04-18 RX ORDER — MAGNESIUM HYDROXIDE/ALUMINUM HYDROXICE/SIMETHICONE 120; 1200; 1200 MG/30ML; MG/30ML; MG/30ML
15 SUSPENSION ORAL ONCE
Status: COMPLETED | OUTPATIENT
Start: 2018-04-18 | End: 2018-04-18

## 2018-04-18 RX ORDER — ACETAMINOPHEN 325 MG/1
650 TABLET ORAL EVERY 4 HOURS PRN
Status: CANCELLED | OUTPATIENT
Start: 2018-04-18

## 2018-04-18 RX ORDER — ONDANSETRON 2 MG/ML
4 INJECTION INTRAMUSCULAR; INTRAVENOUS EVERY 8 HOURS PRN
Status: CANCELLED | OUTPATIENT
Start: 2018-04-18

## 2018-04-18 RX ORDER — NITROGLYCERIN 0.4 MG/1
0.4 TABLET SUBLINGUAL EVERY 5 MIN PRN
Status: DISCONTINUED | OUTPATIENT
Start: 2018-04-18 | End: 2018-04-19 | Stop reason: HOSPADM

## 2018-04-18 RX ORDER — MORPHINE SULFATE 2 MG/ML
6 INJECTION, SOLUTION INTRAMUSCULAR; INTRAVENOUS ONCE
Status: COMPLETED | OUTPATIENT
Start: 2018-04-18 | End: 2018-04-18

## 2018-04-18 RX ORDER — SODIUM CHLORIDE 0.9 % (FLUSH) 0.9 %
10 SYRINGE (ML) INJECTION EVERY 12 HOURS SCHEDULED
Status: CANCELLED | OUTPATIENT
Start: 2018-04-18

## 2018-04-18 RX ORDER — SODIUM CHLORIDE 0.9 % (FLUSH) 0.9 %
10 SYRINGE (ML) INJECTION PRN
Status: DISCONTINUED | OUTPATIENT
Start: 2018-04-18 | End: 2018-04-19 | Stop reason: HOSPADM

## 2018-04-18 RX ADMIN — Medication 6 MG: at 18:48

## 2018-04-18 RX ADMIN — NITROGLYCERIN 0.4 MG: 0.4 TABLET SUBLINGUAL at 16:11

## 2018-04-18 RX ADMIN — Medication 10 ML: at 18:34

## 2018-04-18 RX ADMIN — SODIUM CHLORIDE 100 ML: 9 INJECTION, SOLUTION INTRAVENOUS at 18:34

## 2018-04-18 RX ADMIN — Medication 2 MG: at 16:39

## 2018-04-18 RX ADMIN — ALUMINUM HYDROXIDE, MAGNESIUM HYDROXIDE, AND SIMETHICONE 15 ML: 200; 200; 20 SUSPENSION ORAL at 17:16

## 2018-04-18 RX ADMIN — NITROGLYCERIN 0.4 MG: 0.4 TABLET SUBLINGUAL at 20:33

## 2018-04-18 RX ADMIN — Medication 4 MG: at 14:58

## 2018-04-18 RX ADMIN — IOPAMIDOL 100 ML: 755 INJECTION, SOLUTION INTRAVENOUS at 18:34

## 2018-04-18 RX ADMIN — NITROGLYCERIN 0.4 MG: 0.4 TABLET SUBLINGUAL at 16:06

## 2018-04-18 RX ADMIN — Medication 15 ML: at 17:16

## 2018-04-18 ASSESSMENT — ENCOUNTER SYMPTOMS
ABDOMINAL PAIN: 0
SHORTNESS OF BREATH: 0
NAUSEA: 0
BACK PAIN: 0
VOMITING: 0

## 2018-04-18 ASSESSMENT — PAIN SCALES - GENERAL
PAINLEVEL_OUTOF10: 8
PAINLEVEL_OUTOF10: 5
PAINLEVEL_OUTOF10: 5
PAINLEVEL_OUTOF10: 8
PAINLEVEL_OUTOF10: 6
PAINLEVEL_OUTOF10: 0
PAINLEVEL_OUTOF10: 5
PAINLEVEL_OUTOF10: 5

## 2018-04-18 ASSESSMENT — PAIN DESCRIPTION - ORIENTATION
ORIENTATION: RIGHT
ORIENTATION: RIGHT

## 2018-04-18 ASSESSMENT — PAIN DESCRIPTION - LOCATION
LOCATION: GROIN
LOCATION: LEG;GROIN

## 2018-04-18 ASSESSMENT — PAIN DESCRIPTION - PAIN TYPE
TYPE: ACUTE PAIN

## 2018-04-19 LAB
EKG ATRIAL RATE: 61 BPM
EKG ATRIAL RATE: 68 BPM
EKG P AXIS: 21 DEGREES
EKG P AXIS: 50 DEGREES
EKG P-R INTERVAL: 156 MS
EKG P-R INTERVAL: 166 MS
EKG Q-T INTERVAL: 400 MS
EKG Q-T INTERVAL: 400 MS
EKG QRS DURATION: 86 MS
EKG QRS DURATION: 88 MS
EKG QTC CALCULATION (BAZETT): 402 MS
EKG QTC CALCULATION (BAZETT): 425 MS
EKG R AXIS: 53 DEGREES
EKG R AXIS: 67 DEGREES
EKG T AXIS: 71 DEGREES
EKG T AXIS: 76 DEGREES
EKG VENTRICULAR RATE: 61 BPM
EKG VENTRICULAR RATE: 68 BPM

## 2018-04-24 ENCOUNTER — HOSPITAL ENCOUNTER (EMERGENCY)
Age: 58
Discharge: HOME | End: 2018-04-24
Attending: EMERGENCY MEDICINE
Payer: COMMERCIAL

## 2018-04-24 VITALS
OXYGEN SATURATION: 96 % | RESPIRATION RATE: 16 BRPM | HEART RATE: 66 BPM | TEMPERATURE: 98.4 F | SYSTOLIC BLOOD PRESSURE: 111 MMHG | DIASTOLIC BLOOD PRESSURE: 63 MMHG

## 2018-04-24 DIAGNOSIS — R19.09 INGUINAL SWELLING: Primary | ICD-10-CM

## 2018-04-24 LAB
ABSOLUTE EOS #: 0.16 K/UL (ref 0–0.44)
ABSOLUTE IMMATURE GRANULOCYTE: <0.03 K/UL (ref 0–0.3)
ABSOLUTE LYMPH #: 2.77 K/UL (ref 1.1–3.7)
ABSOLUTE MONO #: 0.56 K/UL (ref 0.1–1.2)
ANION GAP SERPL CALCULATED.3IONS-SCNC: 8 MMOL/L (ref 9–17)
BASOPHILS # BLD: 1 % (ref 0–2)
BASOPHILS ABSOLUTE: 0.04 K/UL (ref 0–0.2)
BUN BLDV-MCNC: 12 MG/DL (ref 6–20)
BUN/CREAT BLD: ABNORMAL (ref 9–20)
CALCIUM SERPL-MCNC: 8.5 MG/DL (ref 8.6–10.4)
CHLORIDE BLD-SCNC: 105 MMOL/L (ref 98–107)
CO2: 27 MMOL/L (ref 20–31)
CREAT SERPL-MCNC: 0.64 MG/DL (ref 0.5–0.9)
DIFFERENTIAL TYPE: ABNORMAL
EOSINOPHILS RELATIVE PERCENT: 2 % (ref 1–4)
GFR AFRICAN AMERICAN: >60 ML/MIN
GFR NON-AFRICAN AMERICAN: >60 ML/MIN
GFR SERPL CREATININE-BSD FRML MDRD: ABNORMAL ML/MIN/{1.73_M2}
GFR SERPL CREATININE-BSD FRML MDRD: ABNORMAL ML/MIN/{1.73_M2}
GLUCOSE BLD-MCNC: 123 MG/DL (ref 70–99)
HCT VFR BLD CALC: 34.8 % (ref 36.3–47.1)
HEMOGLOBIN: 11 G/DL (ref 11.9–15.1)
IMMATURE GRANULOCYTES: 0 %
LYMPHOCYTES # BLD: 39 % (ref 24–43)
MCH RBC QN AUTO: 31.4 PG (ref 25.2–33.5)
MCHC RBC AUTO-ENTMCNC: 31.6 G/DL (ref 28.4–34.8)
MCV RBC AUTO: 99.4 FL (ref 82.6–102.9)
MONOCYTES # BLD: 8 % (ref 3–12)
NRBC AUTOMATED: 0 PER 100 WBC
PDW BLD-RTO: 13.9 % (ref 11.8–14.4)
PLATELET # BLD: 303 K/UL (ref 138–453)
PLATELET ESTIMATE: ABNORMAL
PMV BLD AUTO: 10.5 FL (ref 8.1–13.5)
POTASSIUM SERPL-SCNC: 3.6 MMOL/L (ref 3.7–5.3)
RBC # BLD: 3.5 M/UL (ref 3.95–5.11)
RBC # BLD: ABNORMAL 10*6/UL
SEG NEUTROPHILS: 50 % (ref 36–65)
SEGMENTED NEUTROPHILS ABSOLUTE COUNT: 3.63 K/UL (ref 1.5–8.1)
SODIUM BLD-SCNC: 140 MMOL/L (ref 135–144)
WBC # BLD: 7.2 K/UL (ref 3.5–11.3)
WBC # BLD: ABNORMAL 10*3/UL

## 2018-04-24 PROCEDURE — 99285 EMERGENCY DEPT VISIT HI MDM: CPT

## 2018-04-24 PROCEDURE — 6370000000 HC RX 637 (ALT 250 FOR IP): Performed by: PHYSICIAN ASSISTANT

## 2018-04-24 PROCEDURE — 80048 BASIC METABOLIC PNL TOTAL CA: CPT

## 2018-04-24 PROCEDURE — 93926 LOWER EXTREMITY STUDY: CPT

## 2018-04-24 PROCEDURE — 85025 COMPLETE CBC W/AUTO DIFF WBC: CPT

## 2018-04-24 RX ORDER — OXYCODONE HYDROCHLORIDE AND ACETAMINOPHEN 5; 325 MG/1; MG/1
2 TABLET ORAL ONCE
Status: COMPLETED | OUTPATIENT
Start: 2018-04-24 | End: 2018-04-24

## 2018-04-24 RX ADMIN — OXYCODONE HYDROCHLORIDE AND ACETAMINOPHEN 2 TABLET: 5; 325 TABLET ORAL at 16:27

## 2018-04-24 ASSESSMENT — PAIN DESCRIPTION - DESCRIPTORS
DESCRIPTORS: PRESSURE;SORE
DESCRIPTORS: PRESSURE

## 2018-04-24 ASSESSMENT — ENCOUNTER SYMPTOMS
EYE PAIN: 0
EYE ITCHING: 0
RHINORRHEA: 0
COUGH: 0
BACK PAIN: 0
SORE THROAT: 0
VOMITING: 0
COLOR CHANGE: 0
NAUSEA: 0
EYE DISCHARGE: 0
WHEEZING: 0

## 2018-04-24 ASSESSMENT — PAIN DESCRIPTION - PROGRESSION
CLINICAL_PROGRESSION: NOT CHANGED
CLINICAL_PROGRESSION: GRADUALLY IMPROVING

## 2018-04-24 ASSESSMENT — PAIN DESCRIPTION - ONSET
ONSET: ON-GOING
ONSET: ON-GOING

## 2018-04-24 ASSESSMENT — PAIN DESCRIPTION - FREQUENCY
FREQUENCY: INTERMITTENT
FREQUENCY: CONTINUOUS

## 2018-04-24 ASSESSMENT — PAIN DESCRIPTION - LOCATION: LOCATION: GROIN

## 2018-04-24 ASSESSMENT — PAIN SCALES - GENERAL
PAINLEVEL_OUTOF10: 5
PAINLEVEL_OUTOF10: 4

## 2018-04-24 ASSESSMENT — PAIN DESCRIPTION - ORIENTATION
ORIENTATION: RIGHT
ORIENTATION: RIGHT

## 2018-04-24 ASSESSMENT — PAIN DESCRIPTION - PAIN TYPE
TYPE: ACUTE PAIN
TYPE: SURGICAL PAIN

## 2018-04-24 ASSESSMENT — PAIN SCALES - WONG BAKER: WONGBAKER_NUMERICALRESPONSE: 0

## 2018-11-15 ENCOUNTER — HOSPITAL ENCOUNTER (OUTPATIENT)
Age: 58
Setting detail: OBSERVATION
Discharge: HOME | End: 2018-11-15
Attending: EMERGENCY MEDICINE | Admitting: HOSPITALIST
Payer: COMMERCIAL

## 2018-11-15 ENCOUNTER — APPOINTMENT (OUTPATIENT)
Dept: GENERAL RADIOLOGY | Age: 58
End: 2018-11-15
Payer: COMMERCIAL

## 2018-11-15 VITALS
SYSTOLIC BLOOD PRESSURE: 121 MMHG | DIASTOLIC BLOOD PRESSURE: 70 MMHG | RESPIRATION RATE: 16 BRPM | BODY MASS INDEX: 32.43 KG/M2 | HEART RATE: 68 BPM | TEMPERATURE: 97.7 F | WEIGHT: 194.67 LBS | OXYGEN SATURATION: 92 % | HEIGHT: 65 IN

## 2018-11-15 DIAGNOSIS — R07.9 CHEST PAIN, UNSPECIFIED TYPE: Primary | ICD-10-CM

## 2018-11-15 PROBLEM — I25.119 CORONARY ARTERY DISEASE INVOLVING NATIVE CORONARY ARTERY OF NATIVE HEART WITH ANGINA PECTORIS (HCC): Status: ACTIVE | Noted: 2018-04-17

## 2018-11-15 LAB
A/G RATIO: 1.2 (ref 1.1–2.2)
ALBUMIN SERPL-MCNC: 3.6 G/DL (ref 3.4–5)
ALP BLD-CCNC: 126 U/L (ref 40–129)
ALT SERPL-CCNC: 11 U/L (ref 10–40)
ANION GAP SERPL CALCULATED.3IONS-SCNC: 10 MMOL/L (ref 3–16)
AST SERPL-CCNC: 18 U/L (ref 15–37)
BASOPHILS ABSOLUTE: 0 K/UL (ref 0–0.2)
BASOPHILS RELATIVE PERCENT: 0.4 %
BILIRUB SERPL-MCNC: <0.2 MG/DL (ref 0–1)
BILIRUBIN URINE: NEGATIVE
BLOOD, URINE: ABNORMAL
BUN BLDV-MCNC: 9 MG/DL (ref 7–20)
CALCIUM SERPL-MCNC: 8.9 MG/DL (ref 8.3–10.6)
CASTS: ABNORMAL /LPF
CHLORIDE BLD-SCNC: 99 MMOL/L (ref 99–110)
CLARITY: ABNORMAL
CO2: 28 MMOL/L (ref 21–32)
COLOR: YELLOW
COMMENT UA: ABNORMAL
CREAT SERPL-MCNC: 0.9 MG/DL (ref 0.6–1.1)
EOSINOPHILS ABSOLUTE: 0.1 K/UL (ref 0–0.6)
EOSINOPHILS RELATIVE PERCENT: 1.8 %
EPITHELIAL CELLS, UA: 6 /HPF (ref 0–5)
GFR AFRICAN AMERICAN: >60
GFR NON-AFRICAN AMERICAN: >60
GLOBULIN: 2.9 G/DL
GLUCOSE BLD-MCNC: 90 MG/DL (ref 70–99)
GLUCOSE URINE: NEGATIVE MG/DL
HCT VFR BLD CALC: 38.5 % (ref 36–48)
HEMOGLOBIN: 12.8 G/DL (ref 12–16)
KETONES, URINE: NEGATIVE MG/DL
LEUKOCYTE ESTERASE, URINE: NEGATIVE
LV EF: 65 %
LVEF MODALITY: NORMAL
LYMPHOCYTES ABSOLUTE: 2.8 K/UL (ref 1–5.1)
LYMPHOCYTES RELATIVE PERCENT: 35.8 %
MAGNESIUM: 2.2 MG/DL (ref 1.8–2.4)
MAGNESIUM: 2.5 MG/DL (ref 1.8–2.4)
MCH RBC QN AUTO: 31.9 PG (ref 26–34)
MCHC RBC AUTO-ENTMCNC: 33.4 G/DL (ref 31–36)
MCV RBC AUTO: 95.7 FL (ref 80–100)
MICROSCOPIC EXAMINATION: YES
MONOCYTES ABSOLUTE: 0.6 K/UL (ref 0–1.3)
MONOCYTES RELATIVE PERCENT: 7.9 %
NEUTROPHILS ABSOLUTE: 4.3 K/UL (ref 1.7–7.7)
NEUTROPHILS RELATIVE PERCENT: 54.1 %
NITRITE, URINE: NEGATIVE
PDW BLD-RTO: 13.9 % (ref 12.4–15.4)
PH UA: 5.5
PLATELET # BLD: 224 K/UL (ref 135–450)
PMV BLD AUTO: 10 FL (ref 5–10.5)
POTASSIUM REFLEX MAGNESIUM: 3.3 MMOL/L (ref 3.5–5.1)
PROTEIN UA: ABNORMAL MG/DL
RBC # BLD: 4.02 M/UL (ref 4–5.2)
RBC UA: 7 /HPF (ref 0–4)
SODIUM BLD-SCNC: 137 MMOL/L (ref 136–145)
SPECIFIC GRAVITY UA: 1.02
TOTAL PROTEIN: 6.5 G/DL (ref 6.4–8.2)
TROPONIN: <0.01 NG/ML
URINE REFLEX TO CULTURE: YES
URINE TYPE: ABNORMAL
UROBILINOGEN, URINE: 1 E.U./DL
WBC # BLD: 8 K/UL (ref 4–11)
WBC UA: 9 /HPF (ref 0–5)

## 2018-11-15 PROCEDURE — 83735 ASSAY OF MAGNESIUM: CPT

## 2018-11-15 PROCEDURE — 6370000000 HC RX 637 (ALT 250 FOR IP): Performed by: INTERNAL MEDICINE

## 2018-11-15 PROCEDURE — 81001 URINALYSIS AUTO W/SCOPE: CPT

## 2018-11-15 PROCEDURE — 6370000000 HC RX 637 (ALT 250 FOR IP): Performed by: NURSE PRACTITIONER

## 2018-11-15 PROCEDURE — A9502 TC99M TETROFOSMIN: HCPCS | Performed by: INTERNAL MEDICINE

## 2018-11-15 PROCEDURE — 96374 THER/PROPH/DIAG INJ IV PUSH: CPT

## 2018-11-15 PROCEDURE — G0378 HOSPITAL OBSERVATION PER HR: HCPCS

## 2018-11-15 PROCEDURE — 6360000002 HC RX W HCPCS: Performed by: INTERNAL MEDICINE

## 2018-11-15 PROCEDURE — 99285 EMERGENCY DEPT VISIT HI MDM: CPT

## 2018-11-15 PROCEDURE — 93017 CV STRESS TEST TRACING ONLY: CPT

## 2018-11-15 PROCEDURE — 96375 TX/PRO/DX INJ NEW DRUG ADDON: CPT

## 2018-11-15 PROCEDURE — 87086 URINE CULTURE/COLONY COUNT: CPT

## 2018-11-15 PROCEDURE — 6360000002 HC RX W HCPCS: Performed by: NURSE PRACTITIONER

## 2018-11-15 PROCEDURE — 78452 HT MUSCLE IMAGE SPECT MULT: CPT

## 2018-11-15 PROCEDURE — 93010 ELECTROCARDIOGRAM REPORT: CPT | Performed by: INTERNAL MEDICINE

## 2018-11-15 PROCEDURE — 99245 OFF/OP CONSLTJ NEW/EST HI 55: CPT | Performed by: INTERNAL MEDICINE

## 2018-11-15 PROCEDURE — 84484 ASSAY OF TROPONIN QUANT: CPT

## 2018-11-15 PROCEDURE — 36415 COLL VENOUS BLD VENIPUNCTURE: CPT

## 2018-11-15 PROCEDURE — 71046 X-RAY EXAM CHEST 2 VIEWS: CPT

## 2018-11-15 PROCEDURE — 3430000000 HC RX DIAGNOSTIC RADIOPHARMACEUTICAL: Performed by: INTERNAL MEDICINE

## 2018-11-15 PROCEDURE — 85025 COMPLETE CBC W/AUTO DIFF WBC: CPT

## 2018-11-15 PROCEDURE — 6370000000 HC RX 637 (ALT 250 FOR IP): Performed by: HOSPITALIST

## 2018-11-15 PROCEDURE — 94760 N-INVAS EAR/PLS OXIMETRY 1: CPT

## 2018-11-15 PROCEDURE — 80053 COMPREHEN METABOLIC PANEL: CPT

## 2018-11-15 PROCEDURE — 93005 ELECTROCARDIOGRAM TRACING: CPT | Performed by: NURSE PRACTITIONER

## 2018-11-15 RX ORDER — UBIDECARENONE 75 MG
50 CAPSULE ORAL DAILY
Status: DISCONTINUED | OUTPATIENT
Start: 2018-11-15 | End: 2018-11-15 | Stop reason: HOSPADM

## 2018-11-15 RX ORDER — CLOPIDOGREL BISULFATE 75 MG/1
75 TABLET ORAL DAILY
Status: DISCONTINUED | OUTPATIENT
Start: 2018-11-15 | End: 2018-11-15 | Stop reason: HOSPADM

## 2018-11-15 RX ORDER — ISOSORBIDE MONONITRATE 30 MG/1
30 TABLET, EXTENDED RELEASE ORAL DAILY
Status: DISCONTINUED | OUTPATIENT
Start: 2018-11-15 | End: 2018-11-15 | Stop reason: HOSPADM

## 2018-11-15 RX ORDER — ASPIRIN 81 MG/1
324 TABLET, CHEWABLE ORAL ONCE
Status: COMPLETED | OUTPATIENT
Start: 2018-11-15 | End: 2018-11-15

## 2018-11-15 RX ORDER — SODIUM CHLORIDE 0.9 % (FLUSH) 0.9 %
10 SYRINGE (ML) INJECTION PRN
Status: DISCONTINUED | OUTPATIENT
Start: 2018-11-15 | End: 2018-11-15 | Stop reason: HOSPADM

## 2018-11-15 RX ORDER — SODIUM CHLORIDE 0.9 % (FLUSH) 0.9 %
10 SYRINGE (ML) INJECTION EVERY 12 HOURS SCHEDULED
Status: DISCONTINUED | OUTPATIENT
Start: 2018-11-15 | End: 2018-11-15 | Stop reason: HOSPADM

## 2018-11-15 RX ORDER — ALPRAZOLAM 0.5 MG/1
0.5 TABLET ORAL NIGHTLY PRN
Status: DISCONTINUED | OUTPATIENT
Start: 2018-11-15 | End: 2018-11-15 | Stop reason: HOSPADM

## 2018-11-15 RX ORDER — METOPROLOL SUCCINATE 25 MG/1
25 TABLET, EXTENDED RELEASE ORAL DAILY
Status: DISCONTINUED | OUTPATIENT
Start: 2018-11-15 | End: 2018-11-15 | Stop reason: HOSPADM

## 2018-11-15 RX ORDER — POTASSIUM CHLORIDE 750 MG/1
20 TABLET, FILM COATED, EXTENDED RELEASE ORAL ONCE
Status: COMPLETED | OUTPATIENT
Start: 2018-11-15 | End: 2018-11-15

## 2018-11-15 RX ORDER — POTASSIUM CHLORIDE 1.5 G/1.77G
20 POWDER, FOR SOLUTION ORAL ONCE
Status: DISCONTINUED | OUTPATIENT
Start: 2018-11-15 | End: 2018-11-15 | Stop reason: SDUPTHER

## 2018-11-15 RX ORDER — ONDANSETRON 2 MG/ML
4 INJECTION INTRAMUSCULAR; INTRAVENOUS ONCE
Status: COMPLETED | OUTPATIENT
Start: 2018-11-15 | End: 2018-11-15

## 2018-11-15 RX ORDER — PANTOPRAZOLE SODIUM 40 MG/1
40 TABLET, DELAYED RELEASE ORAL
Status: DISCONTINUED | OUTPATIENT
Start: 2018-11-15 | End: 2018-11-15 | Stop reason: HOSPADM

## 2018-11-15 RX ORDER — MORPHINE SULFATE 2 MG/ML
2 INJECTION, SOLUTION INTRAMUSCULAR; INTRAVENOUS
Status: DISCONTINUED | OUTPATIENT
Start: 2018-11-15 | End: 2018-11-15 | Stop reason: HOSPADM

## 2018-11-15 RX ORDER — ATORVASTATIN CALCIUM 80 MG/1
80 TABLET, FILM COATED ORAL NIGHTLY
Status: DISCONTINUED | OUTPATIENT
Start: 2018-11-15 | End: 2018-11-15 | Stop reason: HOSPADM

## 2018-11-15 RX ORDER — MORPHINE SULFATE 2 MG/ML
2 INJECTION, SOLUTION INTRAMUSCULAR; INTRAVENOUS ONCE
Status: COMPLETED | OUTPATIENT
Start: 2018-11-15 | End: 2018-11-15

## 2018-11-15 RX ORDER — VENLAFAXINE HYDROCHLORIDE 75 MG/1
225 CAPSULE, EXTENDED RELEASE ORAL
Status: DISCONTINUED | OUTPATIENT
Start: 2018-11-15 | End: 2018-11-15 | Stop reason: HOSPADM

## 2018-11-15 RX ORDER — NITROGLYCERIN 0.4 MG/1
0.4 TABLET SUBLINGUAL EVERY 5 MIN PRN
Status: DISCONTINUED | OUTPATIENT
Start: 2018-11-15 | End: 2018-11-15 | Stop reason: HOSPADM

## 2018-11-15 RX ORDER — LOSARTAN POTASSIUM 100 MG/1
100 TABLET ORAL DAILY
COMMUNITY

## 2018-11-15 RX ORDER — UBIDECARENONE 75 MG
50 CAPSULE ORAL DAILY
COMMUNITY

## 2018-11-15 RX ORDER — LOSARTAN POTASSIUM 50 MG/1
100 TABLET ORAL DAILY
Status: DISCONTINUED | OUTPATIENT
Start: 2018-11-15 | End: 2018-11-15 | Stop reason: HOSPADM

## 2018-11-15 RX ORDER — ASPIRIN 81 MG/1
81 TABLET, CHEWABLE ORAL DAILY
Status: DISCONTINUED | OUTPATIENT
Start: 2018-11-16 | End: 2018-11-15 | Stop reason: HOSPADM

## 2018-11-15 RX ORDER — ACETAMINOPHEN 500 MG
1000 TABLET ORAL ONCE
Status: COMPLETED | OUTPATIENT
Start: 2018-11-15 | End: 2018-11-15

## 2018-11-15 RX ORDER — ONDANSETRON 2 MG/ML
4 INJECTION INTRAMUSCULAR; INTRAVENOUS EVERY 6 HOURS PRN
Status: DISCONTINUED | OUTPATIENT
Start: 2018-11-15 | End: 2018-11-15 | Stop reason: HOSPADM

## 2018-11-15 RX ADMIN — ACETAMINOPHEN 1000 MG: 500 TABLET ORAL at 00:46

## 2018-11-15 RX ADMIN — LIDOCAINE HYDROCHLORIDE: 20 SOLUTION ORAL; TOPICAL at 00:46

## 2018-11-15 RX ADMIN — VITAMIN B12 0.1 MG ORAL TABLET 50 MCG: 0.1 TABLET ORAL at 12:33

## 2018-11-15 RX ADMIN — PANTOPRAZOLE SODIUM 40 MG: 40 TABLET, DELAYED RELEASE ORAL at 12:33

## 2018-11-15 RX ADMIN — POTASSIUM CHLORIDE 20 MEQ: 750 TABLET, FILM COATED, EXTENDED RELEASE ORAL at 12:39

## 2018-11-15 RX ADMIN — ISOSORBIDE MONONITRATE 30 MG: 30 TABLET, EXTENDED RELEASE ORAL at 12:34

## 2018-11-15 RX ADMIN — LOSARTAN POTASSIUM 100 MG: 50 TABLET ORAL at 12:34

## 2018-11-15 RX ADMIN — MORPHINE SULFATE 2 MG: 2 INJECTION, SOLUTION INTRAMUSCULAR; INTRAVENOUS at 01:51

## 2018-11-15 RX ADMIN — POTASSIUM CHLORIDE 20 MEQ: 750 TABLET, EXTENDED RELEASE ORAL at 01:44

## 2018-11-15 RX ADMIN — TETROFOSMIN 30 MILLICURIE: 0.23 INJECTION, POWDER, LYOPHILIZED, FOR SOLUTION INTRAVENOUS at 10:13

## 2018-11-15 RX ADMIN — VENLAFAXINE HYDROCHLORIDE 225 MG: 75 CAPSULE, EXTENDED RELEASE ORAL at 12:33

## 2018-11-15 RX ADMIN — TETROFOSMIN 10 MILLICURIE: 0.23 INJECTION, POWDER, LYOPHILIZED, FOR SOLUTION INTRAVENOUS at 09:02

## 2018-11-15 RX ADMIN — NITROGLYCERIN 0.5 INCH: 20 OINTMENT TOPICAL at 00:49

## 2018-11-15 RX ADMIN — CLOPIDOGREL BISULFATE 75 MG: 75 TABLET ORAL at 12:33

## 2018-11-15 RX ADMIN — METOPROLOL SUCCINATE 25 MG: 25 TABLET, EXTENDED RELEASE ORAL at 12:34

## 2018-11-15 RX ADMIN — REGADENOSON 0.4 MG: 0.08 INJECTION, SOLUTION INTRAVENOUS at 10:08

## 2018-11-15 RX ADMIN — ONDANSETRON 4 MG: 2 INJECTION INTRAMUSCULAR; INTRAVENOUS at 01:47

## 2018-11-15 RX ADMIN — ASPIRIN 81 MG 324 MG: 81 TABLET ORAL at 00:45

## 2018-11-15 ASSESSMENT — PAIN SCALES - GENERAL
PAINLEVEL_OUTOF10: 0
PAINLEVEL_OUTOF10: 5
PAINLEVEL_OUTOF10: 5
PAINLEVEL_OUTOF10: 0
PAINLEVEL_OUTOF10: 7
PAINLEVEL_OUTOF10: 0
PAINLEVEL_OUTOF10: 0
PAINLEVEL_OUTOF10: 5
PAINLEVEL_OUTOF10: 0

## 2018-11-15 ASSESSMENT — ENCOUNTER SYMPTOMS
NAUSEA: 1
CONSTIPATION: 0
ANAL BLEEDING: 0
VOMITING: 1
DIARRHEA: 0
BLOOD IN STOOL: 0
SHORTNESS OF BREATH: 1
ABDOMINAL PAIN: 1
CHEST TIGHTNESS: 1
WHEEZING: 0
RECTAL PAIN: 0
COUGH: 0
STRIDOR: 0
ABDOMINAL DISTENTION: 0

## 2018-11-15 ASSESSMENT — PAIN DESCRIPTION - DESCRIPTORS: DESCRIPTORS_2: BURNING;DISCOMFORT

## 2018-11-15 ASSESSMENT — PAIN DESCRIPTION - INTENSITY: RATING_2: 6

## 2018-11-15 ASSESSMENT — PAIN DESCRIPTION - LOCATION: LOCATION_2: ABDOMEN

## 2018-11-15 ASSESSMENT — HEART SCORE: ECG: 1

## 2018-11-15 NOTE — ED PROVIDER NOTES
I independently performed a history and physical on [employer]. All diagnostic, treatment, and disposition decisions were made by myself in conjunction with the advanced practice provider. Briefly, this is a 60 y.o. female here for chest pain. Patient has significant history of coronary artery disease, and recently had stenting done in April. Patient presents today with worsening chest pain, and was noted to have a subcritical lesion on her last catheterization that they would look at later. On exam, patient has benign physical examination, an EKG was normal.  Patient's lab testing is normal.  Patient did take nitro) at home, to help make the pain better. Urinalysis stable angina, and I do feel the patient's going to require admission to the hospital for further evaluation due to the fact that she has a significant past medical history for coronary artery disease    For further details of [facility] emergency department encounter, please see documentation by advanced practice provider  Anita Latham MD  11/15/18 5667

## 2018-11-15 NOTE — ED PROVIDER NOTES
[facility]  eMERGENCY dEPARTMENT eNCOUnter        Pt Name: Stella Rosas  MRN: [id number]  [place] 1960  Date of evaluation: 11/15/2018  Provider: ANABELLA Hernández - CNP  PCP: Ron Johnson MD    [address] [place] [address]       Chief Complaint   Patient presents with    Chest Pain     Pt to ED via [place] [facility] with c/o mid sternal chest pain associated with n/v and sob that started 20 min prior to ED arrival.  Pt states she took 1 dose of nitro SL when pain began with slight relief. States had stent placement this past spring. HISTORY OF PRESENT ILLNESS   (Location/Symptom, Timing/Onset, Context/Setting, Quality, Duration, Modifying Factors, Severity)  Note limiting factors. Stella Rosas is a 60 y.o. female the ED today after experiencing earlier this evening some chest pressure with associated shortness of breath. Patient states that she was at home earlier this evening when she noticed some chest pressure and shortness of breath start. She says that she had some accompanying chart epigastric pain with diaphoresis and an episode of nausea and vomiting. Patient had nitroglycerin at home and took one dose and then called the squad. Patient states that prior to the dose of nitroglycerin her pain was roughly an 8 out of 10. Patient states that she is now pain-free however feels as if she has a little bit of chest pressure still. Patient states she has no back pain, no numbness, no tingling, no abdominal pain, no fever no chills. Patient has no GI/ associated signs or symptoms. Patient states that she just moved to the [facility] from [place] and has unable to establish a cardiologist in [place]. Patient states that she had a history of a stent placed in April and this episode of chest pressure feels similar to that which she experienced prior to her sent in April.   Patient states that she also had a recent abdominal surgery of a (XANAX) 0.5 MG TABLET    Take 0.5 mg by mouth nightly as needed for Sleep. ASPIRIN 81 MG TABLET    Take 81 mg by mouth daily. ATORVASTATIN (LIPITOR) 80 MG TABLET    Take 1 tablet by mouth nightly    CLOPIDOGREL (PLAVIX) 75 MG TABLET    Take 1 tablet by mouth daily    ISOSORBIDE MONONITRATE (IMDUR) 30 MG EXTENDED RELEASE TABLET    Take 30 mg by mouth daily    LISINOPRIL (PRINIVIL;ZESTRIL) 5 MG TABLET    Take 1 tablet by mouth daily    LOSARTAN (COZAAR) 100 MG TABLET    Take 100 mg by mouth daily    METOPROLOL SUCCINATE (TOPROL XL) 25 MG EXTENDED RELEASE TABLET    Take 25 mg by mouth daily    NITROGLYCERIN (NITROSTAT) 0.4 MG SL TABLET    up to max of 3 total doses. If no relief after 1 dose, call 911.     ONDANSETRON (ZOFRAN) 4 MG TABLET    Take every six hours as needed    PANTOPRAZOLE (PROTONIX) 40 MG TABLET    Take 1 tablet by mouth daily    VENLAFAXINE 225 MG EXTENDED RELEASE TABLET    Take 225 mg by mouth daily (with breakfast)    VITAMIN B-12 (CYANOCOBALAMIN) 100 MCG TABLET    Take 50 mcg by mouth daily    VITAMIN D (ERGOCALCIFEROL) 66364 UNITS CAPS CAPSULE    Take 50,000 Units by mouth once a week         ALLERGIES     Banana and Hydrocodone-acetaminophen    FAMILYHISTORY       Family History   Problem Relation Age of Onset    Cancer Father           SOCIAL HISTORY       Social History     Social History    Marital status:      Spouse name: N/A    Number of children: N/A    Years of education: N/A     Social History Main Topics    Smoking status: Current Every Day Smoker     Packs/day: 1.00     Years: 40.00     Types: Cigarettes    Smokeless tobacco: Never Used      Comment: declined nicotine patch    Alcohol use 0.6 oz/week     1 Cans of beer per week      Comment: twice a year    Drug use: No    Sexual activity: Not Asked     Other Topics Concern    None     Social History Narrative    None       SCREENINGS    Scott Coma Scale  Eye Opening: Spontaneous  Best Verbal Response: Oriented  Best Motor Response: Obeys commands  Scott Coma Scale Score: 15 Heart Score for chest pain patients  History: Highly Suspicious  ECG: Non-Specifc repolarization disturbance/LBTB/PM  Patient Age: > 39 and < 65 years  *Risk factors for Atherosclerotic disease: Hypercholesterolemia, Hypertension, Coronary Artery Disease  Risk Factors: > 3 Risk factors or history of atherosclerotic disease*  Troponin: < 1X normal limit  Heart Score Total: 6      PHYSICAL EXAM    (up to 7 for level 4, 8 or more for level 5)     ED Triage Vitals [11/15/18 0010]   BP Temp Temp Source Pulse Resp SpO2 Height Weight   (!) 153/90 98.2 °F (36.8 °C) Oral 68 18 96 % 5' 5\" (1.651 m) 194 lb 10.7 oz (88.3 kg)       Physical Exam   Constitutional: She is oriented to person, place, and time. She appears well-developed and well-nourished. Non-toxic appearance. She does not have a sickly appearance. She does not appear ill. No distress. She is not intubated. HENT:   Head: Normocephalic and atraumatic. Nose: Nose normal.   Eyes: Pupils are equal, round, and reactive to light. Conjunctivae are normal. Right eye exhibits no discharge. Left eye exhibits no discharge. Neck: Trachea normal. Normal carotid pulses and no JVD present. Carotid bruit is not present. No tracheal deviation present. Cardiovascular: Normal rate, regular rhythm, normal heart sounds, intact distal pulses and normal pulses. PMI is not displaced. Exam reveals no gallop and no friction rub. No murmur heard. Pulmonary/Chest: Effort normal and breath sounds normal. No accessory muscle usage or stridor. No apnea, no tachypnea and no bradypnea. She is not intubated. No respiratory distress. She has no decreased breath sounds. She has no wheezes. She has no rhonchi. She has no rales. She exhibits no mass, no tenderness, no laceration, no crepitus, no edema, no deformity, no swelling and no retraction. Abdominal: Soft.  Normal appearance and bowel sounds are normal. pneumonia, esophagitis, pericarditis, endocarditis, GERD, musculoskeletal.    EKG was interpreted per ED attending Dr. Nahun Barrios and reviewed per myself. EKG findings of normal sinus rhythm with sinus arrhythmia. Nonspecific ST and T-wave abnormalities seen. No ST elevation seen in any contiguous leads. Ventricular rate of 68. PA interval of 180. QRS duration 90. QT/QTc 380/404. CBC results: CBC unremarkable. No leukocytosis, no shift to the left, patient not acutely anemic. CMP results: Potassium 3.3 for which patient got 20 mEq orally to replace. Magnesium 2.2. Otherwise unremarkable CMP. Initial troponin less than 0.01.    UA results: Sclerae clear. Moderate blood, trace protein. Otherwise unremarkable. Microscopic urinalysis was obtained. 0-1 hyaline casts, 9 WBC, 7 RBC, 6 episodes. Patient is not experiencing any dysuria, marina hematuria, flank, urinary frequency or urgency. Chest x-ray as read by the radiologist:    Given these results heart score of 6 and the patient's initial presentation with chest pain resolving with nitroglycerin and history of CAD, hypertension, hyperlipidemia, and stent placement in April I do believe that it is necessary to admit patient for serial troponins and cardiology follow-up especially since patient does not have close cardiac follow-up within the [facility]. All test results and plan discussed with patient who agrees to be admitted. In collaboration with ED attending as well as supervisory nurse practitioner Albert Flanagan who agreed that patient should be admitted for serial enzymes and cardiac referral.  I will consult the hospitalist in anticipation of admission. 3169: Consulted and spoke with hospitalist regarding pt adm. Hospitalist agrees to accept patient and write orders for admission. FINAL IMPRESSION      1.  Chest pain, unspecified type          DISPOSITION/PLAN   DISPOSITION        PATIENT REFERREDTO:  No follow-up provider

## 2018-11-15 NOTE — ED PROVIDER NOTES
heart catheter, and drug-eluting stent placement. She had a stress test available for review as well. HEART score: 6  EKG negative for evidence of STEMI or ischemia. Chest x-ray is negative for evidence of atelectasis pneumonia or effusion. No widening mediastinum. CBC and chemistry profile unremarkable. Troponin less than 0.01.    0223:  Jorje Adan has consult it with Dr. Nadia Pro for admission. He has accepted patient. Disposition was discussed with the patient,and / or family. All questions were answered and they were in agreement with the plan. I am Training ALEENA for this patient encounter and I agree with the orientee and their findings and treatment plan. I independently interviewed and examined the patient. My findings are recorded in the note above. I am the provider of record for the note above. This chart was generated using the [address] [place] [address] dictation system. I created this record but it may contain dictation errors given the limitations of this technology.     THE PATIENT WAS DISPOSITIONED BY ME.      Javon Gilmore, ANABELLA - CNP  11/15/18 3208

## 2018-11-15 NOTE — PROGRESS NOTES
Pt resting in bed, md in room talking with pt, resp e/e, no s/s distress, denies pain at this time, call light in reach.   Electronically signed by Charla Sarmiento RN on 11/15/2018 at 8:33 AM

## 2018-11-15 NOTE — H&P
Hospital Medicine History & Physical      PCP: Kunal Rojo MD    Date of Admission: 11/15/2018    Date of Service: Pt seen/examined on 11/15/2018 and  Placed in Observation. Chief Complaint:   Chest pain      History Of Present Illness:      60 y.o. female with a past medical history of essential hypertension, chronic systolic heart failure with EF 3540%, coronary artery disease status post PCI/BETY to D1 4/16/18, tobacco abuse presented to emergency room with chest pain. The patient started having substernal chest pain associated shortness of breath, diaphoresis nausea and vomiting this evening. She also had some epigastric pain. . Pain was relieved by nitroglycerin. She has been compliant with her medications but continues to smoke daily      EKG showed nonspecific ST abnormalities with no acute changes, troponin was negative. Chest x-ray was clear    Past Medical History:          Diagnosis Date    Arthritis     hips and ankles, neck, lower back    Asthma     CAD in native artery 4/17/2018    Hypertension        Past Surgical History:          Procedure Laterality Date    APPENDECTOMY      CORONARY ANGIOPLASTY WITH STENT PLACEMENT Left 04/2018    LIPOMA RESECTION Right     [place] LAP,CHOLECYSTECTOMY N/A 2/24/2018    CHOLECYSTECTOMY LAPAROSCOPIC ROBOTIC performed by Ayla Silva MD at [address]       Medications Prior to Admission:      Prior to Admission medications    Medication Sig Start Date End Date Taking?  Authorizing Provider   losartan (COZAAR) 100 MG tablet Take 100 mg by mouth daily   Yes Historical Provider, MD   vitamin B-12 (CYANOCOBALAMIN) 100 MCG tablet Take 50 mcg by mouth daily   Yes Historical Provider, MD   atorvastatin (LIPITOR) 80 MG tablet Take 1 tablet by mouth nightly 4/17/18  Yes ANABELLA Velez CNP   clopidogrel (PLAVIX) 75 MG tablet Take 1 tablet by mouth daily 4/18/18  Yes ANABELLA Velez CNP   isosorbide mononitrate (IMDUR)

## 2018-11-15 NOTE — PROGRESS NOTES
Pt arrived unit on stretcher at 0330 am. A&O 4x. Ambulates independently to and room bathroom. NPO now until discontinued. No skin breakdown. Multiple scattered bruises. Patient's meds in cabinet. Has glasses on. On Tele NSR 62. No signs of edema. No c/o pain at this time. Call light within reach, bed in lowest position and locked.  No signs of distress, will continue to monitor

## 2018-11-15 NOTE — ED NOTES
Pt ambulatory to and from restroom without difficulty with slow steady gait.       Hollie Lemos RN  11/15/18 9351

## 2018-11-16 LAB
EKG ATRIAL RATE: 68 BPM
EKG DIAGNOSIS: NORMAL
EKG P AXIS: 63 DEGREES
EKG P-R INTERVAL: 180 MS
EKG Q-T INTERVAL: 380 MS
EKG QRS DURATION: 90 MS
EKG QTC CALCULATION (BAZETT): 404 MS
EKG R AXIS: 72 DEGREES
EKG T AXIS: 88 DEGREES
EKG VENTRICULAR RATE: 68 BPM
URINE CULTURE, ROUTINE: NORMAL

## 2018-11-16 NOTE — CONSULTS
[address]                                  CONSULTATION    PATIENT NAME: Kamari Alvarez                    :        1960  MED REC NO:   [id number]                          ROOM:       46  ACCOUNT NO:   [de-identified]                           ADMIT DATE: 11/15/2018  PROVIDER:     Ovidio Stone MD    CARDIOLOGY CONSULTATION    CONSULT DATE:  11/15/2018    HISTORY OF PRESENT ILLNESS:  This is a 79-year-old female with a known  history of coronary artery disease with PCI of the diagonal branch in  , LV systolic dysfunction, hypertension, presented to the  hospital with some diaphoresis, nausea, and vomiting. This occurred the  night before her admission. She has some associated epigastric pain. She states her pain was different than the pain she had experienced  prior to her angioplasty in 2018. She has not been experiencing  similar pain with the physical exertion. She presented to the emergency  room and due to her previous cardiac history, was admitted for further  evaluation and treatment. Her troponins have been negative. REVIEW OF SYSTEMS:  Please see HPI. All other systems are reviewed and  they are negative. PAST MEDICAL HISTORY:  1. History of coronary artery disease status post drug-eluting stent  placement in the diagonal branch in 2018. 2.  Cardiomyopathy with left ventricular ejection fraction of about 35%  to 40%. No previous history of CHF. 3.  Hypertension. 4.  Arthritis. PAST SURGICAL HISTORY:  The patient had appendectomy, angioplasty and  stent as mentioned, cholecystectomy recently. SOCIAL HISTORY:  She still smokes about half pack per day and drinks  about 0.6 ounce of alcohol per week. FAMILY HISTORY:  Negative for early premature atherosclerosis. MEDICINES AND ALLERGIES:  Have been reviewed.     PHYSICAL EXAMINATION:  CONSTITUTIONAL:  Reveals

## 2022-07-06 ENCOUNTER — HOSPITAL ENCOUNTER (EMERGENCY)
Age: 62
Discharge: HOME | End: 2022-07-06
Payer: COMMERCIAL

## 2022-07-06 ENCOUNTER — APPOINTMENT (OUTPATIENT)
Dept: GENERAL RADIOLOGY | Age: 62
End: 2022-07-06
Payer: COMMERCIAL

## 2022-07-06 VITALS
HEART RATE: 72 BPM | DIASTOLIC BLOOD PRESSURE: 74 MMHG | WEIGHT: 194.45 LBS | TEMPERATURE: 98.7 F | BODY MASS INDEX: 32.4 KG/M2 | HEIGHT: 65 IN | RESPIRATION RATE: 16 BRPM | OXYGEN SATURATION: 100 % | SYSTOLIC BLOOD PRESSURE: 120 MMHG

## 2022-07-06 DIAGNOSIS — S60.221A CONTUSION OF MULTIPLE SITES OF RIGHT HAND AND WRIST, INITIAL ENCOUNTER: ICD-10-CM
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
## 2022-07-06 DIAGNOSIS — S80.212A KNEE ABRASION, LEFT, INITIAL ENCOUNTER: ICD-10-CM

## 2022-07-06 DIAGNOSIS — Z78.9 UP TO DATE WITH TETANUS TOXOID IMMUNIZATION: ICD-10-CM

## 2022-07-06 DIAGNOSIS — S89.92XA KNEE INJURY, LEFT, INITIAL ENCOUNTER: Primary | ICD-10-CM

## 2022-07-06 DIAGNOSIS — S60.211A CONTUSION OF MULTIPLE SITES OF RIGHT HAND AND WRIST, INITIAL ENCOUNTER: ICD-10-CM

## 2022-07-06 LAB
BACTERIA: ABNORMAL /HPF
BILIRUBIN URINE: ABNORMAL
BLOOD, URINE: ABNORMAL
CLARITY: ABNORMAL
COLOR: ABNORMAL
EPITHELIAL CELLS, UA: 25 /HPF (ref 0–5)
GLUCOSE URINE: NEGATIVE MG/DL
HYALINE CASTS: 38 /LPF (ref 0–8)
HYALINE CASTS: PRESENT
KETONES, URINE: ABNORMAL MG/DL
LEUKOCYTE ESTERASE, URINE: ABNORMAL
MICROSCOPIC EXAMINATION: YES
NITRITE, URINE: NEGATIVE
PH UA: 5 (ref 5–8)
PROTEIN UA: 30 MG/DL
RBC UA: 5 /HPF (ref 0–4)
SPECIFIC GRAVITY UA: 1.02 (ref 1–1.03)
URINE REFLEX TO CULTURE: ABNORMAL
URINE TYPE: ABNORMAL
UROBILINOGEN, URINE: 1 E.U./DL
WBC UA: 6 /HPF (ref 0–5)

## 2022-07-06 PROCEDURE — 73090 X-RAY EXAM OF FOREARM: CPT

## 2022-07-06 PROCEDURE — 71046 X-RAY EXAM CHEST 2 VIEWS: CPT

## 2022-07-06 PROCEDURE — 81001 URINALYSIS AUTO W/SCOPE: CPT

## 2022-07-06 PROCEDURE — 6370000000 HC RX 637 (ALT 250 FOR IP): Performed by: NURSE PRACTITIONER

## 2022-07-06 PROCEDURE — 99284 EMERGENCY DEPT VISIT MOD MDM: CPT

## 2022-07-06 PROCEDURE — 73562 X-RAY EXAM OF KNEE 3: CPT

## 2022-07-06 PROCEDURE — 73130 X-RAY EXAM OF HAND: CPT

## 2022-07-06 RX ORDER — BACITRACIN, NEOMYCIN, POLYMYXIN B 400; 3.5; 5 [USP'U]/G; MG/G; [USP'U]/G
OINTMENT TOPICAL ONCE
Status: COMPLETED | OUTPATIENT
Start: 2022-07-06 | End: 2022-07-06

## 2022-07-06 RX ORDER — IBUPROFEN 600 MG/1
600 TABLET ORAL 3 TIMES DAILY PRN
Qty: 15 TABLET | Refills: 0 | Status: SHIPPED | OUTPATIENT
Start: 2022-07-06 | End: 2022-07-06

## 2022-07-06 RX ORDER — IBUPROFEN 400 MG/1
800 TABLET ORAL ONCE
Status: DISCONTINUED | OUTPATIENT
Start: 2022-07-06 | End: 2022-07-06 | Stop reason: HOSPADM

## 2022-07-06 RX ORDER — ACETAMINOPHEN 500 MG
500 TABLET ORAL 4 TIMES DAILY PRN
Qty: 28 TABLET | Refills: 0 | Status: SHIPPED | OUTPATIENT
Start: 2022-07-06 | End: 2022-07-06

## 2022-07-06 RX ORDER — ACETAMINOPHEN 500 MG
1000 TABLET ORAL ONCE
Status: DISCONTINUED | OUTPATIENT
Start: 2022-07-06 | End: 2022-07-06 | Stop reason: HOSPADM

## 2022-07-06 RX ADMIN — BACITRACIN ZINC, NEOMYCIN, POLYMYXIN B SULFAT: 5000; 3.5; 4 OINTMENT TOPICAL at 17:39

## 2022-07-06 ASSESSMENT — PAIN DESCRIPTION - PAIN TYPE: TYPE: ACUTE PAIN

## 2022-07-06 ASSESSMENT — PAIN DESCRIPTION - ORIENTATION: ORIENTATION: LEFT

## 2022-07-06 ASSESSMENT — PAIN SCALES - GENERAL
PAINLEVEL_OUTOF10: 9
PAINLEVEL_OUTOF10: 7
PAINLEVEL_OUTOF10: 5

## 2022-07-06 ASSESSMENT — PAIN - FUNCTIONAL ASSESSMENT: PAIN_FUNCTIONAL_ASSESSMENT: 0-10

## 2022-07-06 ASSESSMENT — PAIN DESCRIPTION - LOCATION: LOCATION: KNEE

## 2022-07-06 ASSESSMENT — PAIN DESCRIPTION - DESCRIPTORS: DESCRIPTORS: DISCOMFORT

## 2022-07-06 NOTE — ED PROVIDER NOTES
the ED)  Current Outpatient Rx   Medication Sig Dispense Refill    ibuprofen (ADVIL;MOTRIN) 600 MG tablet Take 1 tablet by mouth 3 times daily as needed for Pain With meals 15 tablet 0    acetaminophen (TYLENOL) 500 MG tablet Take 1 tablet by mouth 4 times daily as needed for Pain 28 tablet 0    losartan (COZAAR) 100 MG tablet Take 100 mg by mouth daily      vitamin B-12 (CYANOCOBALAMIN) 100 MCG tablet Take 50 mcg by mouth daily      atorvastatin (LIPITOR) 80 MG tablet Take 1 tablet by mouth nightly 30 tablet 3    clopidogrel (PLAVIX) 75 MG tablet Take 1 tablet by mouth daily 30 tablet 3    isosorbide mononitrate (IMDUR) 30 MG extended release tablet Take 30 mg by mouth daily      metoprolol succinate (TOPROL XL) 25 MG extended release tablet Take 25 mg by mouth daily      nitroGLYCERIN (NITROSTAT) 0.4 MG SL tablet up to max of 3 total doses. If no relief after 1 dose, call 911. 25 tablet 3    pantoprazole (PROTONIX) 40 MG tablet Take 1 tablet by mouth daily 30 tablet 3    ALPRAZolam (XANAX) 0.5 MG tablet Take 0.5 mg by mouth nightly as needed for Sleep.  vitamin D (ERGOCALCIFEROL) 88762 units CAPS capsule Take 50,000 Units by mouth once a week      venlafaxine 225 MG extended release tablet Take 225 mg by mouth daily (with breakfast)      aspirin 81 MG tablet Take 81 mg by mouth daily.          ALLERGIES    Allergies   Allergen Reactions    Banana Hives    Hydrocodone-Acetaminophen Nausea And Vomiting     Other reaction(s): Nausea And Vomiting       SOCIAL & FAMILY HISTORY    Social History     Socioeconomic History    Marital status:      Spouse name: None    Number of children: None    Years of education: None    Highest education level: None   Occupational History    None   Tobacco Use    Smoking status: Current Every Day Smoker     Packs/day: 1.00     Years: 40.00     Pack years: 40.00     Types: Cigarettes    Smokeless tobacco: Never Used    Tobacco comment: declined nicotine patch   Vaping Use    Vaping Use: Never used   Substance and Sexual Activity    Alcohol use: Yes     Alcohol/week: 1.0 standard drink     Types: 1 Cans of beer per week     Comment: twice a year    Drug use: No    Sexual activity: None   Other Topics Concern    None   Social History Narrative    None     Social Determinants of Health     Financial Resource Strain:     Difficulty of Paying Living Expenses: Not on file   Food Insecurity:     Worried About Running Out of Food in the Last Year: Not on file    [place] of Food in the Last Year: Not on file   Transportation Needs:     Lack of Transportation (Medical): Not on file    Lack of Transportation (Non-Medical):  Not on file   Physical Activity:     Days of Exercise per Week: Not on file    Minutes of Exercise per Session: Not on file   Stress:     Feeling of Stress : Not on file   Social Connections:     Frequency of Communication with Friends and Family: Not on file    Frequency of Social Gatherings with Friends and Family: Not on file    Attends Mandaen Services: Not on file    Active Member of [address] [place] [address] or Organizations: Not on file    Attends Club or Organization Meetings: Not on file    Marital Status: Not on file   Intimate Partner Violence:     Fear of Current or Ex-Partner: Not on file    Emotionally Abused: Not on file    Physically Abused: Not on file    Sexually Abused: Not on file   Housing Stability:     Unable to Pay for Housing in the Last Year: Not on file    Number of [place] in the Last Year: Not on file    Unstable Housing in the Last Year: Not on file     Family History   Problem Relation Age of Onset    Cancer Father        PHYSICAL EXAM    VITAL SIGNS: /77   Pulse 69   Temp 98.7 °F (37.1 °C) (Oral)   Resp 18   Ht 5' 5\" (1.651 m)   Wt 194 lb 7.1 oz (88.2 kg)   SpO2 99%   BMI 32.36 kg/m²    Constitutional:  Well developed, well nourished, no acute distress   HENT:  traumatic, no trismus, no hemotympanum  Neck: supple, no JVD, there is no bony midline posterior neck tenderness.  + Full ROM. Respiratory:  Lungs clear to auscultation bilaterally, no retractions   Cardiovascular:  regular rate, no murmurs, rubs, or gallops.  + S1-S2  GI:  Soft, nontender, normal bowel sounds  Musculoskeletal:  No edema, + tenderness to palpation of the anterior left knee, mid anterior chest wall, lateral left wrist, and mid left hand. No left snuffbox tenderness. Vascular: Radial and DP/PT pulses 2+ with brisk cap refill <2 seconds. Integument:  Well hydrated, + abrasion noted to the anterior left knee.  + Ecchymosis noted to the palmar surface of the right hand. Neurologic:  Alert & oriented, no slurred speech  Psych: Pleasant affect, no hallucinations            RADIOLOGY  (interpreted by the radiologist)  XR CHEST (2 VW)    Result Date: 7/6/2022  EXAMINATION: TWO XRAY VIEWS OF THE CHEST 7/6/2022 4:58 pm COMPARISON: 11/15/2018 HISTORY: ORDERING SYSTEM PROVIDED HISTORY: r/o rib frx s/p struck chest on stair TECHNOLOGIST PROVIDED HISTORY: Reason for exam:->r/o rib frx s/p struck chest on stair Reason for Exam: r/o rib frx s/p struck chest on stair FINDINGS: The cardiomediastinal silhouette is normal in size and contour. The lungs are clear. No pleural effusion or pneumothorax is present. Degenerate change. No acute cardiopulmonary process     XR RADIUS ULNA RIGHT (2 VIEWS)    Result Date: 7/6/2022  EXAMINATION: TWO XRAY VIEWS OF THE RIGHT FOREARM; THREE XRAY VIEWS OF THE RIGHT HAND 7/6/2022 4:58 pm COMPARISON: None. HISTORY: ORDERING SYSTEM PROVIDED HISTORY: r/o frx/dislocation TECHNOLOGIST PROVIDED HISTORY: Reason for exam:->r/o frx/dislocation Reason for Exam: trauma fall FINDINGS: No fracture, dislocation, or focal osseous lesion is noted. Mild degenerate change changes. no significant soft tissue abnormality seen. No fracture or dislocation.      XR HAND RIGHT (MIN 3 VIEWS)    Result Date: 7/6/2022  EXAMINATION: TWO XRAY VIEWS OF THE RIGHT FOREARM; THREE XRAY VIEWS OF THE RIGHT HAND 7/6/2022 4:58 pm COMPARISON: None. HISTORY: ORDERING SYSTEM PROVIDED HISTORY: r/o frx/dislocation TECHNOLOGIST PROVIDED HISTORY: Reason for exam:->r/o frx/dislocation Reason for Exam: trauma fall FINDINGS: No fracture, dislocation, or focal osseous lesion is noted. Mild degenerate change changes. no significant soft tissue abnormality seen. No fracture or dislocation. XR KNEE LEFT (3 VIEWS)    Result Date: 7/6/2022  EXAMINATION: THREE XRAY VIEWS OF THE LEFT KNEE 7/6/2022 4:55 pm COMPARISON: None. HISTORY: ORDERING SYSTEM PROVIDED HISTORY: pt fell on steps going up then landing on left knee. abrasion noted. TECHNOLOGIST PROVIDED HISTORY: Reason for exam:->pt fell on steps going up then landing on left knee. abrasion noted. Reason for Exam: pt fell on steps going up then landing on left knee. abrasion noted. FINDINGS: No acute fracture or dislocation. Joint spaces are preserved with mild osteoarthritic spurring. There is no joint effusion. Distal SFA and popliteal vascular calcification vascular calcification is noted. No acute osseous abnormality the left knee. Mild osteoarthritic changes. ED COURSE & MEDICAL DECISION MAKING    Pertinent studies reviewed and interpreted. (See chart for details)  See chart for details of medications ordered    Differential Diagnosis: Fracture, Dislocation, ligamentous injury, other soft tissue injury, visceral injury, neurologic injury, other. Patient presents emergency department status post she experienced a mechanical trip and fall attempted to negative for step on a staircase. She fell forward and struck her mid anterior chest wall, anterior left knee, and attempt to catch herself with her right hand and injured her hand and wrist on the right laterality. No head injury, loss of consciousness, or blood thinner use.   Will obtain imaging of the after mentioned areas via CXR right radius and ulna, XR right hand, CXR, and XR left knee. Work-up pending. Patient declines ibuprofen and Tylenol. The left knee wound was cleansed with Hibiclens and normal saline, sterile dressing and Ace wrap applied to the left knee. Patient is afebrile and nontoxic in appearance. Plain films as above. No evidence of neurovascular injury on exam. X-rays of left knee, CXR, right hand, right ulna/radius identifying no acute fracture dislocation as noted above. Patient's knee was Ace wrapped, knee immobilizer which was placed. Patient declines crutches. She states that she cannot take ibuprofen and has Tylenol at home and therefore she was sent home with prescriptions. We discussed the possibility of occult fracture and she will follow-up with her PCP and/or orthopedic specialist for symptoms that worsen or fail to improve within the next 7 days with potential need for reimaging to rule out an occult fracture. Strict return precautions. The patient was instructed to follow up as an outpatient in 1-2 days. The patient was instructed to return to the ED immediately for any new or worsening symptoms. The patient verbalized understanding and is agreeable to plan for discharge and follow-up. FINAL IMPRESSION    1. Knee injury, left, initial encounter    2. Fall (on) (from) other stairs and steps, initial encounter    3. Up to date with tetanus toxoid immunization    4. Contusion of multiple sites of right hand and wrist, initial encounter  5.   Knee abrasion, left, initial encounter    PLAN  Discharge with outpatient instructions and follow-up (See EMR)      (Please note that this note was completed with a voice recognition program.  Every attempt was made to edit the dictations, but inevitably there remain words that are mis-transcribed.)            Francisca Fields, ANABELLA - YEYO  07/06/22 3153

## 2024-05-17 ENCOUNTER — APPOINTMENT (OUTPATIENT)
Dept: GENERAL RADIOLOGY | Age: 64
DRG: 198 | End: 2024-05-17
Payer: COMMERCIAL

## 2024-05-17 ENCOUNTER — HOSPITAL ENCOUNTER (INPATIENT)
Age: 64
LOS: 1 days | Discharge: HOME | DRG: 198 | End: 2024-05-18
Attending: EMERGENCY MEDICINE | Admitting: INTERNAL MEDICINE
Payer: COMMERCIAL

## 2024-05-17 DIAGNOSIS — R07.9 CHEST PAIN, UNSPECIFIED TYPE: ICD-10-CM

## 2024-05-17 DIAGNOSIS — F41.1 ANXIETY STATE: ICD-10-CM

## 2024-05-17 DIAGNOSIS — T88.7XXA MEDICATION SIDE EFFECT: Primary | ICD-10-CM

## 2024-05-17 LAB
ALBUMIN SERPL-MCNC: 3.6 G/DL (ref 3.4–5)
ALBUMIN/GLOB SERPL: 1.5 {RATIO} (ref 1.1–2.2)
ALP SERPL-CCNC: 127 U/L (ref 40–129)
ALT SERPL-CCNC: 37 U/L (ref 10–40)
ANION GAP SERPL CALCULATED.3IONS-SCNC: 9 MMOL/L (ref 3–16)
APTT BLD: 30.7 SEC (ref 22.1–36.4)
AST SERPL-CCNC: 21 U/L (ref 15–37)
BASOPHILS # BLD: 0 K/UL (ref 0–0.2)
BASOPHILS NFR BLD: 0.4 %
BILIRUB SERPL-MCNC: 0.3 MG/DL (ref 0–1)
BUN SERPL-MCNC: 9 MG/DL (ref 7–20)
CALCIUM SERPL-MCNC: 9 MG/DL (ref 8.3–10.6)
CHLORIDE SERPL-SCNC: 106 MMOL/L (ref 99–110)
CO2 SERPL-SCNC: 25 MMOL/L (ref 21–32)
CREAT SERPL-MCNC: 1 MG/DL (ref 0.6–1.2)
DEPRECATED RDW RBC AUTO: 14.6 % (ref 12.4–15.4)
DEPRECATED RDW RBC AUTO: 14.7 % (ref 12.4–15.4)
EKG ATRIAL RATE: 64 BPM
EKG DIAGNOSIS: NORMAL
EKG P-R INTERVAL: 152 MS
EKG Q-T INTERVAL: 394 MS
EKG QRS DURATION: 94 MS
EKG QTC CALCULATION (BAZETT): 406 MS
EKG R AXIS: 133 DEGREES
EKG T AXIS: 135 DEGREES
EKG VENTRICULAR RATE: 64 BPM
EOSINOPHIL # BLD: 0.1 K/UL (ref 0–0.6)
EOSINOPHIL NFR BLD: 1.1 %
GFR SERPLBLD CREATININE-BSD FMLA CKD-EPI: 63 ML/MIN/{1.73_M2}
GLUCOSE SERPL-MCNC: 126 MG/DL (ref 70–99)
HCT VFR BLD AUTO: 37.4 % (ref 36–48)
HCT VFR BLD AUTO: 39.9 % (ref 36–48)
HGB BLD-MCNC: 12.5 G/DL (ref 12–16)
HGB BLD-MCNC: 13.2 G/DL (ref 12–16)
INR PPP: 1.01 (ref 0.85–1.15)
LYMPHOCYTES # BLD: 2.2 K/UL (ref 1–5.1)
LYMPHOCYTES NFR BLD: 29.5 %
MCH RBC QN AUTO: 31.9 PG (ref 26–34)
MCH RBC QN AUTO: 32.3 PG (ref 26–34)
MCHC RBC AUTO-ENTMCNC: 33.1 G/DL (ref 31–36)
MCHC RBC AUTO-ENTMCNC: 33.5 G/DL (ref 31–36)
MCV RBC AUTO: 96.2 FL (ref 80–100)
MCV RBC AUTO: 96.4 FL (ref 80–100)
MONOCYTES # BLD: 0.6 K/UL (ref 0–1.3)
MONOCYTES NFR BLD: 7.6 %
NEUTROPHILS # BLD: 4.6 K/UL (ref 1.7–7.7)
NEUTROPHILS NFR BLD: 61.4 %
PLATELET # BLD AUTO: 194 K/UL (ref 135–450)
PLATELET # BLD AUTO: 197 K/UL (ref 135–450)
PMV BLD AUTO: 10.2 FL (ref 5–10.5)
PMV BLD AUTO: 10.2 FL (ref 5–10.5)
POTASSIUM SERPL-SCNC: 3.8 MMOL/L (ref 3.5–5.1)
PROT SERPL-MCNC: 6 G/DL (ref 6.4–8.2)
PROTHROMBIN TIME: 13.5 SEC (ref 11.9–14.9)
RBC # BLD AUTO: 3.88 M/UL (ref 4–5.2)
RBC # BLD AUTO: 4.14 M/UL (ref 4–5.2)
SARS-COV-2 RDRP RESP QL NAA+PROBE: NOT DETECTED
SODIUM SERPL-SCNC: 140 MMOL/L (ref 136–145)
TROPONIN, HIGH SENSITIVITY: 13 NG/L (ref 0–14)
TROPONIN, HIGH SENSITIVITY: 9 NG/L (ref 0–14)
WBC # BLD AUTO: 6.3 K/UL (ref 4–11)
WBC # BLD AUTO: 7.6 K/UL (ref 4–11)

## 2024-05-17 PROCEDURE — 93010 ELECTROCARDIOGRAM REPORT: CPT | Performed by: INTERNAL MEDICINE

## 2024-05-17 PROCEDURE — 6360000002 HC RX W HCPCS: Performed by: PHYSICIAN ASSISTANT

## 2024-05-17 PROCEDURE — 6370000000 HC RX 637 (ALT 250 FOR IP): Performed by: PHYSICIAN ASSISTANT

## 2024-05-17 PROCEDURE — 85610 PROTHROMBIN TIME: CPT

## 2024-05-17 PROCEDURE — 96376 TX/PRO/DX INJ SAME DRUG ADON: CPT

## 2024-05-17 PROCEDURE — 36415 COLL VENOUS BLD VENIPUNCTURE: CPT

## 2024-05-17 PROCEDURE — 85027 COMPLETE CBC AUTOMATED: CPT

## 2024-05-17 PROCEDURE — 80053 COMPREHEN METABOLIC PANEL: CPT

## 2024-05-17 PROCEDURE — 6360000002 HC RX W HCPCS: Performed by: STUDENT IN AN ORGANIZED HEALTH CARE EDUCATION/TRAINING PROGRAM

## 2024-05-17 PROCEDURE — 6370000000 HC RX 637 (ALT 250 FOR IP): Performed by: INTERNAL MEDICINE

## 2024-05-17 PROCEDURE — 96375 TX/PRO/DX INJ NEW DRUG ADDON: CPT

## 2024-05-17 PROCEDURE — 2580000003 HC RX 258

## 2024-05-17 PROCEDURE — 85025 COMPLETE CBC W/AUTO DIFF WBC: CPT

## 2024-05-17 PROCEDURE — 99285 EMERGENCY DEPT VISIT HI MDM: CPT

## 2024-05-17 PROCEDURE — 93005 ELECTROCARDIOGRAM TRACING: CPT | Performed by: EMERGENCY MEDICINE

## 2024-05-17 PROCEDURE — 71046 X-RAY EXAM CHEST 2 VIEWS: CPT

## 2024-05-17 PROCEDURE — 87635 SARS-COV-2 COVID-19 AMP PRB: CPT

## 2024-05-17 PROCEDURE — 99223 1ST HOSP IP/OBS HIGH 75: CPT | Performed by: INTERNAL MEDICINE

## 2024-05-17 PROCEDURE — 83036 HEMOGLOBIN GLYCOSYLATED A1C: CPT

## 2024-05-17 PROCEDURE — 2060000000 HC ICU INTERMEDIATE R&B

## 2024-05-17 PROCEDURE — 6360000002 HC RX W HCPCS

## 2024-05-17 PROCEDURE — 85730 THROMBOPLASTIN TIME PARTIAL: CPT

## 2024-05-17 PROCEDURE — 96374 THER/PROPH/DIAG INJ IV PUSH: CPT

## 2024-05-17 PROCEDURE — 84484 ASSAY OF TROPONIN QUANT: CPT

## 2024-05-17 RX ORDER — SODIUM CHLORIDE 0.9 % (FLUSH) 0.9 %
5-40 SYRINGE (ML) INJECTION PRN
Status: DISCONTINUED | OUTPATIENT
Start: 2024-05-17 | End: 2024-05-18 | Stop reason: HOSPADM

## 2024-05-17 RX ORDER — ATORVASTATIN CALCIUM 40 MG/1
80 TABLET, FILM COATED ORAL NIGHTLY
Status: DISCONTINUED | OUTPATIENT
Start: 2024-05-17 | End: 2024-05-18 | Stop reason: HOSPADM

## 2024-05-17 RX ORDER — MORPHINE SULFATE 2 MG/ML
2 INJECTION, SOLUTION INTRAMUSCULAR; INTRAVENOUS ONCE
Status: COMPLETED | OUTPATIENT
Start: 2024-05-17 | End: 2024-05-17

## 2024-05-17 RX ORDER — ISOSORBIDE MONONITRATE 30 MG/1
60 TABLET, EXTENDED RELEASE ORAL DAILY
Status: DISCONTINUED | OUTPATIENT
Start: 2024-05-17 | End: 2024-05-18 | Stop reason: HOSPADM

## 2024-05-17 RX ORDER — ASPIRIN 81 MG/1
324 TABLET, CHEWABLE ORAL ONCE
Status: COMPLETED | OUTPATIENT
Start: 2024-05-17 | End: 2024-05-17

## 2024-05-17 RX ORDER — ASPIRIN 81 MG/1
81 TABLET, CHEWABLE ORAL DAILY
Status: DISCONTINUED | OUTPATIENT
Start: 2024-05-18 | End: 2024-05-17

## 2024-05-17 RX ORDER — REGADENOSON 0.08 MG/ML
0.4 INJECTION, SOLUTION INTRAVENOUS
Status: COMPLETED | OUTPATIENT
Start: 2024-05-17 | End: 2024-05-18

## 2024-05-17 RX ORDER — ENOXAPARIN SODIUM 100 MG/ML
40 INJECTION SUBCUTANEOUS DAILY
Status: DISCONTINUED | OUTPATIENT
Start: 2024-05-17 | End: 2024-05-17

## 2024-05-17 RX ORDER — SODIUM CHLORIDE 9 MG/ML
INJECTION, SOLUTION INTRAVENOUS PRN
Status: DISCONTINUED | OUTPATIENT
Start: 2024-05-17 | End: 2024-05-18 | Stop reason: HOSPADM

## 2024-05-17 RX ORDER — MAGNESIUM SULFATE IN WATER 40 MG/ML
2000 INJECTION, SOLUTION INTRAVENOUS PRN
Status: DISCONTINUED | OUTPATIENT
Start: 2024-05-17 | End: 2024-05-18 | Stop reason: HOSPADM

## 2024-05-17 RX ORDER — ERGOCALCIFEROL 1.25 MG/1
50000 CAPSULE ORAL WEEKLY
Status: DISCONTINUED | OUTPATIENT
Start: 2024-05-19 | End: 2024-05-18 | Stop reason: HOSPADM

## 2024-05-17 RX ORDER — ASPIRIN 81 MG/1
81 TABLET, CHEWABLE ORAL DAILY
Status: DISCONTINUED | OUTPATIENT
Start: 2024-05-17 | End: 2024-05-18 | Stop reason: HOSPADM

## 2024-05-17 RX ORDER — ALPRAZOLAM 0.5 MG/1
0.5 TABLET ORAL NIGHTLY PRN
Status: DISCONTINUED | OUTPATIENT
Start: 2024-05-17 | End: 2024-05-18 | Stop reason: HOSPADM

## 2024-05-17 RX ORDER — ONDANSETRON 4 MG/1
4 TABLET, ORALLY DISINTEGRATING ORAL EVERY 8 HOURS PRN
Status: DISCONTINUED | OUTPATIENT
Start: 2024-05-17 | End: 2024-05-18 | Stop reason: HOSPADM

## 2024-05-17 RX ORDER — ONDANSETRON 2 MG/ML
4 INJECTION INTRAMUSCULAR; INTRAVENOUS ONCE
Status: COMPLETED | OUTPATIENT
Start: 2024-05-17 | End: 2024-05-17

## 2024-05-17 RX ORDER — ONDANSETRON 2 MG/ML
4 INJECTION INTRAMUSCULAR; INTRAVENOUS EVERY 6 HOURS PRN
Status: DISCONTINUED | OUTPATIENT
Start: 2024-05-17 | End: 2024-05-18 | Stop reason: HOSPADM

## 2024-05-17 RX ORDER — POTASSIUM CHLORIDE 7.45 MG/ML
10 INJECTION INTRAVENOUS PRN
Status: DISCONTINUED | OUTPATIENT
Start: 2024-05-17 | End: 2024-05-18 | Stop reason: HOSPADM

## 2024-05-17 RX ORDER — ATORVASTATIN CALCIUM 40 MG/1
40 TABLET, FILM COATED ORAL NIGHTLY
Status: DISCONTINUED | OUTPATIENT
Start: 2024-05-17 | End: 2024-05-17

## 2024-05-17 RX ORDER — ACETAMINOPHEN 325 MG/1
650 TABLET ORAL EVERY 6 HOURS PRN
Status: DISCONTINUED | OUTPATIENT
Start: 2024-05-17 | End: 2024-05-18 | Stop reason: HOSPADM

## 2024-05-17 RX ORDER — VENLAFAXINE HYDROCHLORIDE 75 MG/1
225 CAPSULE, EXTENDED RELEASE ORAL
Status: DISCONTINUED | OUTPATIENT
Start: 2024-05-18 | End: 2024-05-18 | Stop reason: HOSPADM

## 2024-05-17 RX ORDER — LOSARTAN POTASSIUM 50 MG/1
50 TABLET ORAL 2 TIMES DAILY
Status: DISCONTINUED | OUTPATIENT
Start: 2024-05-17 | End: 2024-05-18 | Stop reason: HOSPADM

## 2024-05-17 RX ORDER — METOPROLOL SUCCINATE 25 MG/1
25 TABLET, EXTENDED RELEASE ORAL DAILY
Status: DISCONTINUED | OUTPATIENT
Start: 2024-05-17 | End: 2024-05-18 | Stop reason: HOSPADM

## 2024-05-17 RX ORDER — HEPARIN SODIUM 1000 [USP'U]/ML
2000 INJECTION, SOLUTION INTRAVENOUS; SUBCUTANEOUS PRN
Status: DISCONTINUED | OUTPATIENT
Start: 2024-05-18 | End: 2024-05-18

## 2024-05-17 RX ORDER — POLYETHYLENE GLYCOL 3350 17 G/17G
17 POWDER, FOR SOLUTION ORAL DAILY PRN
Status: DISCONTINUED | OUTPATIENT
Start: 2024-05-17 | End: 2024-05-18 | Stop reason: HOSPADM

## 2024-05-17 RX ORDER — NITROGLYCERIN 0.4 MG/1
0.4 TABLET SUBLINGUAL EVERY 5 MIN PRN
Status: DISCONTINUED | OUTPATIENT
Start: 2024-05-17 | End: 2024-05-18 | Stop reason: HOSPADM

## 2024-05-17 RX ORDER — 0.9 % SODIUM CHLORIDE 0.9 %
1000 INTRAVENOUS SOLUTION INTRAVENOUS ONCE
Status: DISCONTINUED | OUTPATIENT
Start: 2024-05-17 | End: 2024-05-17

## 2024-05-17 RX ORDER — POTASSIUM CHLORIDE 20 MEQ/1
40 TABLET, EXTENDED RELEASE ORAL PRN
Status: DISCONTINUED | OUTPATIENT
Start: 2024-05-17 | End: 2024-05-18 | Stop reason: HOSPADM

## 2024-05-17 RX ORDER — SODIUM CHLORIDE 0.9 % (FLUSH) 0.9 %
5-40 SYRINGE (ML) INJECTION EVERY 12 HOURS SCHEDULED
Status: DISCONTINUED | OUTPATIENT
Start: 2024-05-17 | End: 2024-05-18 | Stop reason: HOSPADM

## 2024-05-17 RX ORDER — ACETAMINOPHEN 650 MG/1
650 SUPPOSITORY RECTAL EVERY 6 HOURS PRN
Status: DISCONTINUED | OUTPATIENT
Start: 2024-05-17 | End: 2024-05-18 | Stop reason: HOSPADM

## 2024-05-17 RX ORDER — PANTOPRAZOLE SODIUM 40 MG/1
40 TABLET, DELAYED RELEASE ORAL DAILY
Status: DISCONTINUED | OUTPATIENT
Start: 2024-05-17 | End: 2024-05-18 | Stop reason: HOSPADM

## 2024-05-17 RX ORDER — HEPARIN SODIUM 1000 [USP'U]/ML
4000 INJECTION, SOLUTION INTRAVENOUS; SUBCUTANEOUS ONCE
Status: COMPLETED | OUTPATIENT
Start: 2024-05-17 | End: 2024-05-17

## 2024-05-17 RX ORDER — HEPARIN SODIUM 10000 [USP'U]/100ML
11.2 INJECTION, SOLUTION INTRAVENOUS CONTINUOUS
Status: DISCONTINUED | OUTPATIENT
Start: 2024-05-17 | End: 2024-05-18

## 2024-05-17 RX ORDER — NITROGLYCERIN 0.4 MG/1
0.4 TABLET SUBLINGUAL EVERY 5 MIN PRN
Status: DISCONTINUED | OUTPATIENT
Start: 2024-05-17 | End: 2024-05-17

## 2024-05-17 RX ORDER — MORPHINE SULFATE 4 MG/ML
4 INJECTION, SOLUTION INTRAMUSCULAR; INTRAVENOUS ONCE
Status: DISCONTINUED | OUTPATIENT
Start: 2024-05-17 | End: 2024-05-17

## 2024-05-17 RX ORDER — HEPARIN SODIUM 1000 [USP'U]/ML
4000 INJECTION, SOLUTION INTRAVENOUS; SUBCUTANEOUS PRN
Status: DISCONTINUED | OUTPATIENT
Start: 2024-05-18 | End: 2024-05-18

## 2024-05-17 RX ADMIN — MORPHINE SULFATE 2 MG: 2 INJECTION, SOLUTION INTRAMUSCULAR; INTRAVENOUS at 15:00

## 2024-05-17 RX ADMIN — ASPIRIN 324 MG: 81 TABLET, CHEWABLE ORAL at 12:22

## 2024-05-17 RX ADMIN — NITROGLYCERIN 0.4 MG: 0.4 TABLET SUBLINGUAL at 12:27

## 2024-05-17 RX ADMIN — ATORVASTATIN CALCIUM 80 MG: 40 TABLET, FILM COATED ORAL at 20:09

## 2024-05-17 RX ADMIN — ONDANSETRON 4 MG: 2 INJECTION INTRAMUSCULAR; INTRAVENOUS at 21:10

## 2024-05-17 RX ADMIN — MORPHINE SULFATE 2 MG: 2 INJECTION, SOLUTION INTRAMUSCULAR; INTRAVENOUS at 12:46

## 2024-05-17 RX ADMIN — HEPARIN SODIUM 11.2 UNITS/KG/HR: 10000 INJECTION, SOLUTION INTRAVENOUS at 21:02

## 2024-05-17 RX ADMIN — ENOXAPARIN SODIUM 40 MG: 100 INJECTION SUBCUTANEOUS at 18:14

## 2024-05-17 RX ADMIN — ONDANSETRON 4 MG: 2 INJECTION INTRAMUSCULAR; INTRAVENOUS at 12:28

## 2024-05-17 RX ADMIN — Medication 10 ML: at 20:10

## 2024-05-17 RX ADMIN — HEPARIN SODIUM 4000 UNITS: 1000 INJECTION INTRAVENOUS; SUBCUTANEOUS at 20:58

## 2024-05-17 RX ADMIN — LOSARTAN POTASSIUM 50 MG: 50 TABLET, FILM COATED ORAL at 20:10

## 2024-05-17 ASSESSMENT — PAIN SCALES - GENERAL
PAINLEVEL_OUTOF10: 7
PAINLEVEL_OUTOF10: 5
PAINLEVEL_OUTOF10: 6
PAINLEVEL_OUTOF10: 7
PAINLEVEL_OUTOF10: 5

## 2024-05-17 ASSESSMENT — PAIN DESCRIPTION - DESCRIPTORS
DESCRIPTORS: PRESSURE;TIGHTNESS
DESCRIPTORS: PRESSURE

## 2024-05-17 ASSESSMENT — LIFESTYLE VARIABLES
HOW MANY STANDARD DRINKS CONTAINING ALCOHOL DO YOU HAVE ON A TYPICAL DAY: PATIENT DOES NOT DRINK
HOW MANY STANDARD DRINKS CONTAINING ALCOHOL DO YOU HAVE ON A TYPICAL DAY: PATIENT DOES NOT DRINK
HOW OFTEN DO YOU HAVE A DRINK CONTAINING ALCOHOL: NEVER
HOW OFTEN DO YOU HAVE A DRINK CONTAINING ALCOHOL: NEVER

## 2024-05-17 ASSESSMENT — PAIN DESCRIPTION - LOCATION
LOCATION: CHEST

## 2024-05-17 ASSESSMENT — PAIN DESCRIPTION - ORIENTATION: ORIENTATION: MID;UPPER

## 2024-05-17 NOTE — PROGRESS NOTES
Patient admitted to room 321 from ER. Patient oriented to room, call light, bed rails, phone, lights and bathroom. Patient instructed about the schedule of the day including: vital sign frequency, lab draws, possible tests, frequency of MD and staff rounds, daily weights, I &O's and prescribed diet. Reg no caffeine Telemetry box in place, patient aware of placement and reason. Bed locked, in lowest position, side rails up 2/4, call light within reach.        Recliner Assessment  Patient is able to demonstrate the ability to move from a reclining position to an upright position within the recliner.       4 Eyes Skin Assessment     NAME:  Dea Georges  YOB: 1960  MEDICAL RECORD NUMBER:  [id number]    The patient is being assessed for  Admission    I agree that at least one RN has performed a thorough Head to Toe Skin Assessment on the patient. ALL assessment sites listed below have been assessed.      Areas assessed by both nurses:    Head, Face, Ears, Shoulders, Back, Chest, Arms, Elbows, Hands, Sacrum. Buttock, Coccyx, Ischium, Legs. Feet and Heels, and Under Medical Devices         Does the Patient have a Wound? No noted wound(s)       Nabil Prevention initiated by RN: No  Wound Care Orders initiated by RN: No    Pressure Injury (Stage 3,4, Unstageable, DTI, NWPT, and Complex wounds) if present, place Wound referral order by RN under : No    New Ostomies, if present place, Ostomy referral order under : No     Nurse 1 eSignature: Electronically signed by Lora Motley RN on 5/17/24 at 6:28 PM EDT    **SHARE this note so that the co-signing nurse can place an eSignature**    Nurse 2 eSignature: Electronically signed by Bria Baker RN on 5/17/24 at 6:45 PM EDT

## 2024-05-17 NOTE — ED PROVIDER NOTES
[facility] TELEMETRY  EMERGENCY DEPARTMENT ENCOUNTER        Pt Name: Dea Georges  MRN: [id number]  Birthdate 1960  Date of evaluation: 5/17/2024  Provider: Amy Gutiérrez PA-C  PCP: Dorota Bee MD  Note Started: 12:11 PM EDT 5/17/24       I have seen and evaluated this patient with my supervising physician Dr Kovacs      CHIEF COMPLAINT       Chief Complaint   Patient presents with    Anxiety     Reports chest pressure x 24 hours       HISTORY OF PRESENT ILLNESS: 1 or more Elements     History From: Patient  Limitations to history : None    Dea Georges is a 63 y.o. female who presents to the emergency department for evaluation of chest pressure across her chest for the past 24 hours worse with exertion and improves with rest she took 1 nitro sublingual prior to coming in without much change.  She has a history of 3 cardiac stents.  States she initially felt that her pain was due to stress and anxiety has been under a lot of stress that she is about to be evicted if does not come up with lot rent and she almost lost all of her belongings in storage.  No shortness of breath.  No leg swelling.  No history of DVT or PE.  No recent illness no cough or fevers.  Cardiac risk factors CAD, hypertension, hyperlipidemia, smoker.    Nursing Notes were all reviewed and agreed with or any disagreements were addressed in the HPI.    REVIEW OF SYSTEMS :      Review of Systems    Positives and Pertinent negatives as per HPI.     SURGICAL HISTORY     Past Surgical History:   Procedure Laterality Date    APPENDECTOMY      CORONARY ANGIOPLASTY WITH STENT PLACEMENT Left 04/2018    LIPOMA RESECTION Right     MS LAPAROSCOPY SURG CHOLECYSTECTOMY N/A 2/24/2018    CHOLECYSTECTOMY LAPAROSCOPIC ROBOTIC performed [facility]       [facility]       Current Discharge Medication List        CONTINUE these medications which have NOT CHANGED    Details   losartan (COZAAR) 100 MG  testing        EKG showing normal sinus rhythm nonspecific ST-T wave changes.  No stemi. Troponin WLN is 13, repeat is 9.      Sodium 144 potassium 3.8.  Glucose 126.  Creatinine 1.0.      Chest x-ray interpreted by radiologist and reviewed by myself no acute cardiopulmonary disease.      Patient did get symptomatic relief with aspirin, nitro and morphine here.  Heart score of 5.  Plan for admission for further cardiac evaluation.  Patient is stable.  Spoke with Dr. Benson has seen the patient in the ER and will admit and place orders.        I am the Primary Clinician of Record along with Dr Kovacs.  FINAL IMPRESSION      1. Chest pain, unspecified type    2.      CAD with stents  3.      Hypertension  4.      Hyperlipidemia       DISPOSITION/PLAN     DISPOSITION Admitted    PATIENT REFERRED TO:  No follow-up provider specified.    DISCHARGE MEDICATIONS:  Current Discharge Medication List          DISCONTINUED MEDICATIONS:  Current Discharge Medication List        STOP taking these medications       ibuprofen (ADVIL;MOTRIN) 600 MG tablet Comments:   Reason for Stopping:         acetaminophen (TYLENOL) 500 MG tablet Comments:   Reason for Stopping:                      (Please note that portions of this note were completed with a voice recognition program.  Efforts were made to edit the dictations but occasionally words are mis-transcribed.)    Amy Gutiérrez PA-C (electronically signed)          Amy Gutiérrez PA-C  05/17/24 4283

## 2024-05-17 NOTE — H&P
Hospital Medicine History & Physical      PCP: Dorota Bee MD    Date of Admission: 5/17/2024    Date of Service: Pt seen/examined on 5/17/2024     Chief Complaint:    Chief Complaint   Patient presents with    Anxiety     Reports chest pressure x 24 hours         History Of Present Illness:      The patient is a 63 y.o. female with CAD, hypertension, asthma, and arthritis who presents to [facility] with c/o chest pressure. Ongoing for the last 24 hours.  Worse with exertion and improves with rest.  She is under a lot of stress.  Had an angiogram done in 2/2024 and it was normal with stents all patent,    Vitals stable.  Labs stable.  CXR negative.  Admitted to PCU on tele. Cardiology consulted.     Past Medical History:        Diagnosis Date    Arthritis     hips and ankles, neck, lower back    Asthma     CAD in native artery 4/17/2018    Hypertension        Past Surgical History:        Procedure Laterality Date    APPENDECTOMY      CORONARY ANGIOPLASTY WITH STENT PLACEMENT Left 04/2018    LIPOMA RESECTION Right     CA LAPAROSCOPY SURG CHOLECYSTECTOMY N/A 2/24/2018    CHOLECYSTECTOMY LAPAROSCOPIC ROBOTIC performed by Joshua Johnson MD at [facility] OR       Medications Prior to Admission:    Prior to Admission medications    Medication Sig Start Date End Date Taking? Authorizing Provider   ibuprofen (ADVIL;MOTRIN) 600 MG tablet Take 1 tablet by mouth 3 times daily as needed for Pain With meals 7/6/22 7/6/22  Taylor Rodriguez APRN - CNP   acetaminophen (TYLENOL) 500 MG tablet Take 1 tablet by mouth 4 times daily as needed for Pain 7/6/22 7/6/22  Taylor Rodriguez APRN - CNP   losartan (COZAAR) 100 MG tablet Take 100 mg by mouth daily    ProviderZoe MD   vitamin B-12 (CYANOCOBALAMIN) 100 MCG tablet Take 50 mcg by mouth daily    ProviderZoe MD   atorvastatin (LIPITOR) 80 MG tablet Take 1 tablet by mouth nightly 4/17/18   Fanny Jerome APRN - CNP   clopidogrel (PLAVIX) 75  deviationNonspecific ST and T wave abnormality     RADIOLOGY  XR CHEST (2 VW)   Final Result   No significant findings in the chest.               Principal Problem:    Chest pain on exertion  Resolved Problems:    * No resolved hospital problems. *        ASSESSMENT/PLAN:  #Chest pain  #CAD  -admitted to PCU on tele  -Troponin negative x2  -aspirin, Atorvastatin, Toprol-XL, Imdur  -cardiology consulted  -stress test ordered    #Hypertension  -on Imdur, Toprol-XL, Losartan    #Hyperlipidemia   -on Atorvastatin    #Tobacco Dependence  -Recommended cessation      DVT Prophylaxis: Lovenox  Diet: Diet NPO  Code Status: Prior    ESTELA GRIFFITH MD

## 2024-05-17 NOTE — H&P
V2.0  History and Physical      Name:  Dea Georges /Age/Sex: 1960  (63 y.o. female)   MRN & CSN:  [id number] & [id number] Encounter Date/Time: 2024 7:47 PM EDT   Location:  /0321-01 PCP: Dorota Bee MD       Hospital Day: 1    Assessment and Plan:   Dea Georges is a 63 y.o. female with a pmh of tobacco use disorder, CAD status post PCI, who presents with substernal Chest pain on exertion    Hospital Problems             Last Modified POA    * (Principal) Chest pain on exertion 2024 Yes       Plan:  Substernal chest pain  Tobacco use disorder  CAD status post PCI  -High-sensitivity troponin x 2 negative, EKG with no acute ST changes, chest pain is typical.  Patient admits to active chest pain while at rest  -Patient follows up with general cardiology as an outpatient  -Continue aspirin and statin  -Given patient active chest pain will start heparin drip  -Cardiology consulted  -Stress test ordered, stress test done at [facility] in 2020 with no reversible ischemia  -Check lipid panel and A1c with a.m. labs  -N.p.o. at midnight    4.HTN  Continue losartan, Imdur and Toprol-XL    5.Anxiety  Continue venlafaxine and as needed Xanax    Disposition:   Current Living situation: Home  Expected Disposition: Home  Estimated D/C: 1-2 days    Diet ADULT DIET; Regular; No Caffeine  Diet NPO   DVT Prophylaxis [] Lovenox, [x]  Heparin, [] SCDs, [] Ambulation,  [] Eliquis, [] Xarelto, [] Coumadin   Code Status Full Code   Surrogate Decision Maker/ POA Patient     Personally reviewed Lab Studies and Imaging     Discussed management of the case with ER provider who recommended hospitalization    EKG interpreted personally and results with no acute ST changes    Imaging that was interpreted personally includes chest x-ray and results no acute cardiopulmonary disease    Drugs that require monitoring for toxicity include heparin drip and the method of monitoring was PTT H        History  12:38 PM     Lactic Acid: No results for input(s): \"LACTA\" in the last 72 hours.  BNP: No results for input(s): \"PROBNP\" in the last 72 hours.  UA:  Lab Results   Component Value Date/Time    NITRU Negative 07/06/2022 05:33 PM    COLORU DARK YELLOW 07/06/2022 05:33 PM    PHUR 5.0 07/06/2022 05:33 PM    LABCAST 0-1 Hyaline 11/15/2018 12:43 AM    WBCUA 6 07/06/2022 05:33 PM    RBCUA 5 07/06/2022 05:33 PM    MUCUS NOT REPORTED 02/19/2018 10:00 AM    TRICHOMONAS NOT REPORTED 02/19/2018 10:00 AM    YEAST NOT REPORTED 02/19/2018 10:00 AM    BACTERIA 1+ 07/06/2022 05:33 PM    CLARITYU CLOUDY 07/06/2022 05:33 PM    LEUKOCYTESUR TRACE 07/06/2022 05:33 PM    UROBILINOGEN 1.0 07/06/2022 05:33 PM    BILIRUBINUR SMALL 07/06/2022 05:33 PM    BILIRUBINUR NEGATIVE 01/18/2010 12:45 PM    BLOODU TRACE 07/06/2022 05:33 PM    GLUCOSEU Negative 07/06/2022 05:33 PM    GLUCOSEU NEGATIVE 01/18/2010 12:45 PM    KETUA TRACE 07/06/2022 05:33 PM    AMORPHOUS NOT REPORTED 02/19/2018 10:00 AM     Urine Cultures:   Lab Results   Component Value Date/Time    LABURIN  11/15/2018 01:20 AM     <50,000 CFU/ml mixed skin/urogenital willis. No further workup     Blood Cultures: No results found for: \"BC\"  No results found for: \"BLOODCULT2\"  Organism: No results found for: \"ORG\"    Imaging/Diagnostics Last 24 Hours   XR CHEST (2 VW)    Result Date: 5/17/2024  EXAMINATION: TWO XRAY VIEWS OF THE CHEST 5/17/2024 12:10 pm COMPARISON: 07/06/2022 radiograph HISTORY: ORDERING SYSTEM PROVIDED HISTORY: Chest Pain TECHNOLOGIST PROVIDED HISTORY: \"IF\" patient is in hallway or waiting room. Reason for exam:->Chest Pain Reason for Exam: Chest pain, anxiety and stress FINDINGS: The heart, mediastinum and pulmonary vascularity are normal.  Lungs are well-expanded and clear. No skeletal abnormalities are present in the chest.     No significant findings in the chest.         Electronically signed by Lida Evangelista MD on 5/17/2024 at 7:47 PM

## 2024-05-17 NOTE — PLAN OF CARE
Problem: Discharge Planning  Goal: Discharge to home or other facility with appropriate resources  Outcome: Adequate for Discharge  Flowsheets (Taken 5/17/2024 7314)  Discharge to home or other facility with appropriate resources: Arrange for needed discharge resources and transportation as appropriate     Problem: Pain  Goal: Verbalizes/displays adequate comfort level or baseline comfort level  Outcome: Adequate for Discharge     Problem: Safety - Adult  Goal: Free from fall injury  Outcome: Adequate for Discharge

## 2024-05-17 NOTE — PROGRESS NOTES
Consult has been called to Dr. Angel on 5/17/24. Spoke with Gurdeep. 5:10 PM    Sheryl Baker  5/17/2024

## 2024-05-17 NOTE — ED PROVIDER NOTES
In addition to the advanced practice provider, I personally saw Dea Georges and performed a substantive portion of the visit including all aspects of the medical decision making.    Medical Decision Making  Patient seen and evaluated at bedside.  Briefly, patient presenting with chest pressure shortly after finding out that she might be affected from her home.  However, patient does have an extensive cardiac history with heart stents.  Lab workup was reassuring in the ED, no anemia, leukocytosis or significant lecture abnormalities.  Troponin within normal levels x 2.  However, patient having persistent chest pressure on reexamination.  Although it is likely stress/anxiety related, cannot ignore the fact that patient does have extensive cardiac history so will admit for continued telemetry monitoring and cardiology evaluation.    EKG  Normal sinus rhythm no acute ST elevations. Ventricular rate of 64 bpm. Normal axis. QTc of 406.    SEP-1  Is this patient to be included in the SEP-1 Core Measure due to severe sepsis or septic shock?   No   Exclusion criteria - the patient is NOT to be included for SEP-1 Core Measure due to:  2+ SIRS criteria are not met    Screenings     Scott Coma Scale  Eye Opening: Spontaneous  Best Verbal Response: Oriented  Best Motor Response: Obeys commands  Scott Coma Scale Score: 15             Patient Referrals:  No follow-up provider specified.    Discharge Medications:  Current Discharge Medication List          FINAL IMPRESSION  1. Chest pain, unspecified type        Blood pressure 129/86, pulse 56, temperature 96.9 °F (36.1 °C), temperature source Oral, resp. rate 17, height 1.651 m (5' 5\"), weight 89.4 kg (197 lb 1.6 oz), SpO2 92 %.     I personally saw the patient and made/approved the management plan and take responsibility for the patient management.    For further details of Dea Georges's emergency department encounter, please see documentation by advanced practice

## 2024-05-18 ENCOUNTER — APPOINTMENT (OUTPATIENT)
Age: 64
DRG: 198 | End: 2024-05-18
Payer: COMMERCIAL

## 2024-05-18 ENCOUNTER — APPOINTMENT (OUTPATIENT)
Dept: NUCLEAR MEDICINE | Age: 64
DRG: 198 | End: 2024-05-18
Payer: COMMERCIAL

## 2024-05-18 ENCOUNTER — APPOINTMENT (OUTPATIENT)
Age: 64
DRG: 198 | End: 2024-05-18
Attending: STUDENT IN AN ORGANIZED HEALTH CARE EDUCATION/TRAINING PROGRAM
Payer: COMMERCIAL

## 2024-05-18 VITALS
TEMPERATURE: 97 F | WEIGHT: 197 LBS | OXYGEN SATURATION: 99 % | SYSTOLIC BLOOD PRESSURE: 114 MMHG | DIASTOLIC BLOOD PRESSURE: 80 MMHG | BODY MASS INDEX: 32.82 KG/M2 | HEIGHT: 65 IN | RESPIRATION RATE: 18 BRPM | HEART RATE: 60 BPM

## 2024-05-18 LAB
ANION GAP SERPL CALCULATED.3IONS-SCNC: 7 MMOL/L (ref 3–16)
ANTI-XA UNFRAC HEPARIN: >1.1 IU/ML (ref 0.3–0.7)
BUN SERPL-MCNC: 14 MG/DL (ref 7–20)
CALCIUM SERPL-MCNC: 8.5 MG/DL (ref 8.3–10.6)
CHLORIDE SERPL-SCNC: 105 MMOL/L (ref 99–110)
CHOLEST SERPL-MCNC: 129 MG/DL (ref 0–199)
CO2 SERPL-SCNC: 27 MMOL/L (ref 21–32)
CREAT SERPL-MCNC: 1.1 MG/DL (ref 0.6–1.2)
DEPRECATED RDW RBC AUTO: 15.1 % (ref 12.4–15.4)
ECHO AO ROOT DIAM: 3.2 CM
ECHO AO ROOT INDEX: 1.62 CM/M2
ECHO AV CUSP MM: 1.8 CM
ECHO AV PEAK GRADIENT: 5 MMHG
ECHO AV PEAK VELOCITY: 1.1 M/S
ECHO BSA: 2.02 M2
ECHO BSA: 2.02 M2
ECHO LA AREA 2C: 15.1 CM2
ECHO LA AREA 4C: 15.9 CM2
ECHO LA DIAMETER INDEX: 1.62 CM/M2
ECHO LA DIAMETER: 3.2 CM
ECHO LA MAJOR AXIS: 5.3 CM
ECHO LA MINOR AXIS: 5.1 CM
ECHO LA TO AORTIC ROOT RATIO: 1
ECHO LA VOL BP: 37 ML (ref 22–52)
ECHO LA VOL MOD A2C: 37 ML (ref 22–52)
ECHO LA VOL MOD A4C: 37 ML (ref 22–52)
ECHO LA VOL/BSA BIPLANE: 19 ML/M2 (ref 16–34)
ECHO LA VOLUME INDEX MOD A2C: 19 ML/M2 (ref 16–34)
ECHO LA VOLUME INDEX MOD A4C: 19 ML/M2 (ref 16–34)
ECHO LV E' LATERAL VELOCITY: 10 CM/S
ECHO LV E' SEPTAL VELOCITY: 10 CM/S
ECHO LV FRACTIONAL SHORTENING: 33 % (ref 28–44)
ECHO LV INTERNAL DIMENSION DIASTOLE INDEX: 2.34 CM/M2
ECHO LV INTERNAL DIMENSION DIASTOLIC: 4.6 CM (ref 3.9–5.3)
ECHO LV INTERNAL DIMENSION SYSTOLIC INDEX: 1.57 CM/M2
ECHO LV INTERNAL DIMENSION SYSTOLIC: 3.1 CM
ECHO LV ISOVOLUMETRIC RELAXATION TIME (IVRT): 85 MS
ECHO LV IVSD: 1.1 CM (ref 0.6–0.9)
ECHO LV MASS 2D: 181.2 G (ref 67–162)
ECHO LV MASS INDEX 2D: 92 G/M2 (ref 43–95)
ECHO LV POSTERIOR WALL DIASTOLIC: 1.1 CM (ref 0.6–0.9)
ECHO LV RELATIVE WALL THICKNESS RATIO: 0.48
ECHO MV A VELOCITY: 0.73 M/S
ECHO MV E DECELERATION TIME (DT): 322 MS
ECHO MV E VELOCITY: 0.64 M/S
ECHO MV E/A RATIO: 0.88
ECHO MV E/E' LATERAL: 6.4
ECHO MV E/E' RATIO (AVERAGED): 6.4
ECHO MV E/E' SEPTAL: 6.4
ECHO PV MAX VELOCITY: 0.8 M/S
ECHO PV PEAK GRADIENT: 3 MMHG
ECHO RA AREA 4C: 11.3 CM2
ECHO RA END SYSTOLIC VOLUME APICAL 4 CHAMBER INDEX BSA: 12 ML/M2
ECHO RA VOLUME: 24 ML
ECHO RV BASAL DIMENSION: 2.5 CM
ECHO RV FREE WALL PEAK S': 12 CM/S
ECHO RV LONGITUDINAL DIMENSION: 6.6 CM
ECHO RV MID DIMENSION: 1.9 CM
ECHO RV TAPSE: 2.3 CM (ref 1.7–?)
ECHO TV PEAK GRADIENT: 2 MMHG
GFR SERPLBLD CREATININE-BSD FMLA CKD-EPI: 56 ML/MIN/{1.73_M2}
GLUCOSE SERPL-MCNC: 107 MG/DL (ref 70–99)
HCT VFR BLD AUTO: 36.3 % (ref 36–48)
HDLC SERPL-MCNC: 45 MG/DL (ref 40–60)
HGB BLD-MCNC: 12.2 G/DL (ref 12–16)
LDLC SERPL CALC-MCNC: 57 MG/DL
MCH RBC QN AUTO: 32.1 PG (ref 26–34)
MCHC RBC AUTO-ENTMCNC: 33.6 G/DL (ref 31–36)
MCV RBC AUTO: 95.6 FL (ref 80–100)
NUC STRESS EJECTION FRACTION: 69 %
NUC STRESS LV EDV: 70 ML (ref 56–104)
NUC STRESS LV ESV: 22 ML (ref 19–49)
NUC STRESS LV MASS: 114 G
PLATELET # BLD AUTO: 171 K/UL (ref 135–450)
PMV BLD AUTO: 9.9 FL (ref 5–10.5)
POTASSIUM SERPL-SCNC: 3.8 MMOL/L (ref 3.5–5.1)
RBC # BLD AUTO: 3.8 M/UL (ref 4–5.2)
SODIUM SERPL-SCNC: 139 MMOL/L (ref 136–145)
STRESS BASELINE DIAS BP: 75 MMHG
STRESS BASELINE HR: 57 BPM
STRESS BASELINE SYS BP: 148 MMHG
STRESS ESTIMATED WORKLOAD: 1 METS
STRESS PEAK DIAS BP: 89 MMHG
STRESS PEAK SYS BP: 163 MMHG
STRESS PERCENT HR ACHIEVED: 48 %
STRESS POST PEAK HR: 75 BPM
STRESS RATE PRESSURE PRODUCT: NORMAL BPM*MMHG
STRESS TARGET HR: 156 BPM
TRIGL SERPL-MCNC: 133 MG/DL (ref 0–150)
VLDLC SERPL CALC-MCNC: 27 MG/DL
WBC # BLD AUTO: 6.7 K/UL (ref 4–11)

## 2024-05-18 PROCEDURE — 93017 CV STRESS TEST TRACING ONLY: CPT

## 2024-05-18 PROCEDURE — 6360000002 HC RX W HCPCS: Performed by: STUDENT IN AN ORGANIZED HEALTH CARE EDUCATION/TRAINING PROGRAM

## 2024-05-18 PROCEDURE — 93016 CV STRESS TEST SUPVJ ONLY: CPT | Performed by: STUDENT IN AN ORGANIZED HEALTH CARE EDUCATION/TRAINING PROGRAM

## 2024-05-18 PROCEDURE — 99238 HOSP IP/OBS DSCHRG MGMT 30/<: CPT | Performed by: INTERNAL MEDICINE

## 2024-05-18 PROCEDURE — 6370000000 HC RX 637 (ALT 250 FOR IP): Performed by: STUDENT IN AN ORGANIZED HEALTH CARE EDUCATION/TRAINING PROGRAM

## 2024-05-18 PROCEDURE — 3430000000 HC RX DIAGNOSTIC RADIOPHARMACEUTICAL

## 2024-05-18 PROCEDURE — 6370000000 HC RX 637 (ALT 250 FOR IP): Performed by: INTERNAL MEDICINE

## 2024-05-18 PROCEDURE — 93018 CV STRESS TEST I&R ONLY: CPT | Performed by: STUDENT IN AN ORGANIZED HEALTH CARE EDUCATION/TRAINING PROGRAM

## 2024-05-18 PROCEDURE — 78452 HT MUSCLE IMAGE SPECT MULT: CPT | Performed by: STUDENT IN AN ORGANIZED HEALTH CARE EDUCATION/TRAINING PROGRAM

## 2024-05-18 PROCEDURE — 6360000002 HC RX W HCPCS

## 2024-05-18 PROCEDURE — 36415 COLL VENOUS BLD VENIPUNCTURE: CPT

## 2024-05-18 PROCEDURE — A9502 TC99M TETROFOSMIN: HCPCS

## 2024-05-18 PROCEDURE — 2580000003 HC RX 258

## 2024-05-18 PROCEDURE — 93306 TTE W/DOPPLER COMPLETE: CPT

## 2024-05-18 PROCEDURE — 80061 LIPID PANEL: CPT

## 2024-05-18 PROCEDURE — 80048 BASIC METABOLIC PNL TOTAL CA: CPT

## 2024-05-18 PROCEDURE — 85027 COMPLETE CBC AUTOMATED: CPT

## 2024-05-18 PROCEDURE — 85520 HEPARIN ASSAY: CPT

## 2024-05-18 PROCEDURE — 99222 1ST HOSP IP/OBS MODERATE 55: CPT | Performed by: STUDENT IN AN ORGANIZED HEALTH CARE EDUCATION/TRAINING PROGRAM

## 2024-05-18 PROCEDURE — 78452 HT MUSCLE IMAGE SPECT MULT: CPT

## 2024-05-18 PROCEDURE — 93306 TTE W/DOPPLER COMPLETE: CPT | Performed by: STUDENT IN AN ORGANIZED HEALTH CARE EDUCATION/TRAINING PROGRAM

## 2024-05-18 RX ORDER — METOPROLOL SUCCINATE 100 MG/1
100 TABLET, EXTENDED RELEASE ORAL DAILY
Qty: 30 TABLET | Refills: 0
Start: 2024-05-18

## 2024-05-18 RX ORDER — AMINOPHYLLINE 25 MG/ML
100 INJECTION, SOLUTION INTRAVENOUS ONCE
Status: COMPLETED | OUTPATIENT
Start: 2024-05-18 | End: 2024-05-18

## 2024-05-18 RX ORDER — RANOLAZINE 500 MG/1
500 TABLET, EXTENDED RELEASE ORAL 2 TIMES DAILY
Status: ON HOLD | COMMUNITY
End: 2024-05-18

## 2024-05-18 RX ORDER — HEPARIN SODIUM 10000 [USP'U]/100ML
8.2 INJECTION, SOLUTION INTRAVENOUS CONTINUOUS
Status: DISCONTINUED | OUTPATIENT
Start: 2024-05-18 | End: 2024-05-18

## 2024-05-18 RX ORDER — AMINOPHYLLINE 25 MG/ML
100 INJECTION, SOLUTION INTRAVENOUS ONCE
Status: DISCONTINUED | OUTPATIENT
Start: 2024-05-18 | End: 2024-05-18 | Stop reason: HOSPADM

## 2024-05-18 RX ORDER — RANOLAZINE 1000 MG/1
1000 TABLET, EXTENDED RELEASE ORAL 2 TIMES DAILY
Qty: 60 TABLET | Refills: 2 | Status: SHIPPED | OUTPATIENT
Start: 2024-05-18 | End: 2024-08-16

## 2024-05-18 RX ORDER — RANOLAZINE 500 MG/1
1000 TABLET, EXTENDED RELEASE ORAL 2 TIMES DAILY
Status: DISCONTINUED | OUTPATIENT
Start: 2024-05-18 | End: 2024-05-18 | Stop reason: HOSPADM

## 2024-05-18 RX ADMIN — VENLAFAXINE HYDROCHLORIDE 225 MG: 75 CAPSULE, EXTENDED RELEASE ORAL at 07:39

## 2024-05-18 RX ADMIN — RANOLAZINE 1000 MG: 500 TABLET, FILM COATED, EXTENDED RELEASE ORAL at 12:36

## 2024-05-18 RX ADMIN — AMINOPHYLLINE 100 MG: 25 INJECTION, SOLUTION INTRAVENOUS at 09:15

## 2024-05-18 RX ADMIN — REGADENOSON 0.4 MG: 0.08 INJECTION, SOLUTION INTRAVENOUS at 09:05

## 2024-05-18 RX ADMIN — PANTOPRAZOLE SODIUM 40 MG: 40 TABLET, DELAYED RELEASE ORAL at 07:39

## 2024-05-18 RX ADMIN — HEPARIN SODIUM 8.2 UNITS/KG/HR: 10000 INJECTION, SOLUTION INTRAVENOUS at 04:57

## 2024-05-18 RX ADMIN — ISOSORBIDE MONONITRATE 60 MG: 30 TABLET, EXTENDED RELEASE ORAL at 11:23

## 2024-05-18 RX ADMIN — Medication 10 ML: at 07:36

## 2024-05-18 RX ADMIN — LOSARTAN POTASSIUM 50 MG: 50 TABLET, FILM COATED ORAL at 07:39

## 2024-05-18 RX ADMIN — TETROFOSMIN 11.2 MILLICURIE: 1.38 INJECTION, POWDER, LYOPHILIZED, FOR SOLUTION INTRAVENOUS at 08:00

## 2024-05-18 RX ADMIN — TETROFOSMIN 32 MILLICURIE: 1.38 INJECTION, POWDER, LYOPHILIZED, FOR SOLUTION INTRAVENOUS at 09:05

## 2024-05-18 RX ADMIN — ASPIRIN 81 MG: 81 TABLET, CHEWABLE ORAL at 07:39

## 2024-05-18 NOTE — PROGRESS NOTES
5/18  Anti-Xa = > 1.1 at 0255.  Hold heparin gtt for 1 hr and decrease heparin gtt to 8.2 units/kg/hr.  Recheck Anti-Xa in 6 hours.  Ari Palma PharmD  5/18/2024 3:38 AM

## 2024-05-18 NOTE — PROGRESS NOTES
Progress Note    Admit Date:  5/17/2024    Subjective:  Ms. Georges is feeling better. No chest pain. Plans for stress test. Nocturnist started heparin which I have stopped.    Objective:   /86   Pulse 56   Temp 96.9 °F (36.1 °C) (Oral)   Resp 17   Ht 1.651 m (5' 5\")   Wt 89.4 kg (197 lb 1.6 oz)   SpO2 92%   BMI 32.80 kg/m²        Intake/Output Summary (Last 24 hours) at 5/18/2024 0703  Last data filed at 5/18/2024 0340  Gross per 24 hour   Intake 200 ml   Output 300 ml   Net -100 ml       Physical Exam:    General appearance: alert, appears stated age and cooperative  Head: Normocephalic, without obvious abnormality, atraumatic  Eyes: conjunctivae/corneas clear. PERRL, EOM's intact.  Neck: no adenopathy, no carotid bruit, no JVD, supple, symmetrical, trachea midline and thyroid not enlarged, symmetric, no tenderness/mass/nodules  Lungs: clear to auscultation bilaterally  Heart: regular rate and rhythm, S1, S2 normal, no murmur, click, rub or gallop  Abdomen: soft, non-tender; bowel sounds normal; no masses,  no organomegaly  Extremities: extremities normal, atraumatic, no cyanosis or edema  Pulses: 2+ and symmetric  Skin: Skin color, texture, turgor normal. No rashes or lesions  Neurologic: Grossly normal    Scheduled Meds:   sodium chloride flush  5-40 mL IntraVENous 2 times per day    isosorbide mononitrate  30 mg Oral Daily    losartan  50 mg Oral BID    metoprolol succinate  25 mg Oral Daily    pantoprazole  40 mg Oral Daily    venlafaxine  225 mg Oral Daily with breakfast    [START ON 5/19/2024] vitamin D  50,000 Units Oral Weekly    aspirin  81 mg Oral Daily    atorvastatin  80 mg Oral Nightly       Continuous Infusions:   sodium chloride         PRN Meds:  sodium chloride flush, sodium chloride, potassium chloride **OR** potassium alternative oral replacement **OR** potassium chloride, magnesium sulfate, ondansetron **OR** ondansetron, acetaminophen **OR** acetaminophen, polyethylene glycol,  regadenoson, ALPRAZolam, nitroGLYCERIN      Data:  CBC:   Recent Labs     05/17/24  1204 05/17/24 2047 05/18/24  0256   WBC 7.6 6.3 6.7   HGB 13.2 12.5 12.2   HCT 39.9 37.4 36.3   MCV 96.4 96.2 95.6    194 171     BMP:   Recent Labs     05/17/24  1204 05/18/24  0256    139   K 3.8 3.8    105   CO2 25 27   BUN 9 14   CREATININE 1.0 1.1     LIVER PROFILE:   Recent Labs     05/17/24  1204   AST 21   ALT 37   BILITOT 0.3   ALKPHOS 127     PT/INR:   Recent Labs     05/17/24 2047   PROTIME 13.5   INR 1.01     Results in Past 30 Days  Result Component Current Result Ref Range Previous Result Ref Range   Troponin, High Sensitivity 9 (5/17/2024) 0 - 14 ng/L 13 (5/17/2024) 0 - 14 ng/L        No results found for requested labs within last 30 days.      Cultures:   Results       Procedure Component Value Units Date/Time    COVID-19, Rapid [[id number]] Collected: 05/17/24 1229    Order Status: Completed Specimen: Nasopharyngeal Swab Updated: 05/17/24 1250     SARS-CoV-2, NAAT Not Detected     Comment: Rapid NAAT:   Negative results should be treated as presumptive and,  if inconsistent with clinical signs and symptoms or necessary for  patient management, should be tested with an alternative molecular  assay. Negative results do not preclude SARS-CoV-2 infection and  should not be used as the sole basis for patient management decisions.  This test has been authorized by the FDA under an Emergency Use  Authorization (EUA) for use by authorized laboratories.    Fact sheet for Healthcare Providers:  https://www.fda.gov/media/944753/download  Fact sheet for Patients: https://www.fda.gov/media/897489/download    METHODOLOGY: Isothermal Nucleic Acid Amplification                  XR CHEST (2 VW)   Final Result   No significant findings in the chest.           ECG    Baseline artifactNormal sinus rhythmLow voltage QRS Limb leadRight axis deviationNonspecific ST and T wave abnormalityAbnormal ECGConfirmed by JACKELINE

## 2024-05-18 NOTE — NURSING NOTE
A lexiscan stress test was completed on this patient as ordered. The patient complained of shortness of breath and nausea after lexiscan, symptoms resolved after aminophylline 100mg IV. Awaiting stress imaging in nuclear medicine department at this time.

## 2024-05-18 NOTE — FLOWSHEET NOTE
05/17/24 2347   Vital Signs   Temp 97 °F (36.1 °C)   Temp Source Oral   Pulse 60   Heart Rate Source Monitor   Respirations 18   /64   MAP (Calculated) 79   BP Location Left upper arm   BP Method Automatic   Patient Position Semi fowlers   Oxygen Therapy   SpO2 94 %   O2 Device None (Room air)     Pt reassessment complete.  No changes noted.  Denies needs. NPO at this time.  Call light within reach. Bed exit alarm on.

## 2024-05-18 NOTE — CONSULTS
CARDIOLOGY CONSULTATION      Patient Name: Dea Georges  Date of admission: 5/17/2024 11:38 AM  Admission Dx: Chest pain on exertion [R07.9]  Chest pain, unspecified type [R07.9]  Requesting Physician: Olga Benson MD  Primary Care physician: Dorota Bee MD    Reason for Consultation/Chief Complaint: Chest Pain     History of Present Illness:     Dea Georges is a 63 y.o. patient with history of CAD with stable angina, coronary artery disease status post RCA PCI in June 2019, hypertension, HUMZA who presented to the hospital with complaints of chest pain.  Patient reports she has had chest pressure ongoing for the last 24 hours.  States worse with exertion however improves with rest.  Additionally states she has been under recent stress due to she is about to be evicted and she may lose all of her belongings in storage.  Denies any shortness of breath.  Denies any lower extremity edema.  Denies any orthopnea.  Denies any near-syncope or syncope.     Seen after both echocardiogram and stress test.  Echocardiogram with preserved LVEF normal wall motion.  Izabel MPI today with apical resting perfusion defect that improved with stress images and with attenuation correction.  There is adjacent extracardiac GI uptake.  Most consistent with artifact, no evidence of stress ischemia.  Additionally , patient underwent left heart catheterization in February 2024 at [facility] which demonstrated patent stents in the RCA and diagonal.  Otherwise no other significant disease mention.    Blood pressure has been well-controlled to 109 129 systolic with diastolics in 60s.      Past Medical History:   has a past medical history of Arthritis, Asthma, CAD in native artery, and Hypertension.    Surgical History:   has a past surgical history that includes lipoma resection (Right); Appendectomy; pr laparoscopy surg cholecystectomy (N/A, 2/24/2018); and Coronary angioplasty with stent (Left, 04/2018).

## 2024-05-18 NOTE — DISCHARGE SUMMARY
Name:  Dea Georges  Room:  /0321-01  MRN:    [id number]    Discharge Summary      This discharge summary is in conjunction with a complete physical exam done on the day of discharge.      Discharging Physician: ESTELA GRIFFITH MD      Admit: 5/17/2024  Discharge:  5/18/2024     Diagnoses this Admission    Principal Problem:    Chest pain on exertion  Active Problems:    Tobacco abuse disorder    HTN (hypertension)    Mild intermittent asthma without complication    Coronary artery disease involving native coronary artery of native heart with angina pectoris (HCC)    Ischemic cardiomyopathy  Resolved Problems:    * No resolved hospital problems. *      Procedures (Please Review Full Report for Details)  none    Consults    IP CONSULT TO CARDIOLOGY  PHARMACY TO DOSE MEDICATION      HPI:    The patient is a 63 y.o. female with CAD, hypertension, asthma, and arthritis who presents to [facility] with c/o chest pressure. Ongoing for the last 24 hours.  Worse with exertion and improves with rest.  She is under a lot of stress.  Had an angiogram done in 2/2024 and it was normal with stents all patent,     Vitals stable.  Labs stable.  CXR negative.  Admitted to PCU on tele. Cardiology consulted.        Physical Exam at Discharge:  /80   Pulse 60   Temp 97 °F (36.1 °C) (Oral)   Resp 18   Ht 1.651 m (5' 5\")   Wt 89.4 kg (197 lb)   SpO2 99%   BMI 32.78 kg/m²     See progress note      Hospital Course    #Chest pain  #CAD  -admitted to PCU on tele  -Troponin negative x2  -aspirin, Atorvastatin, Toprol-XL, Imdur  -cardiology consulted  -stress test ordered. ECHO ordered. They are both normal  Increase Ranexa to 1000  mg po bid.     #Hypertension  -on Imdur, Toprol-XL, Losartan     #Hyperlipidemia   -on Atorvastatin     #Tobacco Dependence  -Recommended cessation    CBC:   Recent Labs     05/17/24  1204 05/17/24  2047 05/18/24  0256   WBC 7.6 6.3 6.7   HGB 13.2 12.5 12.2   HCT 39.9 37.4 36.3    MCV 96.4 96.2 95.6    194 171     BMP:   Recent Labs     05/17/24  1204 05/18/24  0256    139   K 3.8 3.8    105   CO2 25 27   BUN 9 14   CREATININE 1.0 1.1     LIVER PROFILE:   Recent Labs     05/17/24  1204   AST 21   ALT 37   BILITOT 0.3   ALKPHOS 127     PT/INR:   Recent Labs     05/17/24 2047   PROTIME 13.5   INR 1.01     APTT:   Recent Labs     05/17/24 2047   APTT 30.7       Results in Past 30 Days  Result Component Current Result Ref Range Previous Result Ref Range   Troponin, High Sensitivity 9 (5/17/2024) 0 - 14 ng/L 13 (5/17/2024) 0 - 14 ng/L      Stress test    Stress Combined Conclusion: The study is negative for myocardial   ischemia. There is a resting perfusion defect of mild to moderate   intensity involving the apex that improves with stress images. With   attenuation correction, there is a resting perfusion defect involving the   apex of mild to moderate intensity that improves with attenuation   correction and with stress images. This is most consistent with artifact.   Additionally, there is extra-cardiac tracer uptake adjacent to the apex.   There is no evidence of stress-induced ischemia . LV size is normal and   function is normal. There is no evidence of TID. Overall findings suggest   low risk nuclear perfusion stress test.     Perfusion Comments: LV perfusion is probably normal.       ECHO 5/18/24    Left Ventricle: Normal left ventricular systolic function with a   visually estimated EF of 55%. Left ventricle size is normal. Mildly   increased wall thickness. Normal wall motion. Grade I diastolic   dysfunction with normal LAP.     Right Ventricle: Right ventricle size is normal. Normal systolic   function. TAPSE is 2.3 cm. RV Peak S' is 12 cm/s.     Aortic Valve: Trileaflet valve. Mildly thickened cusp. No   regurgitation. No stenosis.     Mitral Valve: Mildly thickened leaflet, at the anterior leaflet. Mild   annular calcification of the mitral valve. Mild

## 2024-05-18 NOTE — FLOWSHEET NOTE
05/17/24 2001   Vital Signs   Temp (!) 96.7 °F (35.9 °C)   Temp Source Oral   Pulse 59   Heart Rate Source Monitor   Respirations 18   BP (!) 109/55   MAP (Calculated) 73   BP Location Left upper arm   BP Method Automatic   Patient Position Semi fowlers     Pt assessment complete.  Pt lying in bed quietly.  Lung sounds clear.  Pt on room air.  Pt NSR per monitor with HR of 59.  Doctor at Pt's bedside.  New orders placed.  Nightly medications given.  Pt to be NPO after midnight for cardiology consult and stress test.  Pt verbalizes understanding.  Denies needs at this time.  Call light within reach. Bed exit alarm on.

## 2024-05-18 NOTE — NURSING NOTE
Patient arrived to stress lab for Lexiscan/Myoview Stress test.  Patient was educated on procedure, all questions answered, and consent verified.

## 2024-05-18 NOTE — FLOWSHEET NOTE
05/18/24 0717   Vital Signs   Temp 96.9 °F (36.1 °C)   Temp Source Oral   Pulse 56   Heart Rate Source Monitor   Respirations 17   /86   MAP (Calculated) 100   BP Location Left upper arm   BP Method Automatic   Patient Position Supine   Pain Assessment   Pain Assessment None - Denies Pain   Oxygen Therapy   SpO2 92 %   O2 Device None (Room air)     AM assessment complete. Pt a&o x4. Denies any pain or discomfort. No sob noted. NPO for stress test. Heparin gtt d/c'd per MD orders. Morning medications given per stress nurse recommendations. Metoprolol held d/t orders. No edema noted. Call light and bedside table within reach. Will continue to monitor.

## 2024-05-18 NOTE — CONSULTS
Low Dose Heparin protocol:  Once baseline aPTT has been collected give a 4,000 unit IV bolus dose of heparin and begin the infusion at 1,000 units/hr.  Check an anti-Xa 6 hrs later.  Desired range is 0.3-0.7IU/mL.  Dosing based on reported weight of 89.4kg.    Pharmacy to manage Heparin - contact for questions.    Heparin 60 units/kg IV x 1 (max 4,000 units), then 12 units/kg/hr (recommended max initial rate 1,000 units/hr). Adjust infusion rate based off anti-Xa results below.     anti-Xa < 0.1    Heparin 60 units/kg bolus  Increase infusion by 4 units/kg/hr  anti-Xa 0.1-0.29 Heparin 30 units/kg bolus Increase infusion by 2 units/kg/hr  anti-Xa 0.3-0.7    No bolus No change   anti-Xa 0.71-0.8   No bolus Decrease infusion by 1 units/kg/hr  anti-Xa 0.81-0.99    No bolus Decrease infusion by 2 units/kg/hr  anti-Xa 1 or more     Hold heparin for 1 hour Decrease infusion by 3 units/kg/hr    Obtain anti-Xa 6 hours after bolus and 6 hours after any dose change until two consecutive therapeutic anti-Xa are achieved- then daily.  Suresh Jones RPH PharmD 5/17/2024 7:59 PM

## 2024-05-19 LAB
EST. AVERAGE GLUCOSE BLD GHB EST-MCNC: 122.6 MG/DL
HBA1C MFR BLD: 5.9 %

## 2024-06-09 ENCOUNTER — APPOINTMENT (OUTPATIENT)
Dept: CT IMAGING | Age: 64
End: 2024-06-09
Payer: COMMERCIAL

## 2024-06-09 ENCOUNTER — HOSPITAL ENCOUNTER (EMERGENCY)
Age: 64
Discharge: HOME | End: 2024-06-09
Attending: STUDENT IN AN ORGANIZED HEALTH CARE EDUCATION/TRAINING PROGRAM
Payer: COMMERCIAL

## 2024-06-09 ENCOUNTER — APPOINTMENT (OUTPATIENT)
Dept: GENERAL RADIOLOGY | Age: 64
End: 2024-06-09
Payer: COMMERCIAL

## 2024-06-09 VITALS
HEART RATE: 69 BPM | OXYGEN SATURATION: 99 % | HEIGHT: 65 IN | TEMPERATURE: 98.2 F | DIASTOLIC BLOOD PRESSURE: 82 MMHG | SYSTOLIC BLOOD PRESSURE: 145 MMHG | RESPIRATION RATE: 16 BRPM | WEIGHT: 180 LBS | BODY MASS INDEX: 29.99 KG/M2

## 2024-06-09 DIAGNOSIS — K57.92 ACUTE DIVERTICULITIS: Primary | ICD-10-CM
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
## 2024-06-09 LAB
ALBUMIN SERPL-MCNC: 3.5 G/DL (ref 3.4–5)
ALBUMIN/GLOB SERPL: 1.5 {RATIO} (ref 1.1–2.2)
ALP SERPL-CCNC: 126 U/L (ref 40–129)
ALT SERPL-CCNC: 12 U/L (ref 10–40)
ANION GAP SERPL CALCULATED.3IONS-SCNC: 6 MMOL/L (ref 3–16)
AST SERPL-CCNC: 11 U/L (ref 15–37)
BACTERIA URNS QL MICRO: ABNORMAL /HPF
BASOPHILS # BLD: 0 K/UL (ref 0–0.2)
BASOPHILS NFR BLD: 0.3 %
BILIRUB SERPL-MCNC: 0.3 MG/DL (ref 0–1)
BILIRUB UR QL STRIP.AUTO: NEGATIVE
BUN SERPL-MCNC: 16 MG/DL (ref 7–20)
CALCIUM SERPL-MCNC: 8.9 MG/DL (ref 8.3–10.6)
CHLORIDE SERPL-SCNC: 103 MMOL/L (ref 99–110)
CLARITY UR: CLEAR
CO2 SERPL-SCNC: 32 MMOL/L (ref 21–32)
COLOR UR: YELLOW
CREAT SERPL-MCNC: 1 MG/DL (ref 0.6–1.2)
DEPRECATED RDW RBC AUTO: 15.3 % (ref 12.4–15.4)
EOSINOPHIL # BLD: 0.1 K/UL (ref 0–0.6)
EOSINOPHIL NFR BLD: 1.2 %
EPI CELLS #/AREA URNS HPF: ABNORMAL /HPF (ref 0–5)
GFR SERPLBLD CREATININE-BSD FMLA CKD-EPI: 63 ML/MIN/{1.73_M2}
GLUCOSE SERPL-MCNC: 82 MG/DL (ref 70–99)
GLUCOSE UR STRIP.AUTO-MCNC: NEGATIVE MG/DL
HCT VFR BLD AUTO: 37 % (ref 36–48)
HGB BLD-MCNC: 12.2 G/DL (ref 12–16)
HGB UR QL STRIP.AUTO: NEGATIVE
KETONES UR STRIP.AUTO-MCNC: NEGATIVE MG/DL
LEUKOCYTE ESTERASE UR QL STRIP.AUTO: ABNORMAL
LIPASE SERPL-CCNC: 13 U/L (ref 13–60)
LYMPHOCYTES # BLD: 2.3 K/UL (ref 1–5.1)
LYMPHOCYTES NFR BLD: 30.2 %
MCH RBC QN AUTO: 32.4 PG (ref 26–34)
MCHC RBC AUTO-ENTMCNC: 33 G/DL (ref 31–36)
MCV RBC AUTO: 98.5 FL (ref 80–100)
MONOCYTES # BLD: 0.7 K/UL (ref 0–1.3)
MONOCYTES NFR BLD: 9.6 %
MUCOUS THREADS #/AREA URNS LPF: ABNORMAL /LPF
NEUTROPHILS # BLD: 4.5 K/UL (ref 1.7–7.7)
NEUTROPHILS NFR BLD: 58.7 %
NITRITE UR QL STRIP.AUTO: NEGATIVE
PH UR STRIP.AUTO: 6.5 [PH] (ref 5–8)
PLATELET # BLD AUTO: 213 K/UL (ref 135–450)
PMV BLD AUTO: 9 FL (ref 5–10.5)
POTASSIUM SERPL-SCNC: 4 MMOL/L (ref 3.5–5.1)
PROT SERPL-MCNC: 5.9 G/DL (ref 6.4–8.2)
PROT UR STRIP.AUTO-MCNC: NEGATIVE MG/DL
RBC # BLD AUTO: 3.76 M/UL (ref 4–5.2)
RBC #/AREA URNS HPF: ABNORMAL /HPF (ref 0–4)
SODIUM SERPL-SCNC: 141 MMOL/L (ref 136–145)
SP GR UR STRIP.AUTO: 1.02 (ref 1–1.03)
UA DIPSTICK W REFLEX MICRO PNL UR: YES
URN SPEC COLLECT METH UR: ABNORMAL
UROBILINOGEN UR STRIP-ACNC: 0.2 E.U./DL
WBC # BLD AUTO: 7.6 K/UL (ref 4–11)
WBC #/AREA URNS HPF: ABNORMAL /HPF (ref 0–5)

## 2024-06-09 PROCEDURE — 73110 X-RAY EXAM OF WRIST: CPT

## 2024-06-09 PROCEDURE — 81001 URINALYSIS AUTO W/SCOPE: CPT

## 2024-06-09 PROCEDURE — 74177 CT ABD & PELVIS W/CONTRAST: CPT

## 2024-06-09 PROCEDURE — 83690 ASSAY OF LIPASE: CPT

## 2024-06-09 PROCEDURE — 36415 COLL VENOUS BLD VENIPUNCTURE: CPT

## 2024-06-09 PROCEDURE — 80053 COMPREHEN METABOLIC PANEL: CPT

## 2024-06-09 PROCEDURE — 99285 EMERGENCY DEPT VISIT HI MDM: CPT

## 2024-06-09 PROCEDURE — 6360000004 HC RX CONTRAST MEDICATION: Performed by: STUDENT IN AN ORGANIZED HEALTH CARE EDUCATION/TRAINING PROGRAM

## 2024-06-09 PROCEDURE — 85025 COMPLETE CBC W/AUTO DIFF WBC: CPT

## 2024-06-09 PROCEDURE — 73560 X-RAY EXAM OF KNEE 1 OR 2: CPT

## 2024-06-09 RX ORDER — AMOXICILLIN AND CLAVULANATE POTASSIUM 875; 125 MG/1; MG/1
1 TABLET, FILM COATED ORAL 2 TIMES DAILY
Qty: 14 TABLET | Refills: 0 | Status: SHIPPED | OUTPATIENT
Start: 2024-06-09 | End: 2024-06-16

## 2024-06-09 RX ADMIN — IOPAMIDOL 75 ML: 755 INJECTION, SOLUTION INTRAVENOUS at 18:20

## 2024-06-09 ASSESSMENT — LIFESTYLE VARIABLES
HOW OFTEN DO YOU HAVE A DRINK CONTAINING ALCOHOL: NEVER
HOW MANY STANDARD DRINKS CONTAINING ALCOHOL DO YOU HAVE ON A TYPICAL DAY: PATIENT DOES NOT DRINK

## 2024-06-09 ASSESSMENT — PAIN SCALES - GENERAL: PAINLEVEL_OUTOF10: 10

## 2024-06-09 ASSESSMENT — PAIN - FUNCTIONAL ASSESSMENT: PAIN_FUNCTIONAL_ASSESSMENT: 0-10

## 2024-06-09 NOTE — ED PROVIDER NOTES
[facility] ED     EMERGENCY DEPARTMENT ENCOUNTER            Pt Name: Dea Georges   MRN: [id number]   Birthdate 1960   Date of evaluation: 6/9/2024   Provider: Svetlana German MD   PCP: Dorota Bee MD   Note Started: 4:12 PM EDT 6/9/24          CHIEF COMPLAINT     Chief Complaint   Patient presents with    Fall     Fell going up the porch steps last night; complaining of both knee pains; right wrist pains and lower abdominal pains; went to Urgent Care this AM who told her she could get a CT and to go home and eat lunch and then go to the ER...      HISTORY OF PRESENT ILLNESS:   History from : Patient   Limitations to history : None     Dea Georges is a 63 y.o. female who presents complaining of mechanical fall.  Patient states that she has a history of a neurologic issue which causes her to fall frequently.  She states that yesterday, she was going up the porch steps carrying some groceries and tripped and fell.  She states that she is having pain in her right wrist, left knee, as well as her left lower abdomen that started when she fell.  She denies any fevers.  Denies head trauma or loss of consciousness.  Is not anticoagulated.  She denies any other complaints or concerns.  Denies nausea or vomiting, diarrhea or constipation    Nursing Notes were all reviewed and agreed with, or any disagreements were addressed in the HPI.     REVIEW OF SYSTEMS :    Positives and Pertinent negatives as per HPI.      MEDICAL HISTORY   has a past medical history of Arthritis, Asthma, CAD in native artery (4/17/2018), and Hypertension.    Past Surgical History:   Procedure Laterality Date    APPENDECTOMY      CORONARY ANGIOPLASTY WITH STENT PLACEMENT Left 04/2018    LIPOMA RESECTION Right     [place] LAPAROSCOPY SURG CHOLECYSTECTOMY N/A 2/24/2018    CHOLECYSTECTOMY LAPAROSCOPIC ROBOTIC performed by Joshua Johnson MD at [facility] OR      CURRENTMEDICATIONS       Previous Medications

## 2024-06-12 ENCOUNTER — APPOINTMENT (OUTPATIENT)
Dept: CT IMAGING | Age: 64
End: 2024-06-12
Payer: COMMERCIAL

## 2024-06-12 ENCOUNTER — HOSPITAL ENCOUNTER (EMERGENCY)
Age: 64
Discharge: HOME | End: 2024-06-12
Payer: COMMERCIAL

## 2024-06-12 ENCOUNTER — APPOINTMENT (OUTPATIENT)
Dept: GENERAL RADIOLOGY | Age: 64
End: 2024-06-12
Payer: COMMERCIAL

## 2024-06-12 VITALS
HEART RATE: 60 BPM | SYSTOLIC BLOOD PRESSURE: 111 MMHG | WEIGHT: 180 LBS | HEIGHT: 65 IN | TEMPERATURE: 97.9 F | OXYGEN SATURATION: 97 % | DIASTOLIC BLOOD PRESSURE: 59 MMHG | BODY MASS INDEX: 29.99 KG/M2 | RESPIRATION RATE: 16 BRPM

## 2024-06-12 DIAGNOSIS — R07.89 ATYPICAL CHEST PAIN: Primary | ICD-10-CM
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
## 2024-06-12 LAB
ALBUMIN SERPL-MCNC: 3.5 G/DL (ref 3.4–5)
ALBUMIN/GLOB SERPL: 1.3 {RATIO} (ref 1.1–2.2)
ALP SERPL-CCNC: 113 U/L (ref 40–129)
ALT SERPL-CCNC: 11 U/L (ref 10–40)
ANION GAP SERPL CALCULATED.3IONS-SCNC: 8 MMOL/L (ref 3–16)
AST SERPL-CCNC: 14 U/L (ref 15–37)
BASOPHILS # BLD: 0 K/UL (ref 0–0.2)
BASOPHILS NFR BLD: 0.3 %
BILIRUB SERPL-MCNC: 0.3 MG/DL (ref 0–1)
BUN SERPL-MCNC: 16 MG/DL (ref 7–20)
CALCIUM SERPL-MCNC: 9.2 MG/DL (ref 8.3–10.6)
CHLORIDE SERPL-SCNC: 102 MMOL/L (ref 99–110)
CO2 SERPL-SCNC: 24 MMOL/L (ref 21–32)
CREAT SERPL-MCNC: 1 MG/DL (ref 0.6–1.2)
DEPRECATED RDW RBC AUTO: 15 % (ref 12.4–15.4)
EOSINOPHIL # BLD: 0.1 K/UL (ref 0–0.6)
EOSINOPHIL NFR BLD: 0.8 %
GFR SERPLBLD CREATININE-BSD FMLA CKD-EPI: 63 ML/MIN/{1.73_M2}
GLUCOSE SERPL-MCNC: 79 MG/DL (ref 70–99)
HCT VFR BLD AUTO: 37.3 % (ref 36–48)
HGB BLD-MCNC: 12.4 G/DL (ref 12–16)
LYMPHOCYTES # BLD: 1.1 K/UL (ref 1–5.1)
LYMPHOCYTES NFR BLD: 15.5 %
MCH RBC QN AUTO: 32.6 PG (ref 26–34)
MCHC RBC AUTO-ENTMCNC: 33.4 G/DL (ref 31–36)
MCV RBC AUTO: 97.8 FL (ref 80–100)
MONOCYTES # BLD: 0.8 K/UL (ref 0–1.3)
MONOCYTES NFR BLD: 10.3 %
NEUTROPHILS # BLD: 5.4 K/UL (ref 1.7–7.7)
NEUTROPHILS NFR BLD: 73.1 %
PLATELET # BLD AUTO: 223 K/UL (ref 135–450)
PMV BLD AUTO: 9.1 FL (ref 5–10.5)
POTASSIUM SERPL-SCNC: 4.9 MMOL/L (ref 3.5–5.1)
PROT SERPL-MCNC: 6.2 G/DL (ref 6.4–8.2)
RBC # BLD AUTO: 3.81 M/UL (ref 4–5.2)
SODIUM SERPL-SCNC: 134 MMOL/L (ref 136–145)
TROPONIN, HIGH SENSITIVITY: 8 NG/L (ref 0–14)
TROPONIN, HIGH SENSITIVITY: 8 NG/L (ref 0–14)
WBC # BLD AUTO: 7.4 K/UL (ref 4–11)

## 2024-06-12 PROCEDURE — 6370000000 HC RX 637 (ALT 250 FOR IP)

## 2024-06-12 PROCEDURE — 99285 EMERGENCY DEPT VISIT HI MDM: CPT

## 2024-06-12 PROCEDURE — 71046 X-RAY EXAM CHEST 2 VIEWS: CPT

## 2024-06-12 PROCEDURE — 80053 COMPREHEN METABOLIC PANEL: CPT

## 2024-06-12 PROCEDURE — 70450 CT HEAD/BRAIN W/O DYE: CPT

## 2024-06-12 PROCEDURE — 84484 ASSAY OF TROPONIN QUANT: CPT

## 2024-06-12 PROCEDURE — 85025 COMPLETE CBC W/AUTO DIFF WBC: CPT

## 2024-06-12 PROCEDURE — 72125 CT NECK SPINE W/O DYE: CPT

## 2024-06-12 PROCEDURE — 93005 ELECTROCARDIOGRAM TRACING: CPT

## 2024-06-12 RX ORDER — NAPROXEN 500 MG/1
500 TABLET ORAL ONCE
Status: COMPLETED | OUTPATIENT
Start: 2024-06-12 | End: 2024-06-12

## 2024-06-12 RX ADMIN — NAPROXEN 500 MG: 500 TABLET ORAL at 18:08

## 2024-06-12 ASSESSMENT — ENCOUNTER SYMPTOMS
CHEST TIGHTNESS: 0
ABDOMINAL PAIN: 0
RHINORRHEA: 0
TROUBLE SWALLOWING: 0
SHORTNESS OF BREATH: 0
SINUS PAIN: 0
COUGH: 0
NAUSEA: 0
SINUS PRESSURE: 0
VOMITING: 0
SORE THROAT: 0
WHEEZING: 0

## 2024-06-12 ASSESSMENT — HEART SCORE: ECG: NON-SPECIFC REPOLARIZATION DISTURBANCE/LBTB/PM

## 2024-06-12 ASSESSMENT — PAIN - FUNCTIONAL ASSESSMENT: PAIN_FUNCTIONAL_ASSESSMENT: NONE - DENIES PAIN

## 2024-06-12 NOTE — DISCHARGE INSTRUCTIONS
Your EKG does not show any changes.  No traumatic injuries on CT imaging.  Chest x-ray is normal.  All of your blood work including 2 cardiac enzymes were negative.    I did have a chance to review your prior cardiac studies.  You recently had nuclear and stress testing which were reassuring and not concerning for any ischemic disease.  Heart score is low to moderate.  It is appropriate to follow-up as an outpatient with her cardiologist.  Call tomorrow to schedule emergency department follow-up.  Additionally follow-up with your primary care provider.    Please return if worse.

## 2024-06-12 NOTE — ED PROVIDER NOTES
Independently reviewed ECG completed for Dea Georges. I did not see this patient and was not involved in their care.     ECG  The Ekg interpreted by me shows  normal sinus rhythm with a rate of 63  Axis is   Normal  QTc is  within an acceptable range  Intervals and Durations are unremarkable.      ST Segments: no acute change  No significant change from prior EKG dated 5/17/24        Patrizia Minaya MD  06/15/24 6723    
management.  She is connected with cardiology and followed with Dr. Angel.  Recommended to contact Dr. Angel's office tomorrow to schedule prompt outpatient follow-up.  Additional follow-up with PCP.  Strict return precautions provided.  The patient is agreeable with plan.      Disposition Considerations (tests considered but not done, Admit vs D/C, Shared Decision Making, Pt Expectation of Test or Tx.): Appropriate for discharge with close PCP follow-up, prompt cardiac follow-up, strict return precautions    The patient tolerated their visit well.  The patient and / or the family were informed of the results of any tests, a time was given to answer questions, a plan was proposed and they agreed with plan.    I am the Primary Clinician of Record.  FINAL IMPRESSION      1. Atypical chest pain    2. Fall, initial encounter          DISPOSITION/PLAN     DISPOSITION Decision To Discharge 06/12/2024 06:08:41 PM      PATIENT REFERRED TO:  Evelio Angel,   [address] [place] [address] [id number] [phone]    Call in 1 day  Call to schedule emergency department follow-up    Dorota Bee MD  [address] [place] [address]   [place] [id number] [phone]    Schedule an appointment as soon as possible for a visit   Emergency department follow-up      DISCHARGE MEDICATIONS:  Discharge Medication List as of 6/12/2024  6:06 PM          DISCONTINUED MEDICATIONS:  Discharge Medication List as of 6/12/2024  6:06 PM        STOP taking these medications       ibuprofen (ADVIL;MOTRIN) 600 MG tablet Comments:   Reason for Stopping:         acetaminophen (TYLENOL) 500 MG tablet Comments:   Reason for Stopping:                      (Please note that portions of this note were completed with a voice recognition program.  Efforts were made to edit the dictations but occasionally words are mis-transcribed.)    ANABELLA Sherman CNP (electronically signed)      Kaylan Rutledge APRN - CNP  06/12/24 3826

## 2024-06-13 ENCOUNTER — TELEPHONE (OUTPATIENT)
Dept: CARDIOLOGY CLINIC | Age: 64
End: 2024-06-13

## 2024-06-13 LAB
EKG ATRIAL RATE: 63 BPM
EKG DIAGNOSIS: NORMAL
EKG P AXIS: 46 DEGREES
EKG P-R INTERVAL: 178 MS
EKG Q-T INTERVAL: 410 MS
EKG QRS DURATION: 88 MS
EKG QTC CALCULATION (BAZETT): 419 MS
EKG R AXIS: 50 DEGREES
EKG T AXIS: 65 DEGREES
EKG VENTRICULAR RATE: 63 BPM

## 2024-06-13 PROCEDURE — 93010 ELECTROCARDIOGRAM REPORT: CPT | Performed by: INTERNAL MEDICINE

## 2024-06-13 NOTE — TELEPHONE ENCOUNTER
----- Message from Evelio Angel DO sent at 6/13/2024 12:06 PM EDT -----  Yelena can you make sure this patient has follow-up with her cardiologist at [facility]? I would just call her.  This was forwarded to me by the ED (I am assuming because I saw my name in the chart ) and thought I was her cardiologist as I did a consult on her on 5/18/2024. Thanks.   ----- Message -----  From: Discharge Provider, Automatic  Sent: 6/13/2024   6:40 AM EDT  To: Evelio Angel DO       Render Note In Bullet Format When Appropriate: No Detail Level: Detailed Duration Of Freeze Thaw-Cycle (Seconds): 0 Show Applicator Variable?: Yes Consent: The patient's consent was obtained including but not limited to risks of crusting, scabbing, blistering, scarring, darker or lighter pigmentary change, recurrence, incomplete removal and infection. Post-Care Instructions: I reviewed with the patient in detail post-care instructions. Patient is to wear sunprotection, and avoid picking at any of the treated lesions. Pt may apply Vaseline to crusted or scabbing areas. Medical Necessity Clause: This procedure was medically necessary because the lesions that were treated were: Spray Paint Text: The liquid nitrogen was applied to the skin utilizing a spray paint frosting technique. Medical Necessity Information: It is in your best interest to select a reason for this procedure from the list below. All of these items fulfill various CMS LCD requirements except the new and changing color options.

## 2024-09-01 ENCOUNTER — APPOINTMENT (OUTPATIENT)
Dept: CT IMAGING | Age: 64
End: 2024-09-01
Payer: COMMERCIAL

## 2024-09-01 ENCOUNTER — HOSPITAL ENCOUNTER (EMERGENCY)
Age: 64
Discharge: HOME | End: 2024-09-01
Attending: STUDENT IN AN ORGANIZED HEALTH CARE EDUCATION/TRAINING PROGRAM
Payer: COMMERCIAL

## 2024-09-01 ENCOUNTER — APPOINTMENT (OUTPATIENT)
Dept: GENERAL RADIOLOGY | Age: 64
End: 2024-09-01
Payer: COMMERCIAL

## 2024-09-01 VITALS
RESPIRATION RATE: 16 BRPM | DIASTOLIC BLOOD PRESSURE: 66 MMHG | TEMPERATURE: 98.3 F | SYSTOLIC BLOOD PRESSURE: 103 MMHG | OXYGEN SATURATION: 97 % | HEART RATE: 64 BPM

## 2024-09-01 DIAGNOSIS — E87.20 LACTIC ACID ACIDOSIS: ICD-10-CM

## 2024-09-01 DIAGNOSIS — E87.6 HYPOKALEMIA: ICD-10-CM

## 2024-09-01 DIAGNOSIS — R56.9 SEIZURE (HCC): Primary | ICD-10-CM

## 2024-09-01 DIAGNOSIS — R41.82 ALTERED MENTAL STATUS, UNSPECIFIED ALTERED MENTAL STATUS TYPE: ICD-10-CM

## 2024-09-01 LAB
ALBUMIN SERPL-MCNC: 3.8 G/DL (ref 3.4–5)
ALBUMIN/GLOB SERPL: 1.5 {RATIO} (ref 1.1–2.2)
ALP SERPL-CCNC: 114 U/L (ref 40–129)
ALT SERPL-CCNC: 9 U/L (ref 10–40)
ANION GAP SERPL CALCULATED.3IONS-SCNC: 11 MMOL/L (ref 3–16)
AST SERPL-CCNC: 13 U/L (ref 15–37)
BASE EXCESS BLDV CALC-SCNC: -6.3 MMOL/L (ref -3–3)
BASOPHILS # BLD: 0 K/UL (ref 0–0.2)
BASOPHILS NFR BLD: 0.3 %
BILIRUB SERPL-MCNC: 0.3 MG/DL (ref 0–1)
BILIRUB UR QL STRIP.AUTO: NEGATIVE
BUN SERPL-MCNC: 14 MG/DL (ref 7–20)
CALCIUM SERPL-MCNC: 8.8 MG/DL (ref 8.3–10.6)
CHLORIDE SERPL-SCNC: 101 MMOL/L (ref 99–110)
CLARITY UR: ABNORMAL
CO2 BLDV-SCNC: 22 MMOL/L
CO2 SERPL-SCNC: 24 MMOL/L (ref 21–32)
COHGB MFR BLDV: 8.6 % (ref 0–1.5)
COLOR UR: ABNORMAL
CREAT SERPL-MCNC: 1.1 MG/DL (ref 0.6–1.2)
DEPRECATED RDW RBC AUTO: 13.6 % (ref 12.4–15.4)
EOSINOPHIL # BLD: 0.1 K/UL (ref 0–0.6)
EOSINOPHIL NFR BLD: 0.9 %
EPI CELLS #/AREA URNS HPF: ABNORMAL /HPF (ref 0–5)
GFR SERPLBLD CREATININE-BSD FMLA CKD-EPI: 56 ML/MIN/{1.73_M2}
GLUCOSE SERPL-MCNC: 147 MG/DL (ref 70–99)
GLUCOSE UR STRIP.AUTO-MCNC: NEGATIVE MG/DL
HCO3 BLDV-SCNC: 20.7 MMOL/L (ref 23–29)
HCT VFR BLD AUTO: 35.6 % (ref 36–48)
HGB BLD-MCNC: 12.1 G/DL (ref 12–16)
HGB UR QL STRIP.AUTO: NEGATIVE
KETONES UR STRIP.AUTO-MCNC: NEGATIVE MG/DL
LACTATE BLDV-SCNC: 1.2 MMOL/L (ref 0.4–1.9)
LACTATE BLDV-SCNC: 4.4 MMOL/L (ref 0.4–1.9)
LEUKOCYTE ESTERASE UR QL STRIP.AUTO: NEGATIVE
LITHIUM DOSE: ABNORMAL MG
LITHIUM SERPL-MCNC: <0.1 MMOL/L (ref 0.6–1.2)
LYMPHOCYTES # BLD: 2.2 K/UL (ref 1–5.1)
LYMPHOCYTES NFR BLD: 28.8 %
MAGNESIUM SERPL-MCNC: 1.9 MG/DL (ref 1.8–2.4)
MCH RBC QN AUTO: 34.2 PG (ref 26–34)
MCHC RBC AUTO-ENTMCNC: 33.9 G/DL (ref 31–36)
MCV RBC AUTO: 100.8 FL (ref 80–100)
METHGB MFR BLDV: 0.3 %
MONOCYTES # BLD: 0.7 K/UL (ref 0–1.3)
MONOCYTES NFR BLD: 9.4 %
MUCOUS THREADS #/AREA URNS LPF: ABNORMAL /LPF
NEUTROPHILS # BLD: 4.7 K/UL (ref 1.7–7.7)
NEUTROPHILS NFR BLD: 60.6 %
NITRITE UR QL STRIP.AUTO: NEGATIVE
NT-PROBNP SERPL-MCNC: 38 PG/ML (ref 0–124)
O2 CT VFR BLDV CALC: 16 VOL %
O2 THERAPY: ABNORMAL
PCO2 BLDV: 47.4 MMHG (ref 40–50)
PH BLDV: 7.26 [PH] (ref 7.35–7.45)
PH UR STRIP.AUTO: 6 [PH] (ref 5–8)
PLATELET # BLD AUTO: 219 K/UL (ref 135–450)
PMV BLD AUTO: 9.6 FL (ref 5–10.5)
PO2 BLDV: 97.6 MMHG (ref 25–40)
POTASSIUM SERPL-SCNC: 3.2 MMOL/L (ref 3.5–5.1)
PROT SERPL-MCNC: 6.4 G/DL (ref 6.4–8.2)
PROT UR STRIP.AUTO-MCNC: ABNORMAL MG/DL
RBC # BLD AUTO: 3.53 M/UL (ref 4–5.2)
RBC #/AREA URNS HPF: ABNORMAL /HPF (ref 0–4)
SAO2 % BLDV: 96 %
SODIUM SERPL-SCNC: 136 MMOL/L (ref 136–145)
SP GR UR STRIP.AUTO: >=1.03 (ref 1–1.03)
TROPONIN, HIGH SENSITIVITY: 14 NG/L (ref 0–14)
TROPONIN, HIGH SENSITIVITY: 9 NG/L (ref 0–14)
UA COMPLETE W REFLEX CULTURE PNL UR: ABNORMAL
UA DIPSTICK W REFLEX MICRO PNL UR: YES
URN SPEC COLLECT METH UR: ABNORMAL
UROBILINOGEN UR STRIP-ACNC: 0.2 E.U./DL
WBC # BLD AUTO: 7.8 K/UL (ref 4–11)
WBC #/AREA URNS HPF: ABNORMAL /HPF (ref 0–5)

## 2024-09-01 PROCEDURE — 93005 ELECTROCARDIOGRAM TRACING: CPT | Performed by: STUDENT IN AN ORGANIZED HEALTH CARE EDUCATION/TRAINING PROGRAM

## 2024-09-01 PROCEDURE — 85025 COMPLETE CBC W/AUTO DIFF WBC: CPT

## 2024-09-01 PROCEDURE — 96360 HYDRATION IV INFUSION INIT: CPT

## 2024-09-01 PROCEDURE — 82803 BLOOD GASES ANY COMBINATION: CPT

## 2024-09-01 PROCEDURE — 81001 URINALYSIS AUTO W/SCOPE: CPT

## 2024-09-01 PROCEDURE — 96361 HYDRATE IV INFUSION ADD-ON: CPT

## 2024-09-01 PROCEDURE — 99285 EMERGENCY DEPT VISIT HI MDM: CPT

## 2024-09-01 PROCEDURE — 83880 ASSAY OF NATRIURETIC PEPTIDE: CPT

## 2024-09-01 PROCEDURE — 80053 COMPREHEN METABOLIC PANEL: CPT

## 2024-09-01 PROCEDURE — 83605 ASSAY OF LACTIC ACID: CPT

## 2024-09-01 PROCEDURE — 84484 ASSAY OF TROPONIN QUANT: CPT

## 2024-09-01 PROCEDURE — 71045 X-RAY EXAM CHEST 1 VIEW: CPT

## 2024-09-01 PROCEDURE — 72125 CT NECK SPINE W/O DYE: CPT

## 2024-09-01 PROCEDURE — 80178 ASSAY OF LITHIUM: CPT

## 2024-09-01 PROCEDURE — 83735 ASSAY OF MAGNESIUM: CPT

## 2024-09-01 PROCEDURE — 2580000003 HC RX 258: Performed by: STUDENT IN AN ORGANIZED HEALTH CARE EDUCATION/TRAINING PROGRAM

## 2024-09-01 PROCEDURE — 70450 CT HEAD/BRAIN W/O DYE: CPT

## 2024-09-01 RX ORDER — SODIUM CHLORIDE, SODIUM LACTATE, POTASSIUM CHLORIDE, AND CALCIUM CHLORIDE .6; .31; .03; .02 G/100ML; G/100ML; G/100ML; G/100ML
1000 INJECTION, SOLUTION INTRAVENOUS ONCE
Status: COMPLETED | OUTPATIENT
Start: 2024-09-01 | End: 2024-09-01

## 2024-09-01 RX ORDER — POTASSIUM CHLORIDE 1500 MG/1
20 TABLET, EXTENDED RELEASE ORAL 2 TIMES DAILY
Qty: 10 TABLET | Refills: 0 | Status: SHIPPED | OUTPATIENT
Start: 2024-09-01 | End: 2024-09-06

## 2024-09-01 RX ADMIN — SODIUM CHLORIDE, POTASSIUM CHLORIDE, SODIUM LACTATE AND CALCIUM CHLORIDE 1000 ML: 600; 310; 30; 20 INJECTION, SOLUTION INTRAVENOUS at 17:04

## 2024-09-01 RX ADMIN — SODIUM CHLORIDE, POTASSIUM CHLORIDE, SODIUM LACTATE AND CALCIUM CHLORIDE 1000 ML: 600; 310; 30; 20 INJECTION, SOLUTION INTRAVENOUS at 15:06

## 2024-09-01 ASSESSMENT — PAIN - FUNCTIONAL ASSESSMENT: PAIN_FUNCTIONAL_ASSESSMENT: ADULT NONVERBAL PAIN SCALE (NPVS)

## 2024-09-01 NOTE — ED NOTES
MD @ bedside due to pt being responsive to painful stimuli only. Nasal trumpet placed. Pt placed in sz precautions

## 2024-09-01 NOTE — ED NOTES
Reviewed discharge instructions with patient. Patient verbalized understanding. No distress noted. Ambulatory from department.

## 2024-09-01 NOTE — ED PROVIDER NOTES
[facility] ED     EMERGENCY DEPARTMENT ENCOUNTER         Pt Name: Dea Georges   MRN: [id number]   Birthdate 1960   Date of evaluation: 9/1/2024   Provider: Gabriel Persaud MD   PCP: Dorota Bee MD   Note Started: 3:35 PM EDT 9/1/24       Chief Complaint     Seizures (Pt arrives via EMS with poss seizures. Pt alert and oriented, GCS 15 en route. Pt suddenly started convulsing en route. Versed given. )      History of Present Illness     Dea Georges is a 63 y.o. female who presents via EMS with concern for seizures.  The patient presents from home unclear who called EMS but patient reportedly suffered a seizure at home.  There is report of a possible seizure history.  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    When EMS arrived the patient was perhaps slightly confused though her mental status improved by the time she had been loaded into the ambulance.  She was found with adequate blood sugar and hemodynamically stable however during transport the patient seemed to demonstrate some type of seizure activity and therefore was given 5 mg of Versed IV.  On arrival here the patient is now obtunded and cannot provide a history of present illness.  She is receiving ventilatory support via bag mask ventilation and with snoring respirations.    I have reviewed the nursing notes and agree unless otherwise noted.    Review of Systems     Positives and pertinent negatives as per HPI.    Past Medical, Surgical, Family, and Social History     She has a past medical history of Arthritis, Asthma, CAD in native artery, and Hypertension.  She has a past surgical history that includes

## 2024-09-02 LAB
EKG ATRIAL RATE: 69 BPM
EKG DIAGNOSIS: NORMAL
EKG P AXIS: 48 DEGREES
EKG P-R INTERVAL: 198 MS
EKG Q-T INTERVAL: 430 MS
EKG QRS DURATION: 102 MS
EKG QTC CALCULATION (BAZETT): 460 MS
EKG R AXIS: 62 DEGREES
EKG T AXIS: 68 DEGREES
EKG VENTRICULAR RATE: 69 BPM

## 2024-09-02 PROCEDURE — 93010 ELECTROCARDIOGRAM REPORT: CPT | Performed by: INTERNAL MEDICINE

## 2024-09-02 NOTE — ED PROVIDER NOTES
Patient had some seizure-like activity today.  History of PNES.  Lactic acidosis on arrival here which has cleared.  Patient is awake and alert and oriented x 3.  Neuro intact.  Patient is feeling well and would like to be discharged in the care of her son.  He is at the bedside.  Patient has no complaints to me.  Patient also has some hypokalemia.  Prescription given for the same.    Results for orders placed or performed during the hospital encounter of 09/01/24   CBC with Auto Differential   Result Value Ref Range    WBC 7.8 4.0 - 11.0 K/uL    RBC 3.53 (L) 4.00 - 5.20 M/uL    Hemoglobin 12.1 12.0 - 16.0 g/dL    Hematocrit 35.6 (L) 36.0 - 48.0 %    .8 (H) 80.0 - 100.0 fL    MCH 34.2 (H) 26.0 - 34.0 pg    MCHC 33.9 31.0 - 36.0 g/dL    RDW 13.6 12.4 - 15.4 %    Platelets 219 135 - 450 K/uL    MPV 9.6 5.0 - 10.5 fL    Neutrophils % 60.6 %    Lymphocytes % 28.8 %    Monocytes % 9.4 %    Eosinophils % 0.9 %    Basophils % 0.3 %    Neutrophils Absolute 4.7 1.7 - 7.7 K/uL    Lymphocytes Absolute 2.2 1.0 - 5.1 K/uL    Monocytes Absolute 0.7 0.0 - 1.3 K/uL    Eosinophils Absolute 0.1 0.0 - 0.6 K/uL    Basophils Absolute 0.0 0.0 - 0.2 K/uL   CMP w/ Reflex to MG   Result Value Ref Range    Sodium 136 136 - 145 mmol/L    Potassium reflex Magnesium 3.2 (L) 3.5 - 5.1 mmol/L    Chloride 101 99 - 110 mmol/L    CO2 24 21 - 32 mmol/L    Anion Gap 11 3 - 16    Glucose 147 (H) 70 - 99 mg/dL    BUN 14 7 - 20 mg/dL    Creatinine 1.1 0.6 - 1.2 mg/dL    Est, Glom Filt Rate 56 (A) >60    Calcium 8.8 8.3 - 10.6 mg/dL    Total Protein 6.4 6.4 - 8.2 g/dL    Albumin 3.8 3.4 - 5.0 g/dL    Albumin/Globulin Ratio 1.5 1.1 - 2.2    Total Bilirubin 0.3 0.0 - 1.0 mg/dL    Alkaline Phosphatase 114 40 - 129 U/L    ALT 9 (L) 10 - 40 U/L    AST 13 (L) 15 - 37 U/L   Lactate, Sepsis   Result Value Ref Range    Lactic Acid, Sepsis 4.4 (HH) 0.4 - 1.9 mmol/L   Lactate, Sepsis   Result Value Ref Range    Lactic Acid, Sepsis 1.2 0.4 - 1.9 mmol/L   Blood

## 2024-09-20 ENCOUNTER — HOSPITAL ENCOUNTER (EMERGENCY)
Age: 64
Discharge: HOME | End: 2024-09-20
Attending: STUDENT IN AN ORGANIZED HEALTH CARE EDUCATION/TRAINING PROGRAM
Payer: COMMERCIAL

## 2024-09-20 ENCOUNTER — APPOINTMENT (OUTPATIENT)
Dept: CT IMAGING | Age: 64
End: 2024-09-20
Payer: COMMERCIAL

## 2024-09-20 ENCOUNTER — APPOINTMENT (OUTPATIENT)
Dept: GENERAL RADIOLOGY | Age: 64
End: 2024-09-20
Payer: COMMERCIAL

## 2024-09-20 VITALS
DIASTOLIC BLOOD PRESSURE: 78 MMHG | TEMPERATURE: 99.3 F | SYSTOLIC BLOOD PRESSURE: 102 MMHG | RESPIRATION RATE: 22 BRPM | HEART RATE: 68 BPM | OXYGEN SATURATION: 94 %

## 2024-09-20 DIAGNOSIS — R10.13 ABDOMINAL PAIN, EPIGASTRIC: Primary | ICD-10-CM

## 2024-09-20 DIAGNOSIS — K21.00 GASTROESOPHAGEAL REFLUX DISEASE WITH ESOPHAGITIS, UNSPECIFIED WHETHER HEMORRHAGE: ICD-10-CM

## 2024-09-20 LAB
ABO + RH BLD: NORMAL
ALBUMIN SERPL-MCNC: 3.5 G/DL (ref 3.4–5)
ALBUMIN/GLOB SERPL: 1.5 {RATIO} (ref 1.1–2.2)
ALP SERPL-CCNC: 116 U/L (ref 40–129)
ALT SERPL-CCNC: 10 U/L (ref 10–40)
ANION GAP SERPL CALCULATED.3IONS-SCNC: 8 MMOL/L (ref 3–16)
AST SERPL-CCNC: 12 U/L (ref 15–37)
BACTERIA URNS QL MICRO: ABNORMAL /HPF
BASE EXCESS BLDV CALC-SCNC: -0.7 MMOL/L (ref -3–3)
BASOPHILS # BLD: 0 K/UL (ref 0–0.2)
BASOPHILS NFR BLD: 0.2 %
BILIRUB SERPL-MCNC: 0.3 MG/DL (ref 0–1)
BILIRUB UR QL STRIP.AUTO: NEGATIVE
BLD GP AB SCN SERPL QL: NORMAL
BUN SERPL-MCNC: 10 MG/DL (ref 7–20)
CALCIUM SERPL-MCNC: 9 MG/DL (ref 8.3–10.6)
CHLORIDE SERPL-SCNC: 102 MMOL/L (ref 99–110)
CLARITY UR: CLEAR
CO2 BLDV-SCNC: 27 MMOL/L
CO2 SERPL-SCNC: 28 MMOL/L (ref 21–32)
COHGB MFR BLDV: 5.9 % (ref 0–1.5)
COLOR UR: YELLOW
CREAT SERPL-MCNC: 1 MG/DL (ref 0.6–1.2)
DEPRECATED RDW RBC AUTO: 13.6 % (ref 12.4–15.4)
EOSINOPHIL # BLD: 0 K/UL (ref 0–0.6)
EOSINOPHIL NFR BLD: 0.2 %
EPI CELLS #/AREA URNS HPF: ABNORMAL /HPF (ref 0–5)
ETHANOLAMINE SERPL-MCNC: NORMAL MG/DL (ref 0–0.08)
FLUAV RNA RESP QL NAA+PROBE: NOT DETECTED
FLUBV RNA RESP QL NAA+PROBE: NOT DETECTED
GFR SERPLBLD CREATININE-BSD FMLA CKD-EPI: 63 ML/MIN/{1.73_M2}
GLUCOSE SERPL-MCNC: 103 MG/DL (ref 70–99)
GLUCOSE UR STRIP.AUTO-MCNC: NEGATIVE MG/DL
HCO3 BLDV-SCNC: 25.6 MMOL/L (ref 23–29)
HCT VFR BLD AUTO: 38.6 % (ref 36–48)
HGB BLD-MCNC: 12.9 G/DL (ref 12–16)
HGB UR QL STRIP.AUTO: ABNORMAL
INR PPP: 0.97 (ref 0.85–1.15)
KETONES UR STRIP.AUTO-MCNC: NEGATIVE MG/DL
LEUKOCYTE ESTERASE UR QL STRIP.AUTO: NEGATIVE
LIPASE SERPL-CCNC: 15 U/L (ref 13–60)
LYMPHOCYTES # BLD: 1.2 K/UL (ref 1–5.1)
LYMPHOCYTES NFR BLD: 14.1 %
MAGNESIUM SERPL-MCNC: 2.1 MG/DL (ref 1.8–2.4)
MCH RBC QN AUTO: 33.6 PG (ref 26–34)
MCHC RBC AUTO-ENTMCNC: 33.5 G/DL (ref 31–36)
MCV RBC AUTO: 100.2 FL (ref 80–100)
METHGB MFR BLDV: 0.3 %
MONOCYTES # BLD: 0.6 K/UL (ref 0–1.3)
MONOCYTES NFR BLD: 6.7 %
MUCOUS THREADS #/AREA URNS LPF: ABNORMAL /LPF
NEUTROPHILS # BLD: 6.9 K/UL (ref 1.7–7.7)
NEUTROPHILS NFR BLD: 78.8 %
NITRITE UR QL STRIP.AUTO: NEGATIVE
O2 THERAPY: ABNORMAL
PCO2 BLDV: 48.6 MMHG (ref 40–50)
PH BLDV: 7.34 [PH] (ref 7.35–7.45)
PH UR STRIP.AUTO: 6 [PH] (ref 5–8)
PLATELET # BLD AUTO: 205 K/UL (ref 135–450)
PMV BLD AUTO: 9.1 FL (ref 5–10.5)
PO2 BLDV: 29.5 MMHG (ref 25–40)
POTASSIUM SERPL-SCNC: 3.5 MMOL/L (ref 3.5–5.1)
PROT SERPL-MCNC: 5.9 G/DL (ref 6.4–8.2)
PROT UR STRIP.AUTO-MCNC: NEGATIVE MG/DL
PROTHROMBIN TIME: 13.1 SEC (ref 11.9–14.9)
RBC # BLD AUTO: 3.85 M/UL (ref 4–5.2)
RBC #/AREA URNS HPF: ABNORMAL /HPF (ref 0–4)
SAO2 % BLDV: 52 %
SARS-COV-2 RNA RESP QL NAA+PROBE: NOT DETECTED
SODIUM SERPL-SCNC: 138 MMOL/L (ref 136–145)
SP GR UR STRIP.AUTO: >=1.03 (ref 1–1.03)
TROPONIN, HIGH SENSITIVITY: 9 NG/L (ref 0–14)
UA COMPLETE W REFLEX CULTURE PNL UR: ABNORMAL
UA DIPSTICK W REFLEX MICRO PNL UR: YES
URN SPEC COLLECT METH UR: ABNORMAL
UROBILINOGEN UR STRIP-ACNC: 0.2 E.U./DL
WBC # BLD AUTO: 8.8 K/UL (ref 4–11)
WBC #/AREA URNS HPF: ABNORMAL /HPF (ref 0–5)

## 2024-09-20 PROCEDURE — 86900 BLOOD TYPING SEROLOGIC ABO: CPT

## 2024-09-20 PROCEDURE — 99285 EMERGENCY DEPT VISIT HI MDM: CPT

## 2024-09-20 PROCEDURE — 82803 BLOOD GASES ANY COMBINATION: CPT

## 2024-09-20 PROCEDURE — 86850 RBC ANTIBODY SCREEN: CPT

## 2024-09-20 PROCEDURE — 85610 PROTHROMBIN TIME: CPT

## 2024-09-20 PROCEDURE — 2580000003 HC RX 258: Performed by: STUDENT IN AN ORGANIZED HEALTH CARE EDUCATION/TRAINING PROGRAM

## 2024-09-20 PROCEDURE — 6360000004 HC RX CONTRAST MEDICATION: Performed by: STUDENT IN AN ORGANIZED HEALTH CARE EDUCATION/TRAINING PROGRAM

## 2024-09-20 PROCEDURE — 71045 X-RAY EXAM CHEST 1 VIEW: CPT

## 2024-09-20 PROCEDURE — 96374 THER/PROPH/DIAG INJ IV PUSH: CPT

## 2024-09-20 PROCEDURE — 81001 URINALYSIS AUTO W/SCOPE: CPT

## 2024-09-20 PROCEDURE — 96375 TX/PRO/DX INJ NEW DRUG ADDON: CPT

## 2024-09-20 PROCEDURE — 86901 BLOOD TYPING SEROLOGIC RH(D): CPT

## 2024-09-20 PROCEDURE — 80053 COMPREHEN METABOLIC PANEL: CPT

## 2024-09-20 PROCEDURE — 93005 ELECTROCARDIOGRAM TRACING: CPT | Performed by: STUDENT IN AN ORGANIZED HEALTH CARE EDUCATION/TRAINING PROGRAM

## 2024-09-20 PROCEDURE — 85025 COMPLETE CBC W/AUTO DIFF WBC: CPT

## 2024-09-20 PROCEDURE — 2500000003 HC RX 250 WO HCPCS: Performed by: STUDENT IN AN ORGANIZED HEALTH CARE EDUCATION/TRAINING PROGRAM

## 2024-09-20 PROCEDURE — 36415 COLL VENOUS BLD VENIPUNCTURE: CPT

## 2024-09-20 PROCEDURE — 83735 ASSAY OF MAGNESIUM: CPT

## 2024-09-20 PROCEDURE — 82077 ASSAY SPEC XCP UR&BREATH IA: CPT

## 2024-09-20 PROCEDURE — 6360000002 HC RX W HCPCS: Performed by: STUDENT IN AN ORGANIZED HEALTH CARE EDUCATION/TRAINING PROGRAM

## 2024-09-20 PROCEDURE — 83690 ASSAY OF LIPASE: CPT

## 2024-09-20 PROCEDURE — 6370000000 HC RX 637 (ALT 250 FOR IP): Performed by: STUDENT IN AN ORGANIZED HEALTH CARE EDUCATION/TRAINING PROGRAM

## 2024-09-20 PROCEDURE — 74174 CTA ABD&PLVS W/CONTRAST: CPT

## 2024-09-20 PROCEDURE — 84484 ASSAY OF TROPONIN QUANT: CPT

## 2024-09-20 PROCEDURE — 87636 SARSCOV2 & INF A&B AMP PRB: CPT

## 2024-09-20 RX ORDER — IOPAMIDOL 755 MG/ML
75 INJECTION, SOLUTION INTRAVASCULAR
Status: COMPLETED | OUTPATIENT
Start: 2024-09-20 | End: 2024-09-20

## 2024-09-20 RX ORDER — PANTOPRAZOLE SODIUM 40 MG/10ML
40 INJECTION, POWDER, LYOPHILIZED, FOR SOLUTION INTRAVENOUS ONCE
Status: COMPLETED | OUTPATIENT
Start: 2024-09-20 | End: 2024-09-20

## 2024-09-20 RX ORDER — ONDANSETRON 2 MG/ML
4 INJECTION INTRAMUSCULAR; INTRAVENOUS ONCE
Status: COMPLETED | OUTPATIENT
Start: 2024-09-20 | End: 2024-09-20

## 2024-09-20 RX ORDER — PANTOPRAZOLE SODIUM 40 MG/1
40 TABLET, DELAYED RELEASE ORAL 2 TIMES DAILY
Qty: 60 TABLET | Refills: 0 | Status: SHIPPED | OUTPATIENT
Start: 2024-09-20 | End: 2024-10-20

## 2024-09-20 RX ORDER — SUCRALFATE 1 G/1
1 TABLET ORAL 4 TIMES DAILY
Qty: 120 TABLET | Refills: 3 | Status: SHIPPED | OUTPATIENT
Start: 2024-09-20

## 2024-09-20 RX ADMIN — IOPAMIDOL 85 ML: 755 INJECTION, SOLUTION INTRAVENOUS at 21:07

## 2024-09-20 RX ADMIN — ALUMINUM HYDROXIDE, MAGNESIUM HYDROXIDE, AND SIMETHICONE: 1200; 120; 1200 SUSPENSION ORAL at 22:48

## 2024-09-20 RX ADMIN — FAMOTIDINE 20 MG: 10 INJECTION, SOLUTION INTRAVENOUS at 20:34

## 2024-09-20 RX ADMIN — PANTOPRAZOLE SODIUM 40 MG: 40 INJECTION, POWDER, FOR SOLUTION INTRAVENOUS at 20:34

## 2024-09-20 RX ADMIN — ONDANSETRON 4 MG: 2 INJECTION INTRAMUSCULAR; INTRAVENOUS at 20:35

## 2024-09-20 ASSESSMENT — PAIN - FUNCTIONAL ASSESSMENT
PAIN_FUNCTIONAL_ASSESSMENT: NONE - DENIES PAIN
PAIN_FUNCTIONAL_ASSESSMENT: 0-10

## 2024-09-20 ASSESSMENT — HEART SCORE: ECG: NORMAL

## 2024-09-20 ASSESSMENT — PAIN DESCRIPTION - LOCATION: LOCATION: ABDOMEN

## 2024-09-20 ASSESSMENT — PAIN SCALES - GENERAL: PAINLEVEL_OUTOF10: 8

## 2024-09-21 LAB
EKG ATRIAL RATE: 65 BPM
EKG DIAGNOSIS: NORMAL
EKG P AXIS: 55 DEGREES
EKG P-R INTERVAL: 168 MS
EKG Q-T INTERVAL: 378 MS
EKG QRS DURATION: 96 MS
EKG QTC CALCULATION (BAZETT): 393 MS
EKG R AXIS: 59 DEGREES
EKG T AXIS: 67 DEGREES
EKG VENTRICULAR RATE: 65 BPM

## 2024-09-21 PROCEDURE — 93010 ELECTROCARDIOGRAM REPORT: CPT | Performed by: INTERNAL MEDICINE

## 2024-09-25 RX ORDER — RANOLAZINE 1000 MG/1
1000 TABLET, EXTENDED RELEASE ORAL 2 TIMES DAILY
Qty: 180 TABLET | OUTPATIENT
Start: 2024-09-25

## 2024-10-29 RX ORDER — RANOLAZINE 1000 MG/1
1000 TABLET, EXTENDED RELEASE ORAL 2 TIMES DAILY
Qty: 180 TABLET | OUTPATIENT
Start: 2024-10-29

## 2025-02-23 ENCOUNTER — HOSPITAL ENCOUNTER (EMERGENCY)
Age: 65
Discharge: HOME | End: 2025-02-23
Payer: COMMERCIAL

## 2025-02-23 ENCOUNTER — APPOINTMENT (OUTPATIENT)
Dept: GENERAL RADIOLOGY | Age: 65
End: 2025-02-23
Payer: COMMERCIAL

## 2025-02-23 VITALS
DIASTOLIC BLOOD PRESSURE: 83 MMHG | WEIGHT: 191 LBS | BODY MASS INDEX: 31.82 KG/M2 | SYSTOLIC BLOOD PRESSURE: 140 MMHG | HEART RATE: 82 BPM | OXYGEN SATURATION: 90 % | TEMPERATURE: 97.9 F | HEIGHT: 65 IN | RESPIRATION RATE: 25 BRPM

## 2025-02-23 DIAGNOSIS — R11.0 NAUSEA: Primary | ICD-10-CM

## 2025-02-23 DIAGNOSIS — B34.9 VIRAL SYNDROME: ICD-10-CM

## 2025-02-23 DIAGNOSIS — J18.9 COMMUNITY ACQUIRED PNEUMONIA OF RIGHT LUNG, UNSPECIFIED PART OF LUNG: ICD-10-CM

## 2025-02-23 LAB
ANION GAP SERPL CALCULATED.3IONS-SCNC: 12 MMOL/L (ref 3–16)
BASOPHILS # BLD: 0 K/UL (ref 0–0.2)
BASOPHILS NFR BLD: 0.2 %
BUN SERPL-MCNC: 6 MG/DL (ref 7–20)
CALCIUM SERPL-MCNC: 9.1 MG/DL (ref 8.3–10.6)
CHLORIDE SERPL-SCNC: 99 MMOL/L (ref 99–110)
CO2 SERPL-SCNC: 26 MMOL/L (ref 21–32)
CREAT SERPL-MCNC: 1 MG/DL (ref 0.6–1.2)
DEPRECATED RDW RBC AUTO: 14.7 % (ref 12.4–15.4)
EOSINOPHIL # BLD: 0 K/UL (ref 0–0.6)
EOSINOPHIL NFR BLD: 0.1 %
FLUAV RNA RESP QL NAA+PROBE: NOT DETECTED
FLUBV RNA RESP QL NAA+PROBE: NOT DETECTED
GFR SERPLBLD CREATININE-BSD FMLA CKD-EPI: 62 ML/MIN/{1.73_M2}
GLUCOSE SERPL-MCNC: 132 MG/DL (ref 70–99)
HCT VFR BLD AUTO: 43.4 % (ref 36–48)
HGB BLD-MCNC: 14.6 G/DL (ref 12–16)
LYMPHOCYTES # BLD: 0.4 K/UL (ref 1–5.1)
LYMPHOCYTES NFR BLD: 5.4 %
MCH RBC QN AUTO: 31.3 PG (ref 26–34)
MCHC RBC AUTO-ENTMCNC: 33.6 G/DL (ref 31–36)
MCV RBC AUTO: 93 FL (ref 80–100)
MONOCYTES # BLD: 0.5 K/UL (ref 0–1.3)
MONOCYTES NFR BLD: 6.2 %
NEUTROPHILS # BLD: 7.1 K/UL (ref 1.7–7.7)
NEUTROPHILS NFR BLD: 88.1 %
PLATELET # BLD AUTO: 199 K/UL (ref 135–450)
PMV BLD AUTO: 9.2 FL (ref 5–10.5)
POTASSIUM SERPL-SCNC: 3.6 MMOL/L (ref 3.5–5.1)
RBC # BLD AUTO: 4.66 M/UL (ref 4–5.2)
SARS-COV-2 RNA RESP QL NAA+PROBE: NOT DETECTED
SODIUM SERPL-SCNC: 137 MMOL/L (ref 136–145)
WBC # BLD AUTO: 8 K/UL (ref 4–11)

## 2025-02-23 PROCEDURE — 85025 COMPLETE CBC W/AUTO DIFF WBC: CPT

## 2025-02-23 PROCEDURE — 6370000000 HC RX 637 (ALT 250 FOR IP)

## 2025-02-23 PROCEDURE — 80048 BASIC METABOLIC PNL TOTAL CA: CPT

## 2025-02-23 PROCEDURE — 96374 THER/PROPH/DIAG INJ IV PUSH: CPT

## 2025-02-23 PROCEDURE — 99284 EMERGENCY DEPT VISIT MOD MDM: CPT

## 2025-02-23 PROCEDURE — 96375 TX/PRO/DX INJ NEW DRUG ADDON: CPT

## 2025-02-23 PROCEDURE — 6360000002 HC RX W HCPCS

## 2025-02-23 PROCEDURE — 87636 SARSCOV2 & INF A&B AMP PRB: CPT

## 2025-02-23 PROCEDURE — 36415 COLL VENOUS BLD VENIPUNCTURE: CPT

## 2025-02-23 PROCEDURE — 71045 X-RAY EXAM CHEST 1 VIEW: CPT

## 2025-02-23 RX ORDER — ONDANSETRON 4 MG/1
4 TABLET, ORALLY DISINTEGRATING ORAL ONCE
Status: COMPLETED | OUTPATIENT
Start: 2025-02-23 | End: 2025-02-23

## 2025-02-23 RX ORDER — ONDANSETRON 2 MG/ML
4 INJECTION INTRAMUSCULAR; INTRAVENOUS ONCE
Status: COMPLETED | OUTPATIENT
Start: 2025-02-23 | End: 2025-02-23

## 2025-02-23 RX ORDER — KETOROLAC TROMETHAMINE 30 MG/ML
15 INJECTION, SOLUTION INTRAMUSCULAR; INTRAVENOUS ONCE
Status: COMPLETED | OUTPATIENT
Start: 2025-02-23 | End: 2025-02-23

## 2025-02-23 RX ORDER — AZITHROMYCIN 250 MG/1
500 TABLET, FILM COATED ORAL ONCE
Status: COMPLETED | OUTPATIENT
Start: 2025-02-23 | End: 2025-02-23

## 2025-02-23 RX ORDER — AZITHROMYCIN 500 MG/1
500 TABLET, FILM COATED ORAL DAILY
Qty: 3 TABLET | Refills: 0 | Status: SHIPPED | OUTPATIENT
Start: 2025-02-23 | End: 2025-02-26

## 2025-02-23 RX ORDER — ONDANSETRON 4 MG/1
4 TABLET, ORALLY DISINTEGRATING ORAL 3 TIMES DAILY PRN
Qty: 21 TABLET | Refills: 0 | Status: SHIPPED | OUTPATIENT
Start: 2025-02-23

## 2025-02-23 RX ADMIN — AZITHROMYCIN 500 MG: 250 TABLET, FILM COATED ORAL at 22:52

## 2025-02-23 RX ADMIN — KETOROLAC TROMETHAMINE 15 MG: 30 INJECTION, SOLUTION INTRAMUSCULAR at 21:50

## 2025-02-23 RX ADMIN — ONDANSETRON 4 MG: 2 INJECTION, SOLUTION INTRAMUSCULAR; INTRAVENOUS at 22:29

## 2025-02-23 RX ADMIN — ONDANSETRON 4 MG: 4 TABLET, ORALLY DISINTEGRATING ORAL at 20:50

## 2025-02-23 ASSESSMENT — PAIN DESCRIPTION - DESCRIPTORS: DESCRIPTORS: ACHING

## 2025-02-23 ASSESSMENT — PAIN SCALES - GENERAL
PAINLEVEL_OUTOF10: 3
PAINLEVEL_OUTOF10: 10
PAINLEVEL_OUTOF10: 5

## 2025-02-23 ASSESSMENT — PAIN - FUNCTIONAL ASSESSMENT: PAIN_FUNCTIONAL_ASSESSMENT: 0-10

## 2025-02-23 ASSESSMENT — PAIN DESCRIPTION - LOCATION: LOCATION: HEAD

## 2025-02-23 ASSESSMENT — PAIN DESCRIPTION - PAIN TYPE: TYPE: ACUTE PAIN

## 2025-02-24 NOTE — ED NOTES
Patient states \"I am having the worst headache of my life.\" STEFANIE Gonzales aware and in to reassess patient. Patient denies any other interventions for headache and states she wants to go home and sleep.

## 2025-02-24 NOTE — ED PROVIDER NOTES
[facility] EMERGENCY DEPARTMENT  EMERGENCY DEPARTMENT ENCOUNTER        Pt Name: Dea Georges  MRN: [id number]  Birthdate 1960  Date of evaluation: 2/23/2025  Provider: STEFANIE Martin Jr  PCP: Dorota Bee MD  Note Started: 1:41 AM EST 2/24/25      ALEENA. I have evaluated this patient.        CHIEF COMPLAINT       Chief Complaint   Patient presents with    Nausea     Patient woke up this morning with nausea, body aches and headache. Denies being around anyone sick.        HISTORY OF PRESENT ILLNESS: 1 or more Elements     History from : Patient    Limitations to history : None    Dea Georges is a 64 y.o. female who presents with reports of nausea that started this morning.  She is denying any chest pain or shortness of breath on my evaluation.  She is not experiencing any abdominal pain or diarrhea.  She is unsure of any recent sick contacts.  Although she has been nauseous for most of the day she has not vomited.  She has had a dry nonproductive cough that started today as well.  She has no other complaints.  Review of systems otherwise negative.    Nursing Notes were all reviewed and agreed with or any disagreements were addressed in the HPI.      SURGICAL HISTORY     Past Surgical History:   Procedure Laterality Date    APPENDECTOMY      CORONARY ANGIOPLASTY WITH STENT PLACEMENT Left 04/2018    LIPOMA RESECTION Right     [place] LAPAROSCOPY SURG CHOLECYSTECTOMY N/A 2/24/2018    CHOLECYSTECTOMY LAPAROSCOPIC ROBOTIC performed by Joshua Johnson MD at [facility] OR       [facility]       Discharge Medication List as of 2/23/2025 11:00 PM        CONTINUE these medications which have NOT CHANGED    Details   pantoprazole (PROTONIX) 40 MG tablet Take 1 tablet by mouth in the morning and at bedtime, Disp-60 tablet, R-0Normal      sucralfate (CARAFATE) 1 GM tablet Take 1 tablet by mouth 4 times daily, Disp-120 tablet, R-3Normal      potassium chloride (KLOR-CON M) 20 MEQ extended

## 2025-03-06 ENCOUNTER — HOSPITAL ENCOUNTER (EMERGENCY)
Age: 65
Discharge: HOME | End: 2025-03-06
Attending: EMERGENCY MEDICINE
Payer: MEDICARE

## 2025-03-06 ENCOUNTER — APPOINTMENT (OUTPATIENT)
Dept: CT IMAGING | Age: 65
End: 2025-03-06
Payer: MEDICARE

## 2025-03-06 VITALS
HEART RATE: 80 BPM | RESPIRATION RATE: 20 BRPM | DIASTOLIC BLOOD PRESSURE: 95 MMHG | TEMPERATURE: 98.6 F | SYSTOLIC BLOOD PRESSURE: 145 MMHG | OXYGEN SATURATION: 99 %

## 2025-03-06 DIAGNOSIS — R05.2 SUBACUTE COUGH: ICD-10-CM

## 2025-03-06 DIAGNOSIS — R06.00 DYSPNEA, UNSPECIFIED TYPE: Primary | ICD-10-CM

## 2025-03-06 LAB
ALBUMIN SERPL-MCNC: 3.2 G/DL (ref 3.4–5)
ALBUMIN/GLOB SERPL: 0.9 {RATIO} (ref 1.1–2.2)
ALP SERPL-CCNC: 128 U/L (ref 40–129)
ALT SERPL-CCNC: 11 U/L (ref 10–40)
ANION GAP SERPL CALCULATED.3IONS-SCNC: 9 MMOL/L (ref 3–16)
AST SERPL-CCNC: 20 U/L (ref 15–37)
BASE EXCESS BLDV CALC-SCNC: 0.1 MMOL/L (ref -3–3)
BASOPHILS # BLD: 0 K/UL (ref 0–0.2)
BASOPHILS NFR BLD: 0.8 %
BILIRUB SERPL-MCNC: <0.2 MG/DL (ref 0–1)
BUN SERPL-MCNC: 11 MG/DL (ref 7–20)
CALCIUM SERPL-MCNC: 8.8 MG/DL (ref 8.3–10.6)
CHLORIDE SERPL-SCNC: 105 MMOL/L (ref 99–110)
CO2 BLDV-SCNC: 26 MMOL/L
CO2 SERPL-SCNC: 28 MMOL/L (ref 21–32)
COHGB MFR BLDV: 1.7 % (ref 0–1.5)
CREAT SERPL-MCNC: 1.1 MG/DL (ref 0.6–1.2)
DEPRECATED RDW RBC AUTO: 15 % (ref 12.4–15.4)
EOSINOPHIL # BLD: 0 K/UL (ref 0–0.6)
EOSINOPHIL NFR BLD: 0.9 %
FLUAV RNA RESP QL NAA+PROBE: NOT DETECTED
FLUBV RNA RESP QL NAA+PROBE: NOT DETECTED
GFR SERPLBLD CREATININE-BSD FMLA CKD-EPI: 56 ML/MIN/{1.73_M2}
GLUCOSE SERPL-MCNC: 130 MG/DL (ref 70–99)
HCO3 BLDV-SCNC: 24.7 MMOL/L (ref 23–29)
HCT VFR BLD AUTO: 44.6 % (ref 36–48)
HGB BLD-MCNC: 14.9 G/DL (ref 12–16)
LACTATE BLDV-SCNC: 1.9 MMOL/L (ref 0.4–1.9)
LACTATE BLDV-SCNC: 2 MMOL/L (ref 0.4–1.9)
LYMPHOCYTES # BLD: 1.8 K/UL (ref 1–5.1)
LYMPHOCYTES NFR BLD: 35.3 %
MAGNESIUM SERPL-MCNC: 1.69 MG/DL (ref 1.8–2.4)
MCH RBC QN AUTO: 31.1 PG (ref 26–34)
MCHC RBC AUTO-ENTMCNC: 33.3 G/DL (ref 31–36)
MCV RBC AUTO: 93.5 FL (ref 80–100)
METHGB MFR BLDV: 0.3 %
MONOCYTES # BLD: 0.5 K/UL (ref 0–1.3)
MONOCYTES NFR BLD: 10.5 %
NEUTROPHILS # BLD: 2.7 K/UL (ref 1.7–7.7)
NEUTROPHILS NFR BLD: 52.5 %
O2 CT VFR BLDV CALC: 16 VOL %
O2 THERAPY: ABNORMAL
PCO2 BLDV: 39.9 MMHG (ref 40–50)
PH BLDV: 7.41 [PH] (ref 7.35–7.45)
PLATELET # BLD AUTO: 217 K/UL (ref 135–450)
PMV BLD AUTO: 9.9 FL (ref 5–10.5)
PO2 BLDV: 41.6 MMHG (ref 25–40)
POTASSIUM SERPL-SCNC: 3.2 MMOL/L (ref 3.5–5.1)
PROT SERPL-MCNC: 6.6 G/DL (ref 6.4–8.2)
RBC # BLD AUTO: 4.78 M/UL (ref 4–5.2)
SAO2 % BLDV: 78 %
SARS-COV-2 RNA RESP QL NAA+PROBE: NOT DETECTED
SODIUM SERPL-SCNC: 142 MMOL/L (ref 136–145)
TROPONIN, HIGH SENSITIVITY: 16 NG/L (ref 0–14)
TROPONIN, HIGH SENSITIVITY: 18 NG/L (ref 0–14)
WBC # BLD AUTO: 5.1 K/UL (ref 4–11)

## 2025-03-06 PROCEDURE — 96361 HYDRATE IV INFUSION ADD-ON: CPT

## 2025-03-06 PROCEDURE — 71260 CT THORAX DX C+: CPT

## 2025-03-06 PROCEDURE — 6370000000 HC RX 637 (ALT 250 FOR IP): Performed by: EMERGENCY MEDICINE

## 2025-03-06 PROCEDURE — 6360000004 HC RX CONTRAST MEDICATION: Performed by: EMERGENCY MEDICINE

## 2025-03-06 PROCEDURE — 83605 ASSAY OF LACTIC ACID: CPT

## 2025-03-06 PROCEDURE — 82803 BLOOD GASES ANY COMBINATION: CPT

## 2025-03-06 PROCEDURE — 84484 ASSAY OF TROPONIN QUANT: CPT

## 2025-03-06 PROCEDURE — 85025 COMPLETE CBC W/AUTO DIFF WBC: CPT

## 2025-03-06 PROCEDURE — 96360 HYDRATION IV INFUSION INIT: CPT

## 2025-03-06 PROCEDURE — 80053 COMPREHEN METABOLIC PANEL: CPT

## 2025-03-06 PROCEDURE — 87636 SARSCOV2 & INF A&B AMP PRB: CPT

## 2025-03-06 PROCEDURE — 99285 EMERGENCY DEPT VISIT HI MDM: CPT

## 2025-03-06 PROCEDURE — 83735 ASSAY OF MAGNESIUM: CPT

## 2025-03-06 PROCEDURE — 93005 ELECTROCARDIOGRAM TRACING: CPT | Performed by: EMERGENCY MEDICINE

## 2025-03-06 PROCEDURE — 2580000003 HC RX 258: Performed by: EMERGENCY MEDICINE

## 2025-03-06 RX ORDER — IPRATROPIUM BROMIDE AND ALBUTEROL SULFATE 2.5; .5 MG/3ML; MG/3ML
1 SOLUTION RESPIRATORY (INHALATION) ONCE
Status: COMPLETED | OUTPATIENT
Start: 2025-03-06 | End: 2025-03-06

## 2025-03-06 RX ORDER — DOXYCYCLINE HYCLATE 100 MG
100 TABLET ORAL 2 TIMES DAILY
Qty: 14 TABLET | Refills: 0 | Status: SHIPPED | OUTPATIENT
Start: 2025-03-06 | End: 2025-03-13

## 2025-03-06 RX ORDER — ALBUTEROL SULFATE 90 UG/1
2 INHALANT RESPIRATORY (INHALATION) 4 TIMES DAILY PRN
Qty: 18 G | Refills: 0 | Status: SHIPPED | OUTPATIENT
Start: 2025-03-06

## 2025-03-06 RX ORDER — SODIUM CHLORIDE, SODIUM LACTATE, POTASSIUM CHLORIDE, AND CALCIUM CHLORIDE .6; .31; .03; .02 G/100ML; G/100ML; G/100ML; G/100ML
1000 INJECTION, SOLUTION INTRAVENOUS ONCE
Status: COMPLETED | OUTPATIENT
Start: 2025-03-06 | End: 2025-03-06

## 2025-03-06 RX ORDER — ACETAMINOPHEN 500 MG
1000 TABLET ORAL
Status: COMPLETED | OUTPATIENT
Start: 2025-03-06 | End: 2025-03-06

## 2025-03-06 RX ORDER — PREDNISONE 20 MG/1
60 TABLET ORAL ONCE
Status: COMPLETED | OUTPATIENT
Start: 2025-03-06 | End: 2025-03-06

## 2025-03-06 RX ORDER — IOPAMIDOL 755 MG/ML
75 INJECTION, SOLUTION INTRAVASCULAR
Status: COMPLETED | OUTPATIENT
Start: 2025-03-06 | End: 2025-03-06

## 2025-03-06 RX ORDER — PREDNISONE 20 MG/1
40 TABLET ORAL DAILY
Qty: 8 TABLET | Refills: 0 | Status: SHIPPED | OUTPATIENT
Start: 2025-03-06 | End: 2025-03-10

## 2025-03-06 RX ADMIN — IOPAMIDOL 75 ML: 755 INJECTION, SOLUTION INTRAVENOUS at 16:52

## 2025-03-06 RX ADMIN — PREDNISONE 60 MG: 20 TABLET ORAL at 18:18

## 2025-03-06 RX ADMIN — SODIUM CHLORIDE, POTASSIUM CHLORIDE, SODIUM LACTATE AND CALCIUM CHLORIDE 1000 ML: 600; 310; 30; 20 INJECTION, SOLUTION INTRAVENOUS at 16:00

## 2025-03-06 RX ADMIN — IPRATROPIUM BROMIDE AND ALBUTEROL SULFATE 1 DOSE: 2.5; .5 SOLUTION RESPIRATORY (INHALATION) at 18:18

## 2025-03-06 RX ADMIN — ACETAMINOPHEN 1000 MG: 500 TABLET ORAL at 16:00

## 2025-03-06 ASSESSMENT — PAIN SCALES - GENERAL
PAINLEVEL_OUTOF10: 7
PAINLEVEL_OUTOF10: 8

## 2025-03-06 ASSESSMENT — PAIN DESCRIPTION - LOCATION
LOCATION: BACK
LOCATION: BACK

## 2025-03-06 ASSESSMENT — PAIN - FUNCTIONAL ASSESSMENT
PAIN_FUNCTIONAL_ASSESSMENT: NONE - DENIES PAIN
PAIN_FUNCTIONAL_ASSESSMENT: 0-10

## 2025-03-06 ASSESSMENT — PAIN DESCRIPTION - ORIENTATION: ORIENTATION: RIGHT;UPPER

## 2025-03-06 NOTE — DISCHARGE INSTRUCTIONS
Follow up with your primary doctor for further evaluation.  Return to the ER with any worsening symptoms, or new concerns.

## 2025-03-06 NOTE — ED PROVIDER NOTES
right chest    Concern for pneumonia, mass, PE, sepsis, cardiac cause, metabolic derangement  Initial diagnostic and management plan:  Labs, will plan for CT imaging, pain control, fluids    Social Determinants Significantly Affecting Health : None    ED treatments included:  ED Medication Orders (From admission, onward)      Start Ordered     Status Ordering Provider    03/06/25 1815 03/06/25 1808  predniSONE (DELTASONE) tablet 60 mg  ONCE         Last MAR action: Given - by BRIANA MEJIA on 03/06/25 at 1818 O'REBEKAHMAGAN    03/06/25 1815 03/06/25 1808  ipratropium 0.5 mg-albuterol 2.5 mg (DUONEB) nebulizer solution 1 Dose  ONCE        Question:  Initiate RT Bronchodilator Protocol  Answer:  No    Last MAR action: Given - by BRIANA MEJIA on 03/06/25 at 1818 O'MAGAN WELCH    03/06/25 1642 03/06/25 1642  iopamidol (ISOVUE-370) 76 % injection 75 mL  IMG ONCE PRN         Last MAR action: Given - by SAMANTHA MARIE on 03/06/25 at 1652 O'MAGAN WELCH    03/06/25 1545 03/06/25 1540  acetaminophen (TYLENOL) tablet 1,000 mg  NOW         Last MAR action: Given - by MICAELA LICONA on 03/06/25 at 1600 O'MAGAN WELCH    03/06/25 1545 03/06/25 1540  lactated ringers bolus 1,000 mL  ONCE         Last MAR action: Stopped - by MICAELA LICONA on 03/06/25 at 1855 O'REBEKAHMAGAN            Bedside Ultrasound  No results found.     Radiology  CT CHEST PULMONARY EMBOLISM W CONTRAST    Result Date: 3/6/2025  EXAMINATION: CTA OF THE CHEST 3/6/2025 4:51 pm TECHNIQUE: CTA of the chest was performed after the administration of intravenous contrast.  Multiplanar reformatted images are provided for review.  MIP images are provided for review. Automated exposure control, iterative reconstruction, and/or weight based adjustment of the mA/kV was utilized to reduce the radiation dose to as low as reasonably achievable. COMPARISON: Radiograph 02/23/2025. HISTORY: ORDERING SYSTEM PROVIDED HISTORY:

## 2025-03-07 LAB
EKG ATRIAL RATE: 110 BPM
EKG DIAGNOSIS: NORMAL
EKG P AXIS: 73 DEGREES
EKG P-R INTERVAL: 152 MS
EKG Q-T INTERVAL: 336 MS
EKG QRS DURATION: 82 MS
EKG QTC CALCULATION (BAZETT): 454 MS
EKG R AXIS: 45 DEGREES
EKG T AXIS: 89 DEGREES
EKG VENTRICULAR RATE: 110 BPM

## 2025-03-07 PROCEDURE — 93010 ELECTROCARDIOGRAM REPORT: CPT | Performed by: INTERNAL MEDICINE

## 2025-06-03 ENCOUNTER — HOSPITAL ENCOUNTER (INPATIENT)
Age: 65
LOS: 5 days | Discharge: NURSING, REHAB OR LONG-TERM CARE | DRG: 286 | End: 2025-06-10
Attending: EMERGENCY MEDICINE | Admitting: INTERNAL MEDICINE
Payer: MEDICARE

## 2025-06-03 ENCOUNTER — APPOINTMENT (OUTPATIENT)
Dept: GENERAL RADIOLOGY | Age: 65
DRG: 286 | End: 2025-06-03
Payer: MEDICARE

## 2025-06-03 DIAGNOSIS — E87.6 HYPOKALEMIA: ICD-10-CM

## 2025-06-03 DIAGNOSIS — R00.1 BRADYCARDIA: ICD-10-CM

## 2025-06-03 DIAGNOSIS — I25.85 CHRONIC CORONARY MICROVASCULAR DYSFUNCTION: ICD-10-CM

## 2025-06-03 DIAGNOSIS — R07.9 CHEST PAIN, UNSPECIFIED TYPE: Primary | ICD-10-CM

## 2025-06-03 DIAGNOSIS — R94.39 ABNORMAL STRESS TEST: ICD-10-CM

## 2025-06-03 PROBLEM — I25.119 CHEST PAIN DUE TO CAD: Status: ACTIVE | Noted: 2025-06-03

## 2025-06-03 LAB
ALBUMIN SERPL-MCNC: 3.4 G/DL (ref 3.4–5)
ALBUMIN/GLOB SERPL: 1.5 {RATIO} (ref 1.1–2.2)
ALP SERPL-CCNC: 117 U/L (ref 40–129)
ALT SERPL-CCNC: 10 U/L (ref 10–40)
ANION GAP SERPL CALCULATED.3IONS-SCNC: 10 MMOL/L (ref 3–16)
AST SERPL-CCNC: 15 U/L (ref 15–37)
BASOPHILS # BLD: 0 K/UL (ref 0–0.2)
BASOPHILS NFR BLD: 0.4 %
BILIRUB SERPL-MCNC: <0.2 MG/DL (ref 0–1)
BUN SERPL-MCNC: 11 MG/DL (ref 7–20)
CALCIUM SERPL-MCNC: 8.8 MG/DL (ref 8.3–10.6)
CHLORIDE SERPL-SCNC: 105 MMOL/L (ref 99–110)
CO2 SERPL-SCNC: 26 MMOL/L (ref 21–32)
CREAT SERPL-MCNC: 1.1 MG/DL (ref 0.6–1.2)
DEPRECATED RDW RBC AUTO: 15.2 % (ref 12.4–15.4)
EOSINOPHIL # BLD: 0.1 K/UL (ref 0–0.6)
EOSINOPHIL NFR BLD: 1.2 %
GFR SERPLBLD CREATININE-BSD FMLA CKD-EPI: 56 ML/MIN/{1.73_M2}
GLUCOSE SERPL-MCNC: 88 MG/DL (ref 70–99)
HCT VFR BLD AUTO: 37.6 % (ref 36–48)
HGB BLD-MCNC: 12.5 G/DL (ref 12–16)
LYMPHOCYTES # BLD: 2.1 K/UL (ref 1–5.1)
LYMPHOCYTES NFR BLD: 36.8 %
MAGNESIUM SERPL-MCNC: 1.84 MG/DL (ref 1.8–2.4)
MCH RBC QN AUTO: 31.8 PG (ref 26–34)
MCHC RBC AUTO-ENTMCNC: 33.1 G/DL (ref 31–36)
MCV RBC AUTO: 95.9 FL (ref 80–100)
MONOCYTES # BLD: 0.6 K/UL (ref 0–1.3)
MONOCYTES NFR BLD: 9.7 %
NEUTROPHILS # BLD: 3 K/UL (ref 1.7–7.7)
NEUTROPHILS NFR BLD: 51.9 %
PLATELET # BLD AUTO: 209 K/UL (ref 135–450)
PMV BLD AUTO: 9.1 FL (ref 5–10.5)
POTASSIUM SERPL-SCNC: 2.9 MMOL/L (ref 3.5–5.1)
PROT SERPL-MCNC: 5.6 G/DL (ref 6.4–8.2)
RBC # BLD AUTO: 3.92 M/UL (ref 4–5.2)
SODIUM SERPL-SCNC: 141 MMOL/L (ref 136–145)
TROPONIN, HIGH SENSITIVITY: 11 NG/L (ref 0–14)
TROPONIN, HIGH SENSITIVITY: 9 NG/L (ref 0–14)
WBC # BLD AUTO: 5.8 K/UL (ref 4–11)

## 2025-06-03 PROCEDURE — 83735 ASSAY OF MAGNESIUM: CPT

## 2025-06-03 PROCEDURE — 36415 COLL VENOUS BLD VENIPUNCTURE: CPT

## 2025-06-03 PROCEDURE — 80053 COMPREHEN METABOLIC PANEL: CPT

## 2025-06-03 PROCEDURE — 6370000000 HC RX 637 (ALT 250 FOR IP): Performed by: PHYSICIAN ASSISTANT

## 2025-06-03 PROCEDURE — 93005 ELECTROCARDIOGRAM TRACING: CPT | Performed by: EMERGENCY MEDICINE

## 2025-06-03 PROCEDURE — 84484 ASSAY OF TROPONIN QUANT: CPT

## 2025-06-03 PROCEDURE — 85025 COMPLETE CBC W/AUTO DIFF WBC: CPT

## 2025-06-03 PROCEDURE — 99285 EMERGENCY DEPT VISIT HI MDM: CPT

## 2025-06-03 PROCEDURE — 71045 X-RAY EXAM CHEST 1 VIEW: CPT

## 2025-06-03 RX ORDER — ASPIRIN 81 MG/1
324 TABLET, CHEWABLE ORAL ONCE
Status: DISCONTINUED | OUTPATIENT
Start: 2025-06-03 | End: 2025-06-10 | Stop reason: HOSPADM

## 2025-06-03 RX ADMIN — POTASSIUM BICARBONATE 40 MEQ: 782 TABLET, EFFERVESCENT ORAL at 22:36

## 2025-06-03 RX ADMIN — NITROGLYCERIN 0.5 INCH: 20 OINTMENT TOPICAL at 22:56

## 2025-06-03 ASSESSMENT — LIFESTYLE VARIABLES
HOW MANY STANDARD DRINKS CONTAINING ALCOHOL DO YOU HAVE ON A TYPICAL DAY: PATIENT DOES NOT DRINK
HOW OFTEN DO YOU HAVE A DRINK CONTAINING ALCOHOL: NEVER

## 2025-06-03 ASSESSMENT — PAIN DESCRIPTION - LOCATION: LOCATION: CHEST

## 2025-06-03 ASSESSMENT — PAIN - FUNCTIONAL ASSESSMENT: PAIN_FUNCTIONAL_ASSESSMENT: 0-10

## 2025-06-03 ASSESSMENT — PAIN SCALES - GENERAL: PAINLEVEL_OUTOF10: 4

## 2025-06-04 ENCOUNTER — APPOINTMENT (OUTPATIENT)
Age: 65
DRG: 286 | End: 2025-06-04
Payer: MEDICARE

## 2025-06-04 ENCOUNTER — HOSPITAL ENCOUNTER (OUTPATIENT)
Age: 65
Setting detail: OBSERVATION
Discharge: HOME | End: 2025-06-06
Payer: MEDICARE

## 2025-06-04 ENCOUNTER — APPOINTMENT (OUTPATIENT)
Dept: NUCLEAR MEDICINE | Age: 65
DRG: 286 | End: 2025-06-04
Payer: MEDICARE

## 2025-06-04 PROBLEM — R94.39 ABNORMAL STRESS TEST: Status: ACTIVE | Noted: 2025-06-03

## 2025-06-04 LAB
ANION GAP SERPL CALCULATED.3IONS-SCNC: 10 MMOL/L (ref 3–16)
BUN SERPL-MCNC: 13 MG/DL (ref 7–20)
CALCIUM SERPL-MCNC: 9.1 MG/DL (ref 8.3–10.6)
CHLORIDE SERPL-SCNC: 102 MMOL/L (ref 99–110)
CHOLEST SERPL-MCNC: 166 MG/DL (ref 0–199)
CO2 SERPL-SCNC: 28 MMOL/L (ref 21–32)
CREAT SERPL-MCNC: 1.2 MG/DL (ref 0.6–1.2)
DEPRECATED RDW RBC AUTO: 15.2 % (ref 12.4–15.4)
ECHO AO ASC DIAM: 2.8 CM
ECHO AO ASCENDING AORTA INDEX: 1.48 CM/M2
ECHO AO ROOT DIAM: 3.1 CM
ECHO AO ROOT INDEX: 1.64 CM/M2
ECHO AV MEAN GRADIENT: 2 MMHG
ECHO AV MEAN VELOCITY: 0.7 M/S
ECHO AV PEAK GRADIENT: 4 MMHG
ECHO AV PEAK VELOCITY: 1 M/S
ECHO AV VELOCITY RATIO: 0.8
ECHO AV VTI: 23.1 CM
ECHO BSA: 1.93 M2
ECHO BSA: 1.93 M2
ECHO EST RA PRESSURE: 3 MMHG
ECHO IVC PROX: 1.6 CM
ECHO LA AREA 2C: 17.3 CM2
ECHO LA AREA 4C: 16 CM2
ECHO LA MAJOR AXIS: 4.9 CM
ECHO LA MINOR AXIS: 5.2 CM
ECHO LA VOL BP: 45 ML (ref 22–52)
ECHO LA VOL MOD A2C: 46 ML (ref 22–52)
ECHO LA VOL MOD A4C: 42 ML (ref 22–52)
ECHO LA VOL/BSA BIPLANE: 24 ML/M2 (ref 16–34)
ECHO LA VOLUME INDEX MOD A2C: 24 ML/M2 (ref 16–34)
ECHO LA VOLUME INDEX MOD A4C: 22 ML/M2 (ref 16–34)
ECHO LV E' LATERAL VELOCITY: 5.11 CM/S
ECHO LV E' SEPTAL VELOCITY: 5.77 CM/S
ECHO LV EDV A2C: 72 ML
ECHO LV EDV A4C: 71 ML
ECHO LV EDV INDEX A4C: 38 ML/M2
ECHO LV EDV NDEX A2C: 38 ML/M2
ECHO LV EF PHYSICIAN: 55 %
ECHO LV EJECTION FRACTION A2C: 54 %
ECHO LV EJECTION FRACTION A4C: 57 %
ECHO LV EJECTION FRACTION BIPLANE: 55 % (ref 55–100)
ECHO LV ESV A2C: 33 ML
ECHO LV ESV A4C: 30 ML
ECHO LV ESV INDEX A2C: 17 ML/M2
ECHO LV ESV INDEX A4C: 16 ML/M2
ECHO LV FRACTIONAL SHORTENING: 18 % (ref 28–44)
ECHO LV INTERNAL DIMENSION DIASTOLE INDEX: 2.33 CM/M2
ECHO LV INTERNAL DIMENSION DIASTOLIC: 4.4 CM (ref 3.9–5.3)
ECHO LV INTERNAL DIMENSION SYSTOLIC INDEX: 1.9 CM/M2
ECHO LV INTERNAL DIMENSION SYSTOLIC: 3.6 CM
ECHO LV IVSD: 0.9 CM (ref 0.6–0.9)
ECHO LV MASS 2D: 137.8 G (ref 67–162)
ECHO LV MASS INDEX 2D: 72.9 G/M2 (ref 43–95)
ECHO LV POSTERIOR WALL DIASTOLIC: 1 CM (ref 0.6–0.9)
ECHO LV RELATIVE WALL THICKNESS RATIO: 0.45
ECHO LVOT AV VTI INDEX: 0.77
ECHO LVOT MEAN GRADIENT: 1 MMHG
ECHO LVOT PEAK GRADIENT: 3 MMHG
ECHO LVOT PEAK VELOCITY: 0.8 M/S
ECHO LVOT VTI: 17.7 CM
ECHO MV A VELOCITY: 0.53 M/S
ECHO MV E DECELERATION TIME (DT): 243 MS
ECHO MV E VELOCITY: 0.34 M/S
ECHO MV E/A RATIO: 0.64
ECHO MV E/E' LATERAL: 6.65
ECHO MV E/E' RATIO (AVERAGED): 6.27
ECHO MV E/E' SEPTAL: 5.89
ECHO RA AREA 4C: 8.6 CM2
ECHO RA END SYSTOLIC VOLUME APICAL 4 CHAMBER INDEX BSA: 7 ML/M2
ECHO RA VOLUME: 14 ML
ECHO RIGHT VENTRICULAR SYSTOLIC PRESSURE (RVSP): 19 MMHG
ECHO RV BASAL DIMENSION: 2.6 CM
ECHO RV FREE WALL PEAK S': 7.9 CM/S
ECHO RV LONGITUDINAL DIMENSION: 5.6 CM
ECHO RV MID DIMENSION: 2 CM
ECHO RV TAPSE: 2.1 CM (ref 1.7–?)
ECHO TV REGURGITANT MAX VELOCITY: 2.03 M/S
ECHO TV REGURGITANT PEAK GRADIENT: 16 MMHG
EKG ATRIAL RATE: 93 BPM
EKG DIAGNOSIS: NORMAL
EKG P AXIS: 71 DEGREES
EKG P-R INTERVAL: 162 MS
EKG Q-T INTERVAL: 354 MS
EKG QRS DURATION: 98 MS
EKG QTC CALCULATION (BAZETT): 440 MS
EKG R AXIS: 62 DEGREES
EKG T AXIS: 73 DEGREES
EKG VENTRICULAR RATE: 93 BPM
GFR SERPLBLD CREATININE-BSD FMLA CKD-EPI: 50 ML/MIN/{1.73_M2}
GLUCOSE SERPL-MCNC: 86 MG/DL (ref 70–99)
HCT VFR BLD AUTO: 37.8 % (ref 36–48)
HDLC SERPL-MCNC: 34 MG/DL (ref 40–60)
HGB BLD-MCNC: 12.8 G/DL (ref 12–16)
LDLC SERPL CALC-MCNC: 105 MG/DL
MCH RBC QN AUTO: 32.3 PG (ref 26–34)
MCHC RBC AUTO-ENTMCNC: 33.7 G/DL (ref 31–36)
MCV RBC AUTO: 96 FL (ref 80–100)
NUC STRESS EJECTION FRACTION: 61 %
NUC STRESS LV EDV: 59 ML (ref 56–104)
NUC STRESS LV ESV: 23 ML (ref 19–49)
NUC STRESS LV MASS: 96 G
PLATELET # BLD AUTO: 209 K/UL (ref 135–450)
PMV BLD AUTO: 9.4 FL (ref 5–10.5)
POTASSIUM SERPL-SCNC: 3.6 MMOL/L (ref 3.5–5.1)
RBC # BLD AUTO: 3.94 M/UL (ref 4–5.2)
SODIUM SERPL-SCNC: 140 MMOL/L (ref 136–145)
STRESS BASELINE DIAS BP: 94 MMHG
STRESS BASELINE HR: 57 BPM
STRESS BASELINE SYS BP: 134 MMHG
STRESS ESTIMATED WORKLOAD: 1 METS
STRESS PEAK DIAS BP: 103 MMHG
STRESS PEAK SYS BP: 170 MMHG
STRESS PERCENT HR ACHIEVED: 52 %
STRESS POST PEAK HR: 81 BPM
STRESS RATE PRESSURE PRODUCT: NORMAL BPM*MMHG
STRESS TARGET HR: 155 BPM
TRIGL SERPL-MCNC: 134 MG/DL (ref 0–150)
TROPONIN, HIGH SENSITIVITY: 12 NG/L (ref 0–14)
TROPONIN, HIGH SENSITIVITY: 9 NG/L (ref 0–14)
VLDLC SERPL CALC-MCNC: 27 MG/DL
WBC # BLD AUTO: 5.8 K/UL (ref 4–11)

## 2025-06-04 PROCEDURE — 78452 HT MUSCLE IMAGE SPECT MULT: CPT | Performed by: INTERNAL MEDICINE

## 2025-06-04 PROCEDURE — 36415 COLL VENOUS BLD VENIPUNCTURE: CPT

## 2025-06-04 PROCEDURE — 2500000003 HC RX 250 WO HCPCS: Performed by: NURSE PRACTITIONER

## 2025-06-04 PROCEDURE — G0378 HOSPITAL OBSERVATION PER HR: HCPCS

## 2025-06-04 PROCEDURE — 84484 ASSAY OF TROPONIN QUANT: CPT

## 2025-06-04 PROCEDURE — 93017 CV STRESS TEST TRACING ONLY: CPT

## 2025-06-04 PROCEDURE — 80061 LIPID PANEL: CPT

## 2025-06-04 PROCEDURE — 99222 1ST HOSP IP/OBS MODERATE 55: CPT | Performed by: INTERNAL MEDICINE

## 2025-06-04 PROCEDURE — 93016 CV STRESS TEST SUPVJ ONLY: CPT | Performed by: INTERNAL MEDICINE

## 2025-06-04 PROCEDURE — 80048 BASIC METABOLIC PNL TOTAL CA: CPT

## 2025-06-04 PROCEDURE — 93306 TTE W/DOPPLER COMPLETE: CPT

## 2025-06-04 PROCEDURE — 6370000000 HC RX 637 (ALT 250 FOR IP): Performed by: NURSE PRACTITIONER

## 2025-06-04 PROCEDURE — 85027 COMPLETE CBC AUTOMATED: CPT

## 2025-06-04 PROCEDURE — 96374 THER/PROPH/DIAG INJ IV PUSH: CPT

## 2025-06-04 PROCEDURE — 6360000002 HC RX W HCPCS: Performed by: NURSE PRACTITIONER

## 2025-06-04 PROCEDURE — 93018 CV STRESS TEST I&R ONLY: CPT | Performed by: INTERNAL MEDICINE

## 2025-06-04 PROCEDURE — A9502 TC99M TETROFOSMIN: HCPCS | Performed by: NURSE PRACTITIONER

## 2025-06-04 PROCEDURE — 93010 ELECTROCARDIOGRAM REPORT: CPT | Performed by: INTERNAL MEDICINE

## 2025-06-04 PROCEDURE — 78452 HT MUSCLE IMAGE SPECT MULT: CPT

## 2025-06-04 PROCEDURE — 93306 TTE W/DOPPLER COMPLETE: CPT | Performed by: INTERNAL MEDICINE

## 2025-06-04 PROCEDURE — 6370000000 HC RX 637 (ALT 250 FOR IP): Performed by: INTERNAL MEDICINE

## 2025-06-04 PROCEDURE — 3430000000 HC RX DIAGNOSTIC RADIOPHARMACEUTICAL: Performed by: NURSE PRACTITIONER

## 2025-06-04 PROCEDURE — 96372 THER/PROPH/DIAG INJ SC/IM: CPT

## 2025-06-04 RX ORDER — SODIUM CHLORIDE 9 MG/ML
INJECTION, SOLUTION INTRAVENOUS PRN
Status: DISCONTINUED | OUTPATIENT
Start: 2025-06-04 | End: 2025-06-10 | Stop reason: HOSPADM

## 2025-06-04 RX ORDER — ASPIRIN 81 MG/1
81 TABLET ORAL DAILY
Status: DISCONTINUED | OUTPATIENT
Start: 2025-06-04 | End: 2025-06-10 | Stop reason: HOSPADM

## 2025-06-04 RX ORDER — SODIUM CHLORIDE 0.9 % (FLUSH) 0.9 %
5-40 SYRINGE (ML) INJECTION PRN
Status: DISCONTINUED | OUTPATIENT
Start: 2025-06-04 | End: 2025-06-10 | Stop reason: HOSPADM

## 2025-06-04 RX ORDER — ACETAMINOPHEN 325 MG/1
650 TABLET ORAL EVERY 6 HOURS PRN
Status: DISCONTINUED | OUTPATIENT
Start: 2025-06-04 | End: 2025-06-06

## 2025-06-04 RX ORDER — TRAMADOL HYDROCHLORIDE 50 MG/1
50 TABLET ORAL EVERY 6 HOURS PRN
Status: DISCONTINUED | OUTPATIENT
Start: 2025-06-04 | End: 2025-06-10 | Stop reason: HOSPADM

## 2025-06-04 RX ORDER — ALPRAZOLAM 0.25 MG
0.5 TABLET ORAL NIGHTLY PRN
Status: DISCONTINUED | OUTPATIENT
Start: 2025-06-04 | End: 2025-06-10 | Stop reason: HOSPADM

## 2025-06-04 RX ORDER — POTASSIUM CHLORIDE 1500 MG/1
20 TABLET, EXTENDED RELEASE ORAL 2 TIMES DAILY
Status: DISCONTINUED | OUTPATIENT
Start: 2025-06-04 | End: 2025-06-10 | Stop reason: HOSPADM

## 2025-06-04 RX ORDER — SODIUM CHLORIDE 0.9 % (FLUSH) 0.9 %
5-40 SYRINGE (ML) INJECTION EVERY 12 HOURS SCHEDULED
Status: DISCONTINUED | OUTPATIENT
Start: 2025-06-04 | End: 2025-06-10 | Stop reason: HOSPADM

## 2025-06-04 RX ORDER — ASPIRIN 81 MG/1
81 TABLET, CHEWABLE ORAL DAILY
Status: DISCONTINUED | OUTPATIENT
Start: 2025-06-04 | End: 2025-06-04

## 2025-06-04 RX ORDER — LOSARTAN POTASSIUM 25 MG/1
50 TABLET ORAL 2 TIMES DAILY
Status: DISCONTINUED | OUTPATIENT
Start: 2025-06-04 | End: 2025-06-08

## 2025-06-04 RX ORDER — LIDOCAINE 4 G/G
1 PATCH TOPICAL DAILY
Status: DISCONTINUED | OUTPATIENT
Start: 2025-06-04 | End: 2025-06-10 | Stop reason: HOSPADM

## 2025-06-04 RX ORDER — VENLAFAXINE HYDROCHLORIDE 37.5 MG/1
225 CAPSULE, EXTENDED RELEASE ORAL
Status: DISCONTINUED | OUTPATIENT
Start: 2025-06-04 | End: 2025-06-10 | Stop reason: HOSPADM

## 2025-06-04 RX ORDER — ENOXAPARIN SODIUM 100 MG/ML
40 INJECTION SUBCUTANEOUS DAILY
Status: DISCONTINUED | OUTPATIENT
Start: 2025-06-04 | End: 2025-06-10 | Stop reason: HOSPADM

## 2025-06-04 RX ORDER — ATORVASTATIN CALCIUM 80 MG/1
80 TABLET, FILM COATED ORAL NIGHTLY
Status: DISCONTINUED | OUTPATIENT
Start: 2025-06-04 | End: 2025-06-10 | Stop reason: HOSPADM

## 2025-06-04 RX ORDER — METOPROLOL SUCCINATE 50 MG/1
100 TABLET, EXTENDED RELEASE ORAL DAILY
Status: DISCONTINUED | OUTPATIENT
Start: 2025-06-04 | End: 2025-06-10 | Stop reason: HOSPADM

## 2025-06-04 RX ORDER — ONDANSETRON 4 MG/1
4 TABLET, ORALLY DISINTEGRATING ORAL EVERY 8 HOURS PRN
Status: DISCONTINUED | OUTPATIENT
Start: 2025-06-04 | End: 2025-06-10 | Stop reason: HOSPADM

## 2025-06-04 RX ORDER — RANOLAZINE 500 MG/1
1000 TABLET, EXTENDED RELEASE ORAL 2 TIMES DAILY
Status: DISCONTINUED | OUTPATIENT
Start: 2025-06-04 | End: 2025-06-10 | Stop reason: HOSPADM

## 2025-06-04 RX ORDER — ACETAMINOPHEN 650 MG/1
650 SUPPOSITORY RECTAL EVERY 6 HOURS PRN
Status: DISCONTINUED | OUTPATIENT
Start: 2025-06-04 | End: 2025-06-06

## 2025-06-04 RX ORDER — ONDANSETRON 2 MG/ML
4 INJECTION INTRAMUSCULAR; INTRAVENOUS EVERY 6 HOURS PRN
Status: DISCONTINUED | OUTPATIENT
Start: 2025-06-04 | End: 2025-06-10 | Stop reason: HOSPADM

## 2025-06-04 RX ORDER — POLYETHYLENE GLYCOL 3350 17 G/17G
17 POWDER, FOR SOLUTION ORAL DAILY PRN
Status: DISCONTINUED | OUTPATIENT
Start: 2025-06-04 | End: 2025-06-10 | Stop reason: HOSPADM

## 2025-06-04 RX ORDER — REGADENOSON 0.08 MG/ML
0.4 INJECTION, SOLUTION INTRAVENOUS
Status: COMPLETED | OUTPATIENT
Start: 2025-06-04 | End: 2025-06-04

## 2025-06-04 RX ORDER — ALBUTEROL SULFATE 90 UG/1
2 INHALANT RESPIRATORY (INHALATION) 4 TIMES DAILY PRN
Status: DISCONTINUED | OUTPATIENT
Start: 2025-06-04 | End: 2025-06-10 | Stop reason: HOSPADM

## 2025-06-04 RX ORDER — PANTOPRAZOLE SODIUM 40 MG/1
40 TABLET, DELAYED RELEASE ORAL 2 TIMES DAILY
Status: DISCONTINUED | OUTPATIENT
Start: 2025-06-04 | End: 2025-06-10 | Stop reason: HOSPADM

## 2025-06-04 RX ADMIN — ONDANSETRON 4 MG: 2 INJECTION INTRAMUSCULAR; INTRAVENOUS at 12:10

## 2025-06-04 RX ADMIN — NITROGLYCERIN 1 INCH: 20 OINTMENT TOPICAL at 01:32

## 2025-06-04 RX ADMIN — POTASSIUM CHLORIDE 20 MEQ: 1500 TABLET, EXTENDED RELEASE ORAL at 13:42

## 2025-06-04 RX ADMIN — TRAMADOL HYDROCHLORIDE 50 MG: 50 TABLET, COATED ORAL at 09:27

## 2025-06-04 RX ADMIN — ASPIRIN 81 MG: 81 TABLET, COATED ORAL at 15:58

## 2025-06-04 RX ADMIN — RANOLAZINE 1000 MG: 500 TABLET, FILM COATED, EXTENDED RELEASE ORAL at 13:43

## 2025-06-04 RX ADMIN — SODIUM CHLORIDE, PRESERVATIVE FREE 10 ML: 5 INJECTION INTRAVENOUS at 19:30

## 2025-06-04 RX ADMIN — NITROGLYCERIN 1 INCH: 20 OINTMENT TOPICAL at 13:42

## 2025-06-04 RX ADMIN — LOSARTAN POTASSIUM 50 MG: 25 TABLET, FILM COATED ORAL at 19:28

## 2025-06-04 RX ADMIN — METOPROLOL SUCCINATE 100 MG: 50 TABLET, EXTENDED RELEASE ORAL at 09:38

## 2025-06-04 RX ADMIN — SODIUM CHLORIDE, PRESERVATIVE FREE 10 ML: 5 INJECTION INTRAVENOUS at 13:49

## 2025-06-04 RX ADMIN — LOSARTAN POTASSIUM 50 MG: 25 TABLET, FILM COATED ORAL at 01:33

## 2025-06-04 RX ADMIN — POTASSIUM CHLORIDE 20 MEQ: 1500 TABLET, EXTENDED RELEASE ORAL at 01:32

## 2025-06-04 RX ADMIN — PANTOPRAZOLE SODIUM 40 MG: 40 TABLET, DELAYED RELEASE ORAL at 01:33

## 2025-06-04 RX ADMIN — REGADENOSON 0.4 MG: 0.08 INJECTION, SOLUTION INTRAVENOUS at 12:07

## 2025-06-04 RX ADMIN — POTASSIUM CHLORIDE 20 MEQ: 1500 TABLET, EXTENDED RELEASE ORAL at 19:28

## 2025-06-04 RX ADMIN — TETROFOSMIN 31 MILLICURIE: 1.38 INJECTION, POWDER, LYOPHILIZED, FOR SOLUTION INTRAVENOUS at 12:07

## 2025-06-04 RX ADMIN — RANOLAZINE 1000 MG: 500 TABLET, FILM COATED, EXTENDED RELEASE ORAL at 19:28

## 2025-06-04 RX ADMIN — PANTOPRAZOLE SODIUM 40 MG: 40 TABLET, DELAYED RELEASE ORAL at 19:28

## 2025-06-04 RX ADMIN — ALPRAZOLAM 0.5 MG: 0.25 TABLET ORAL at 01:33

## 2025-06-04 RX ADMIN — ATORVASTATIN CALCIUM 80 MG: 80 TABLET, FILM COATED ORAL at 01:33

## 2025-06-04 RX ADMIN — NITROGLYCERIN 1 INCH: 20 OINTMENT TOPICAL at 06:28

## 2025-06-04 RX ADMIN — ATORVASTATIN CALCIUM 80 MG: 80 TABLET, FILM COATED ORAL at 19:28

## 2025-06-04 RX ADMIN — VENLAFAXINE HYDROCHLORIDE 225 MG: 37.5 CAPSULE, EXTENDED RELEASE ORAL at 13:43

## 2025-06-04 RX ADMIN — ENOXAPARIN SODIUM 40 MG: 100 INJECTION SUBCUTANEOUS at 15:58

## 2025-06-04 RX ADMIN — PANTOPRAZOLE SODIUM 40 MG: 40 TABLET, DELAYED RELEASE ORAL at 13:43

## 2025-06-04 RX ADMIN — TETROFOSMIN 10.7 MILLICURIE: 1.38 INJECTION, POWDER, LYOPHILIZED, FOR SOLUTION INTRAVENOUS at 10:59

## 2025-06-04 RX ADMIN — LOSARTAN POTASSIUM 50 MG: 25 TABLET, FILM COATED ORAL at 13:42

## 2025-06-04 ASSESSMENT — PAIN SCALES - GENERAL
PAINLEVEL_OUTOF10: 8
PAINLEVEL_OUTOF10: 0
PAINLEVEL_OUTOF10: 6
PAINLEVEL_OUTOF10: 6
PAINLEVEL_OUTOF10: 5
PAINLEVEL_OUTOF10: 0
PAINLEVEL_OUTOF10: 0
PAINLEVEL_OUTOF10: 8
PAINLEVEL_OUTOF10: 8

## 2025-06-04 ASSESSMENT — PAIN DESCRIPTION - LOCATION
LOCATION: CHEST
LOCATION: BACK
LOCATION: CHEST
LOCATION: BACK
LOCATION: CHEST

## 2025-06-04 ASSESSMENT — PAIN DESCRIPTION - DESCRIPTORS
DESCRIPTORS: SHARP
DESCRIPTORS: TIGHTNESS
DESCRIPTORS: TIGHTNESS
DESCRIPTORS: ACHING
DESCRIPTORS: TIGHTNESS

## 2025-06-04 ASSESSMENT — PAIN DESCRIPTION - ONSET: ONSET: ON-GOING

## 2025-06-04 ASSESSMENT — PAIN DESCRIPTION - ORIENTATION
ORIENTATION: LEFT
ORIENTATION: LOWER

## 2025-06-04 ASSESSMENT — PAIN DESCRIPTION - PAIN TYPE
TYPE: ACUTE PAIN
TYPE: ACUTE PAIN

## 2025-06-04 ASSESSMENT — PAIN - FUNCTIONAL ASSESSMENT: PAIN_FUNCTIONAL_ASSESSMENT: ACTIVITIES ARE NOT PREVENTED

## 2025-06-04 ASSESSMENT — PAIN DESCRIPTION - FREQUENCY: FREQUENCY: CONTINUOUS

## 2025-06-04 NOTE — PROGRESS NOTES
Patient denies chest pain this AM but does have 8/10 low back pain. Patient states she has been packing for a move to \"an old folks home this weekend\". Let MD know. Patient has unsteady gait/weakness to legs. VSS. Room air. Will monitor.

## 2025-06-04 NOTE — PROGRESS NOTES
[facility] Medicine Progress Note  V 5.17      Date of Admission: 6/3/2025    Hospital Day: 2      Chief Admission Complaint:  Chest pain    Subjective:  No further pain. Stress test completed and discussed results.     Presenting Admission History:       64 y.o. female who presented to [facility] with chest pain.  PMHx significant for hypertension, CAD, hyperlipidemia, GERD.  History obtained from the patient and review of EMR.  Patient stated she has been experiencing intermittent chest pain for the last 3 days.  She describes the pain as midsternal, sharp and radiates under her left breast.  She does report associated shortness of breath, especially on exertion.  Patient stated she does have a history of cardiac disease and does have 3 stents placed.  She reports prior to going to the emergency department this evening that her chest pain was more of a \"tightness.  The patient stated she took some nitroglycerin which she did get some relief. In the emergency department the patient had a negative troponin x 2 as well as a nonischemic EKG.  She was given aspirin and nitro ointment was placed to chest wall.  Upon further evaluation, the patient was found to be hypokalemic.  This was replaced.  She was admitted for further evaluation and treatment.       Assessment/Plan:      Chest pain - Initially concerning for ACS but possibly secondary to CAD w/ chronic angina.  Troponin not elevated.  EKG non-ischemic.  Hx of RCA PCI (6/2019). [facility] 2/2024 at [facility] which demonstrated patent stents in the RCA and diagonal. NM Stress Test at [facility] 5/2024 was negative. Followed by [facility]. Currently on ASA, Statin, BB, ARB, Imdur and Ranexa.  Cardiology consulted. Stress test showed possible ischemic, moderate risk study. Cardiology recommending Angiogram and she is agreeable.  ECHO ordered and pending.    Hypertension, benign: Controlled. Continue current medication regimen, monitor and adjust as  Insulin gtt    [] Anticoagulation (Heparin gtt or Coumadin - other anticoagulants in special circumstances)    [] Cardiac Medications (IV Amiodarone/Diltiazem, Tikosyn, etc)    [] Hemodialysis    [] Other -    [] Change in code status    [] Decision to escalate care    [] Major surgery/procedure with associated risk factors    --------------------------------------------------  C. Data (any 2)    [x] Data Review (any 3)    [x] Consultant notes from yesterday/today    [x] All available current labs reviewed interpreted for clinical significance    [x] Appropriate follow-up labs were ordered  [] Collateral history obtained     [x] Independent Interpretation of tests (any 1)    [x] Telemetry (Rhythm Strip) personally reviewed and interpreted        [] Imaging personally reviewed and interpreted     [x] Discussion (any 1)  [x] Multi-Disciplinary Rounds with Case Management  [] Discussed management of the case with           Labs:  Personally reviewed on 6/4/2025 and interpreted for clinical significance as documented above.     Recent Labs     06/03/25 2129 06/04/25  0152   WBC 5.8 5.8   HGB 12.5 12.8   HCT 37.6 37.8    209     Recent Labs     06/03/25 2129 06/04/25  0152    140   K 2.9* 3.6    102   CO2 26 28   BUN 11 13   CREATININE 1.1 1.2   CALCIUM 8.8 9.1   MG 1.84  --      Recent Labs     06/03/25  2300 06/04/25  0046 06/04/25  0152   TROPHS 9 12 9     No results for input(s): \"LABA1C\" in the last 72 hours.  Recent Labs     06/03/25 2129   AST 15   ALT 10   BILITOT <0.2   ALKPHOS 117     No results for input(s): \"INR\", \"LACTA\", \"TSH\" in the last 72 hours.    Urine Cultures:   Lab Results   Component Value Date/Time    LABURIN  11/15/2018 01:20 AM     <50,000 CFU/ml mixed skin/urogenital willis. No further workup     Blood Cultures: No results found for: \"BC\"  No results found for: \"BLOODCULT2\"  Organism: No results found for: \"ORG\"      Franky Youssef MD

## 2025-06-04 NOTE — CONSULTS
[address] [place] [address]                              CONSULTATION      PATIENT NAME: CASPER MARIN              : 1960  MED REC NO: [id number]                      ROOM: 0529  ACCOUNT NO: [id number]                       ADMIT DATE: 2025  PROVIDER: Juan Robert MD      CONSULT DATE: 2025    REASON FOR CONSULTATION:  Chest pain.    HISTORY OF PRESENT ILLNESS:  64-year-old female with history of coronary artery disease, presented to hospital with complaints of chest pain.  She had been doing relatively well until last few days, specifically does not typically have chest pain; however, over the last few days, she has been having intermittent episodes of chest pain.  The pain comes on randomly.  There does not seem to be any precipitating factor.  It can last up to an hour, resolves spontaneously, does not radiate.  She does not have any associated symptoms.  She is currently pain free.  It is moderate in severity.    PAST MEDICAL HISTORY:    1. Coronary artery disease, status post angioplasty with stent, 2018.  2. Hypertension.  3. Hyperlipidemia.    SOCIAL HISTORY:  She smokes.    FAMILY HISTORY:  Positive for heart disease.    REVIEW OF SYSTEMS:  No fevers, chills, cough.  No focal neurologic symptoms.  No headache or visual changes.  No recent GI or  bleeding.  No syncope.  No rash.  All other systems are negative, except as in History of Present Illness.    ALLERGIES:  HYDROCODONE, ACETAMINOPHEN.      MEDICATIONS:  See list in the chart, which I have reviewed.    PHYSICAL EXAMINATION:  VITAL SIGNS:  Blood pressure is 118/80, heart rate 81, respirations 16, temperature 97.7.  She is 97% saturated on room air.  GENERAL:  A well-developed, well-nourished white female, in no acute distress.    HEENT:  Normocephalic, atraumatic.  Oropharynx clear.  Moist mucous membranes.    NECK:  Supple.    CHEST:   MD      D:  06/04/2025 14:51:35     T:  06/04/2025 16:10:10     KIRT/SALMA  Job #:  [id number]     Doc#:  [id number]

## 2025-06-04 NOTE — ED NOTES
Dea Georges is a 64 y.o. female admitted for  Principal Problem:    Chest pain due to CAD  Resolved Problems:    * No resolved hospital problems. *  .   Patient Home via EMS transportation with   Chief Complaint   Patient presents with    Chest Pain     Pt arrives via EMS from home for chest pain and pressure. Pt reports she took 2 nitro at home with relief. Pt reports pain 4/10. Pt hx 3 heart attacks and 3 stents.   .  Patient is alert and Person, Place, Time, and Situation  Patient's baseline mobility: Baseline Mobility: Walker  Code Status: Prior   Cardiac Rhythm: Cardiac Rhythm: Sinus rhythm     Is patient on baseline Oxygen: no how many Liters:   Abnormal Assessment Findings:     Isolation: None      NIH Score:    C-SSRS: Risk of Suicide: No Risk  Bedside swallow:        Active LDA's:   Peripheral IV 06/03/25 Right Antecubital (Active)     Patient admitted with a meléndez:  If the meléndez is chronic was it exchanged:  Reason for meléndez:   Patient admitted with Central Line:  . PICC line placement confirmed:   Reason for Central line:   Was central line Inserted from an outside facility:        Family/Caregiver Present no Any Concerns: no   Restraints no  Sitter no         Vitals:      Vitals:    06/03/25 2211 06/03/25 2240 06/03/25 2256 06/03/25 2313   BP: 124/80 110/78 110/78 (!) 140/77   Pulse: 85 85 84 82   Resp: 21 16  15   Temp:       SpO2: 96% 98%  99%       Last documented pain score (0-10 scale) Pain Level: 4  Pain medication administered Yes- see MAR.    Pertinent or High Risk Medications/Drips: No.    Pending Blood Product Administration: no    Abnormal labs:   Abnormal Labs Reviewed   CBC WITH AUTO DIFFERENTIAL - Abnormal; Notable for the following components:       Result Value    RBC 3.92 (*)     All other components within normal limits   COMPREHENSIVE METABOLIC PANEL - Abnormal; Notable for the following components:    Potassium 2.9 (*)     Est, Glom Filt Rate 56 (*)     Total Protein 5.6 (*)

## 2025-06-04 NOTE — ED PROVIDER NOTES
[facility] EMERGENCY DEPARTMENT  Emergency Department Encounter    Patient Name: Dea Georges  MRN: [id number]  YOB: 1960  Date of Evaluation: 6/3/2025  Provider: Dorota Bee MD  Note Started: 9:24 PM EDT 6/3/25    CHIEF COMPLAINT  Chest Pain (Pt arrives via EMS from home for chest pain and pressure. Pt reports she took 2 nitro at home with relief. Pt reports pain 4/10. Pt hx 3 heart attacks and 3 stents.)    SHARED SERVICE VISIT  I have seen and evaluated this patient with my supervising physician, Dr. Yee.     HISTORY OF PRESENT ILLNESS  Dea Georges is a 64 y.o. female who presents to the ED for evaluation of chest pain.  Patient reports symptoms have been ongoing past 4 days.  History of prior MIs with stents.  Porting anginal equivalent.  Took some nitro with some improvement.  Today just feels tight.  Mild shortness of breath.  Half pack per day cigarette smoker.  Mild nonproductive cough.  No hemoptysis.  Denies headaches or dizziness.  No neck, arm or jaw pain.  She denies nausea vomiting or diarrhea.  No pain in legs or swelling.  No pain with deep breaths.    No other complaints, modifying factors or associated symptoms.     Nursing notes reviewed were all reviewed and agreed with or any disagreements were addressed in the HPI.    PMH:  Past Medical History:   Diagnosis Date    Arthritis     hips and ankles, neck, lower back    Asthma     CAD in native artery 4/17/2018    Hypertension      Surgical History:  Past Surgical History:   Procedure Laterality Date    APPENDECTOMY      CORONARY ANGIOPLASTY WITH STENT PLACEMENT Left 04/2018    LIPOMA RESECTION Right     MI LAPAROSCOPY SURG CHOLECYSTECTOMY N/A 2/24/2018    CHOLECYSTECTOMY LAPAROSCOPIC ROBOTIC performed by Joshua Johnson MD at [facility] OR     Family History:  Family History   Problem Relation Age of Onset    Cancer Father      Social History:  Social History     Socioeconomic History    Marital status:  PANEL - Abnormal; Notable for the following components:    Potassium 2.9 (*)     Est, Glom Filt Rate 56 (*)     Total Protein 5.6 (*)     All other components within normal limits    Narrative:     CALL  Adamson  SAED tel. [phone],                  Chemistry results called to and read back by Keli Hernandez RN, 06/03/2025 22:05,                  by LESLEY   TROPONIN    Narrative:     CALL  Adamson  SAED tel. [phone],                  Chemistry results called to and read back by Keli Hernandez RN, 06/03/2025 22:05,                  by LESLEY   MAGNESIUM    Narrative:     CALL  Adamson  SAED tel. [phone],                  Chemistry results called to and read back by Keli Hernandez RN, 06/03/2025 22:05,                  by LESLEY   TROPONIN     When ordered, only abnormal lab results are displayed.  All other labs were within normal range or not returned as of this dictation.     RADIOLOGY  Non-plain film images such as CT, U/S, and MRI are read by the radiologist.  Plain radiographic images are visualized and preliminarily interpreted by the ED Provider with the below findings:     Stable chest x-ray.  No overt edema or infiltrate.    Interpretation per the Radiologist below, if available at the time of this note:  XR CHEST PORTABLE   Final Result      No acute disease      Electronically signed by Eulogio Braswell DO        PROCEDURES  Unless otherwise noted below, none.    ED COURSE/DDx/MDM  History obtained from:  Patient    Vitals:  Vitals:    06/03/25 2211 06/03/25 2240 06/03/25 2256 06/03/25 2313   BP: 124/80 110/78 110/78 (!) 140/77   Pulse: 85 85 84 82   Resp: 21 16  15   Temp:       SpO2: 96% 98%  99%     Patient received following medications in ED:  Medications   aspirin chewable tablet 324 mg (324 mg Oral Not Given 6/3/25 2254)   potassium bicarb-citric acid (EFFER-K) effervescent tablet 40 mEq (40 mEq Oral Given 6/3/25 2236)   nitroglycerin (NITRO-BID) 2 % ointment 0.5 inch (0.5 inches Topical Given 6/3/25 2256)     Sepsis

## 2025-06-04 NOTE — PROGRESS NOTES
Patient admitted to room 529 from ed.  Patient oriented to room, call light, bed rails, phone, lights and bathroom.  Patient instructed about the schedule of the day including: vital sign frequency, lab draws, possible tests, frequency of MD and staff rounds, including RN/MD rounding together at bedside, daily weights, and I &O's.  Patient instructed about prescribed diet, how to use 8MENU, and television.   bed alarm in place, patient aware of placement and reason.   Telemetry box 121 in place, patient aware of placement and reason.  Bed locked, in lowest position, side rails up 2/4, call light within reach.  Will continue to monitor.       Patient kept NPO

## 2025-06-04 NOTE — ED PROVIDER NOTES
Attending Supervisory Note/Shared Visit   I have personally performed a face to face diagnostic evaluation on this patient. I have reviewed the mid-level’s findings and agree.  History and Exam by me shows well-appearing female no acute distress.  Patient presented ED valuation of chest pain.  Reports a family history of intermittent chest pains to the left side of the chest without radiation.  States that symptoms became more frequent and more intense over the past few days.  States had similar pains in the past.  She denies difficulties breathing fevers or cough.  Patient does have extensive cardiac history.  She reports she did take nitroglycerin at home without improvement of symptoms.     At time evaluation patient resting comfortably.  Vital signs are within normal limits.  Patient has a regular rate and rhythm.  Lungs are auscultation.  Soft nontender nondistended.  Do not appreciate a chest wall tenderness.     Patient initially seen by advanced practice provider.  Troponin within normal limits.  Noted to be hypokalemic which was replaced in the emergency department.  Chest x-ray that acute cardiopulmonary disease.  Due to patient's chest pain with multiple risk factors for CAD she will be admitted for further medical management evaluation.  Consultation placed to hospitalist agreed to accept the patient.     EKG shows a sinus rhythm ventricular of 93 bpm.  CT interval and QTc interval within normal limits.  Patient has a normal axis.  There are no significant ST elevations or depressions EKG is nondiagnostic for ACS as interpreted by me..  Compared to ED from 3/6/2025 there are longer ST depressions in the lateral leads.           Disposition:  1. Chest pain, unspecified type    2. Hypokalemia    3. Chronic coronary microvascular dysfunction          Erasmo Yee MD  Attending Emergency Physician        Erasmo Yee MD  06/04/25 0153

## 2025-06-04 NOTE — PROGRESS NOTES
4 Eyes Skin Assessment     NAME:  Dea Georges  YOB: 1960  MEDICAL RECORD NUMBER:  [id number]    The patient is being assessed for  Admission    I agree that at least one RN has performed a thorough Head to Toe Skin Assessment on the patient. ALL assessment sites listed below have been assessed.      Areas assessed by both nurses:    Head, Face, Ears, Shoulders, Back, Chest, Arms, Elbows, Hands, Sacrum. Buttock, Coccyx, Ischium, Legs. Feet and Heels, and Under Medical Devices         Does the Patient have a Wound? No noted wound(s)       Nabil Prevention initiated by RN: Yes  Wound Care Orders initiated by RN: No    Pressure Injury (Stage 3,4, Unstageable, DTI, NWPT, and Complex wounds) if present, place Wound referral order by RN under : No    New Ostomies, if present place, Ostomy referral order under : No     Nurse 1 eSignature: Electronically signed by Sravani Anguiano RN on 6/4/25 at 12:44 AM EDT    **SHARE this note so that the co-signing nurse can place an eSignature**    Nurse 2 eSignature: Electronically signed by Layton Ingram RN on 6/4/25 at 1:02 AM EDT

## 2025-06-04 NOTE — NURSING NOTE
A Lexiscan stress test was completed on this patient as ordered. Zofran given ivp after stress test due to nausea related to Lexiscan.  Awaiting stress imaging at this time.

## 2025-06-04 NOTE — CONSULTS
[id number]    Chest pain s/o angina class 4  CAD, stent 2018  HTN  HLD  Smoker  Echo 5/2024 55%  Stress test abnormal    Cardiac cath

## 2025-06-04 NOTE — CARE COORDINATION
Case Management Assessment  Initial Evaluation    Date/Time of Evaluation: 6/4/2025 9:20 AM  Assessment Completed by: Krystal Hall RN    If patient is discharged prior to next notation, then this note serves as note for discharge by case management.    Patient Name: Dea Georges                   YOB: 1960  Diagnosis: Hypokalemia [E87.6]  Chest pain, unspecified type [R07.9]  Chest pain due to CAD [I25.119]  Chronic coronary microvascular dysfunction [I25.85]                   Date / Time: 6/3/2025  9:01 PM    Patient Admission Status: Observation   Readmission Risk (Low < 19, Mod (19-27), High > 27): No data recorded  Current PCP: Dorota Bee MD  PCP verified by CM? Yes (Dorota De La Rosa)    Chart Reviewed: Yes      History Provided by: Patient, Medical Record  Patient Orientation: Alert and Oriented    Patient Cognition: Alert    Hospitalization in the last 30 days (Readmission):  No    If yes, Readmission Assessment in CM Navigator will be completed.    Advance Directives:      Code Status: Full Code   Patient's Primary Decision Maker is: Patient Declined (Legal Next of Kin Remains as Decision Maker)      Discharge Planning:    Patient lives with: Alone Type of Home: Trailer/Mobile Home  Primary Care Giver: Self  Patient Support Systems include: Children   Current Financial resources: Medicare  Current community resources: None  Current services prior to admission: Durable Medical Equipment            Current DME: Walker, Cane            Type of Home Care services:  None    ADLS  Prior functional level: Independent in ADLs/IADLs  Current functional level: Independent in ADLs/IADLs    PT AM-PAC:   /24  OT AM-PAC:   /24    Family can provide assistance at DC: Yes  Would you like Case Management to discuss the discharge plan with any other family members/significant others, and if so, who? No  Plans to Return to Present Housing: Other (see comment) (Currently in process of  ROSA Hall  Case Management Department  Ph: [phone]

## 2025-06-04 NOTE — PLAN OF CARE
Problem: Discharge Planning  Goal: Discharge to home or other facility with appropriate resources  6/4/2025 0734 by Xiomara Bernal RN  Outcome: Progressing  6/4/2025 0048 by Sravani Anguiano RN  Outcome: Progressing     Problem: Pain  Goal: Verbalizes/displays adequate comfort level or baseline comfort level  6/4/2025 0734 by Xiomara Bernal RN  Outcome: Progressing  6/4/2025 0048 by Sravani Anguiano RN  Outcome: Progressing     Problem: Safety - Adult  Goal: Free from fall injury  6/4/2025 0734 by Xiomara Bernal RN  Outcome: Progressing  6/4/2025 0048 by Sravani Anguiano RN  Outcome: Progressing

## 2025-06-04 NOTE — H&P
[facility] Medicine History & Physical    V 5.1    Date of Admission: 6/3/2025    Date of Service:  Pt seen/examined on 6/3/2025     []Admitted to Inpatient with expected LOS greater than two midnights due to medical therapy.  [x]Placed in Observation status.    Chief Admission Complaint:  chest pain    Presenting Admission History:      64 y.o. female who presented to [facility] with chest pain.  PMHx significant for hypertension, CAD, hyperlipidemia, GERD.  History obtained from the patient and review of EMR.  Patient stated she has been experiencing intermittent chest pain for the last 3 days.  She describes the pain as midsternal, sharp and radiates under her left breast.  She does report associated shortness of breath, especially on exertion.  Patient stated she does have a history of cardiac disease and does have 3 stents placed.  She reports prior to going to the emergency department this evening that her chest pain was more of a \"tightness.  The patient stated she took some nitroglycerin which she did get some relief. In the emergency department the patient had a negative troponin x 2 as well as a nonischemic EKG.  She was given aspirin and nitro ointment was placed to chest wall.  Upon further evaluation, the patient was found to be hypokalemic.  This was replaced.  She was admitted for further evaluation and treatment.  A stress test and echocardiogram have been ordered for the a.m. The patient denied any other associated symptoms as well as any aggravating and/or alleviating factors. At the time of this assessment, the patient was resting comfortably in bed.  She currently denies any chest pain, back pain, abdominal pain, shortness of breath, numbness, tingling, N/V/C/D, fever and/or chills.      Assessment/Plan:      Chest pain - Initially concerning for ACS but possibly secondary to CAD w/ chronic angina.  Initial Troponin not elevated.  EKG non-ischemic.  Follow serial Troponins,

## 2025-06-05 PROBLEM — I95.9 HYPOTENSION: Status: ACTIVE | Noted: 2025-06-05

## 2025-06-05 LAB
ANION GAP SERPL CALCULATED.3IONS-SCNC: 10 MMOL/L (ref 3–16)
ANION GAP SERPL CALCULATED.3IONS-SCNC: 7 MMOL/L (ref 3–16)
ANION GAP SERPL CALCULATED.3IONS-SCNC: 8 MMOL/L (ref 3–16)
BASE EXCESS BLDA CALC-SCNC: -4 MMOL/L (ref -3–3)
BASE EXCESS BLDV CALC-SCNC: -4.7 MMOL/L (ref -3–3)
BUN SERPL-MCNC: 12 MG/DL (ref 7–20)
BUN SERPL-MCNC: 14 MG/DL (ref 7–20)
BUN SERPL-MCNC: 16 MG/DL (ref 7–20)
CALCIUM SERPL-MCNC: 8.3 MG/DL (ref 8.3–10.6)
CALCIUM SERPL-MCNC: 8.7 MG/DL (ref 8.3–10.6)
CALCIUM SERPL-MCNC: 8.9 MG/DL (ref 8.3–10.6)
CHLORIDE SERPL-SCNC: 103 MMOL/L (ref 99–110)
CHLORIDE SERPL-SCNC: 109 MMOL/L (ref 99–110)
CHLORIDE SERPL-SCNC: 109 MMOL/L (ref 99–110)
CO2 BLDA-SCNC: 25 MMOL/L
CO2 BLDV-SCNC: 25 MMOL/L
CO2 SERPL-SCNC: 23 MMOL/L (ref 21–32)
CO2 SERPL-SCNC: 23 MMOL/L (ref 21–32)
CO2 SERPL-SCNC: 27 MMOL/L (ref 21–32)
COHGB MFR BLDV: 1.6 % (ref 0–1.5)
CREAT SERPL-MCNC: 1.2 MG/DL (ref 0.6–1.2)
CREAT SERPL-MCNC: 1.3 MG/DL (ref 0.6–1.2)
CREAT SERPL-MCNC: 1.3 MG/DL (ref 0.6–1.2)
DEPRECATED RDW RBC AUTO: 15.6 % (ref 12.4–15.4)
DEPRECATED RDW RBC AUTO: 15.8 % (ref 12.4–15.4)
ECHO BSA: 1.93 M2
EKG ATRIAL RATE: 49 BPM
EKG ATRIAL RATE: 53 BPM
EKG ATRIAL RATE: 62 BPM
EKG DIAGNOSIS: NORMAL
EKG P AXIS: 40 DEGREES
EKG P AXIS: 44 DEGREES
EKG P AXIS: 66 DEGREES
EKG P-R INTERVAL: 176 MS
EKG P-R INTERVAL: 180 MS
EKG P-R INTERVAL: 180 MS
EKG Q-T INTERVAL: 414 MS
EKG Q-T INTERVAL: 442 MS
EKG Q-T INTERVAL: 458 MS
EKG QRS DURATION: 94 MS
EKG QRS DURATION: 96 MS
EKG QRS DURATION: 98 MS
EKG QTC CALCULATION (BAZETT): 413 MS
EKG QTC CALCULATION (BAZETT): 414 MS
EKG QTC CALCULATION (BAZETT): 420 MS
EKG R AXIS: 62 DEGREES
EKG R AXIS: 65 DEGREES
EKG R AXIS: 69 DEGREES
EKG T AXIS: 59 DEGREES
EKG T AXIS: 66 DEGREES
EKG T AXIS: 66 DEGREES
EKG VENTRICULAR RATE: 49 BPM
EKG VENTRICULAR RATE: 53 BPM
EKG VENTRICULAR RATE: 62 BPM
GFR SERPLBLD CREATININE-BSD FMLA CKD-EPI: 45 ML/MIN/{1.73_M2}
GFR SERPLBLD CREATININE-BSD FMLA CKD-EPI: 45 ML/MIN/{1.73_M2}
GFR SERPLBLD CREATININE-BSD FMLA CKD-EPI: 50 ML/MIN/{1.73_M2}
GLUCOSE BLD-MCNC: 106 MG/DL (ref 70–99)
GLUCOSE BLD-MCNC: 129 MG/DL (ref 70–99)
GLUCOSE BLD-MCNC: 139 MG/DL (ref 70–99)
GLUCOSE BLD-MCNC: 199 MG/DL (ref 70–99)
GLUCOSE SERPL-MCNC: 102 MG/DL (ref 70–99)
GLUCOSE SERPL-MCNC: 123 MG/DL (ref 70–99)
GLUCOSE SERPL-MCNC: 135 MG/DL (ref 70–99)
HCO3 BLDA-SCNC: 23.7 MMOL/L (ref 21–29)
HCO3 BLDV-SCNC: 23.2 MMOL/L (ref 23–29)
HCT VFR BLD AUTO: 38 % (ref 36–48)
HCT VFR BLD AUTO: 39.3 % (ref 36–48)
HGB BLD-MCNC: 12.5 G/DL (ref 12–16)
HGB BLD-MCNC: 13.1 G/DL (ref 12–16)
LACTATE BLDV-SCNC: 1.1 MMOL/L (ref 0.4–2)
MCH RBC QN AUTO: 32 PG (ref 26–34)
MCH RBC QN AUTO: 32.4 PG (ref 26–34)
MCHC RBC AUTO-ENTMCNC: 32.9 G/DL (ref 31–36)
MCHC RBC AUTO-ENTMCNC: 33.2 G/DL (ref 31–36)
MCV RBC AUTO: 96.3 FL (ref 80–100)
MCV RBC AUTO: 98.3 FL (ref 80–100)
METHGB MFR BLDV: 0.3 %
O2 THERAPY: ABNORMAL
PCO2 BLDA: 57.7 MM HG (ref 35–45)
PCO2 BLDV: 54.4 MMHG (ref 40–50)
PERFORMED ON: ABNORMAL
PH BLDA: 7.22 [PH] (ref 7.35–7.45)
PH BLDV: 7.25 [PH] (ref 7.35–7.45)
PLATELET # BLD AUTO: 179 K/UL (ref 135–450)
PLATELET # BLD AUTO: 199 K/UL (ref 135–450)
PMV BLD AUTO: 9.5 FL (ref 5–10.5)
PMV BLD AUTO: 9.9 FL (ref 5–10.5)
PO2 BLDA: 70.6 MM HG (ref 75–108)
PO2 BLDV: 46.6 MMHG (ref 25–40)
POC SAMPLE TYPE: ABNORMAL
POTASSIUM SERPL-SCNC: 4.5 MMOL/L (ref 3.5–5.1)
POTASSIUM SERPL-SCNC: 4.6 MMOL/L (ref 3.5–5.1)
POTASSIUM SERPL-SCNC: 6.2 MMOL/L (ref 3.5–5.1)
RBC # BLD AUTO: 3.86 M/UL (ref 4–5.2)
RBC # BLD AUTO: 4.08 M/UL (ref 4–5.2)
SAO2 % BLDA: 90 % (ref 93–100)
SAO2 % BLDV: 77 %
SODIUM SERPL-SCNC: 139 MMOL/L (ref 136–145)
SODIUM SERPL-SCNC: 140 MMOL/L (ref 136–145)
SODIUM SERPL-SCNC: 140 MMOL/L (ref 136–145)
WBC # BLD AUTO: 5.2 K/UL (ref 4–11)
WBC # BLD AUTO: 7.5 K/UL (ref 4–11)

## 2025-06-05 PROCEDURE — 82947 ASSAY GLUCOSE BLOOD QUANT: CPT

## 2025-06-05 PROCEDURE — 2580000003 HC RX 258: Performed by: INTERNAL MEDICINE

## 2025-06-05 PROCEDURE — 6360000002 HC RX W HCPCS: Performed by: INTERNAL MEDICINE

## 2025-06-05 PROCEDURE — 93458 L HRT ARTERY/VENTRICLE ANGIO: CPT | Performed by: INTERNAL MEDICINE

## 2025-06-05 PROCEDURE — 85027 COMPLETE CBC AUTOMATED: CPT

## 2025-06-05 PROCEDURE — C1894 INTRO/SHEATH, NON-LASER: HCPCS | Performed by: INTERNAL MEDICINE

## 2025-06-05 PROCEDURE — 6360000002 HC RX W HCPCS: Performed by: HOSPITALIST

## 2025-06-05 PROCEDURE — G0378 HOSPITAL OBSERVATION PER HR: HCPCS

## 2025-06-05 PROCEDURE — 99152 MOD SED SAME PHYS/QHP 5/>YRS: CPT | Performed by: INTERNAL MEDICINE

## 2025-06-05 PROCEDURE — 2000000000 HC ICU R&B

## 2025-06-05 PROCEDURE — 36415 COLL VENOUS BLD VENIPUNCTURE: CPT

## 2025-06-05 PROCEDURE — 6370000000 HC RX 637 (ALT 250 FOR IP): Performed by: INTERNAL MEDICINE

## 2025-06-05 PROCEDURE — B2111ZZ FLUOROSCOPY OF MULTIPLE CORONARY ARTERIES USING LOW OSMOLAR CONTRAST: ICD-10-PCS | Performed by: INTERNAL MEDICINE

## 2025-06-05 PROCEDURE — 80048 BASIC METABOLIC PNL TOTAL CA: CPT

## 2025-06-05 PROCEDURE — 6360000002 HC RX W HCPCS: Performed by: NURSE PRACTITIONER

## 2025-06-05 PROCEDURE — 2500000003 HC RX 250 WO HCPCS: Performed by: HOSPITALIST

## 2025-06-05 PROCEDURE — 99153 MOD SED SAME PHYS/QHP EA: CPT | Performed by: INTERNAL MEDICINE

## 2025-06-05 PROCEDURE — 93010 ELECTROCARDIOGRAM REPORT: CPT | Performed by: INTERNAL MEDICINE

## 2025-06-05 PROCEDURE — C1769 GUIDE WIRE: HCPCS | Performed by: INTERNAL MEDICINE

## 2025-06-05 PROCEDURE — 6360000004 HC RX CONTRAST MEDICATION: Performed by: INTERNAL MEDICINE

## 2025-06-05 PROCEDURE — 96372 THER/PROPH/DIAG INJ SC/IM: CPT

## 2025-06-05 PROCEDURE — 5A09357 ASSISTANCE WITH RESPIRATORY VENTILATION, LESS THAN 24 CONSECUTIVE HOURS, CONTINUOUS POSITIVE AIRWAY PRESSURE: ICD-10-PCS | Performed by: INTERNAL MEDICINE

## 2025-06-05 PROCEDURE — 2709999900 HC NON-CHARGEABLE SUPPLY: Performed by: INTERNAL MEDICINE

## 2025-06-05 PROCEDURE — 2500000003 HC RX 250 WO HCPCS: Performed by: NURSE PRACTITIONER

## 2025-06-05 PROCEDURE — 7100000011 HC PHASE II RECOVERY - ADDTL 15 MIN: Performed by: INTERNAL MEDICINE

## 2025-06-05 PROCEDURE — 87040 BLOOD CULTURE FOR BACTERIA: CPT

## 2025-06-05 PROCEDURE — 4A023N7 MEASUREMENT OF CARDIAC SAMPLING AND PRESSURE, LEFT HEART, PERCUTANEOUS APPROACH: ICD-10-PCS | Performed by: INTERNAL MEDICINE

## 2025-06-05 PROCEDURE — 2700000000 HC OXYGEN THERAPY PER DAY

## 2025-06-05 PROCEDURE — 94761 N-INVAS EAR/PLS OXIMETRY MLT: CPT

## 2025-06-05 PROCEDURE — 6370000000 HC RX 637 (ALT 250 FOR IP): Performed by: HOSPITALIST

## 2025-06-05 PROCEDURE — 2500000003 HC RX 250 WO HCPCS: Performed by: INTERNAL MEDICINE

## 2025-06-05 PROCEDURE — 83605 ASSAY OF LACTIC ACID: CPT

## 2025-06-05 PROCEDURE — 6370000000 HC RX 637 (ALT 250 FOR IP): Performed by: NURSE PRACTITIONER

## 2025-06-05 PROCEDURE — 82803 BLOOD GASES ANY COMBINATION: CPT

## 2025-06-05 PROCEDURE — 93005 ELECTROCARDIOGRAM TRACING: CPT | Performed by: INTERNAL MEDICINE

## 2025-06-05 PROCEDURE — 7100000010 HC PHASE II RECOVERY - FIRST 15 MIN: Performed by: INTERNAL MEDICINE

## 2025-06-05 PROCEDURE — 5A09357 ASSISTANCE WITH RESPIRATORY VENTILATION, LESS THAN 24 CONSECUTIVE HOURS, CONTINUOUS POSITIVE AIRWAY PRESSURE: ICD-10-PCS

## 2025-06-05 RX ORDER — FENTANYL CITRATE 50 UG/ML
INJECTION, SOLUTION INTRAMUSCULAR; INTRAVENOUS PRN
Status: DISCONTINUED | OUTPATIENT
Start: 2025-06-05 | End: 2025-06-05 | Stop reason: HOSPADM

## 2025-06-05 RX ORDER — DEXTROSE MONOHYDRATE 25 G/50ML
25 INJECTION, SOLUTION INTRAVENOUS ONCE
Status: COMPLETED | OUTPATIENT
Start: 2025-06-05 | End: 2025-06-05

## 2025-06-05 RX ORDER — SODIUM CHLORIDE 0.9 % (FLUSH) 0.9 %
5-40 SYRINGE (ML) INJECTION EVERY 12 HOURS SCHEDULED
Status: DISCONTINUED | OUTPATIENT
Start: 2025-06-05 | End: 2025-06-05 | Stop reason: HOSPADM

## 2025-06-05 RX ORDER — HEPARIN SODIUM 1000 [USP'U]/ML
INJECTION, SOLUTION INTRAVENOUS; SUBCUTANEOUS PRN
Status: DISCONTINUED | OUTPATIENT
Start: 2025-06-05 | End: 2025-06-05 | Stop reason: HOSPADM

## 2025-06-05 RX ORDER — 0.9 % SODIUM CHLORIDE 0.9 %
1000 INTRAVENOUS SOLUTION INTRAVENOUS ONCE
Status: COMPLETED | OUTPATIENT
Start: 2025-06-05 | End: 2025-06-05

## 2025-06-05 RX ORDER — SODIUM CHLORIDE 0.9 % (FLUSH) 0.9 %
5-40 SYRINGE (ML) INJECTION PRN
Status: DISCONTINUED | OUTPATIENT
Start: 2025-06-05 | End: 2025-06-06

## 2025-06-05 RX ORDER — DOPAMINE HYDROCHLORIDE 320 MG/100ML
1-20 INJECTION, SOLUTION INTRAVENOUS CONTINUOUS
Status: DISCONTINUED | OUTPATIENT
Start: 2025-06-05 | End: 2025-06-05

## 2025-06-05 RX ORDER — MIDAZOLAM HYDROCHLORIDE 1 MG/ML
INJECTION, SOLUTION INTRAMUSCULAR; INTRAVENOUS PRN
Status: DISCONTINUED | OUTPATIENT
Start: 2025-06-05 | End: 2025-06-05 | Stop reason: HOSPADM

## 2025-06-05 RX ORDER — ACETAMINOPHEN 325 MG/1
650 TABLET ORAL EVERY 4 HOURS PRN
Status: DISCONTINUED | OUTPATIENT
Start: 2025-06-05 | End: 2025-06-10 | Stop reason: HOSPADM

## 2025-06-05 RX ORDER — SODIUM CHLORIDE 0.9 % (FLUSH) 0.9 %
5-40 SYRINGE (ML) INJECTION EVERY 12 HOURS SCHEDULED
Status: DISCONTINUED | OUTPATIENT
Start: 2025-06-05 | End: 2025-06-06

## 2025-06-05 RX ORDER — ASPIRIN 325 MG
325 TABLET ORAL ONCE
Status: COMPLETED | OUTPATIENT
Start: 2025-06-05 | End: 2025-06-05

## 2025-06-05 RX ORDER — LORAZEPAM 0.5 MG/1
0.5 TABLET ORAL
Status: DISCONTINUED | OUTPATIENT
Start: 2025-06-05 | End: 2025-06-05 | Stop reason: HOSPADM

## 2025-06-05 RX ORDER — 0.9 % SODIUM CHLORIDE 0.9 %
500 INTRAVENOUS SOLUTION INTRAVENOUS ONCE
Status: COMPLETED | OUTPATIENT
Start: 2025-06-05 | End: 2025-06-05

## 2025-06-05 RX ORDER — IOPAMIDOL 755 MG/ML
INJECTION, SOLUTION INTRAVASCULAR PRN
Status: DISCONTINUED | OUTPATIENT
Start: 2025-06-05 | End: 2025-06-05 | Stop reason: HOSPADM

## 2025-06-05 RX ORDER — SODIUM CHLORIDE 0.9 % (FLUSH) 0.9 %
5-40 SYRINGE (ML) INJECTION PRN
Status: DISCONTINUED | OUTPATIENT
Start: 2025-06-05 | End: 2025-06-05 | Stop reason: HOSPADM

## 2025-06-05 RX ORDER — ONDANSETRON 2 MG/ML
4 INJECTION INTRAMUSCULAR; INTRAVENOUS EVERY 6 HOURS PRN
Status: DISCONTINUED | OUTPATIENT
Start: 2025-06-05 | End: 2025-06-05 | Stop reason: HOSPADM

## 2025-06-05 RX ORDER — SODIUM CHLORIDE 9 MG/ML
INJECTION, SOLUTION INTRAVENOUS PRN
Status: DISCONTINUED | OUTPATIENT
Start: 2025-06-05 | End: 2025-06-05 | Stop reason: HOSPADM

## 2025-06-05 RX ORDER — SODIUM CHLORIDE 9 MG/ML
INJECTION, SOLUTION INTRAVENOUS PRN
Status: DISCONTINUED | OUTPATIENT
Start: 2025-06-05 | End: 2025-06-10 | Stop reason: HOSPADM

## 2025-06-05 RX ADMIN — ONDANSETRON 4 MG: 2 INJECTION INTRAMUSCULAR; INTRAVENOUS at 18:09

## 2025-06-05 RX ADMIN — DOPAMINE HYDROCHLORIDE 5 MCG/KG/MIN: 40 INJECTION, SOLUTION, CONCENTRATE INTRAVENOUS at 17:58

## 2025-06-05 RX ADMIN — Medication 10 ML: at 09:25

## 2025-06-05 RX ADMIN — TRAMADOL HYDROCHLORIDE 50 MG: 50 TABLET, COATED ORAL at 09:29

## 2025-06-05 RX ADMIN — DEXTROSE MONOHYDRATE 25 G: 25 INJECTION, SOLUTION INTRAVENOUS at 20:24

## 2025-06-05 RX ADMIN — LOSARTAN POTASSIUM 50 MG: 25 TABLET, FILM COATED ORAL at 08:44

## 2025-06-05 RX ADMIN — METOPROLOL SUCCINATE 100 MG: 50 TABLET, EXTENDED RELEASE ORAL at 08:44

## 2025-06-05 RX ADMIN — EPINEPHRINE 1 MCG/MIN: 1 INJECTION INTRAMUSCULAR; INTRAVENOUS; SUBCUTANEOUS at 20:11

## 2025-06-05 RX ADMIN — POTASSIUM CHLORIDE 20 MEQ: 1500 TABLET, EXTENDED RELEASE ORAL at 08:45

## 2025-06-05 RX ADMIN — VENLAFAXINE HYDROCHLORIDE 225 MG: 37.5 CAPSULE, EXTENDED RELEASE ORAL at 08:43

## 2025-06-05 RX ADMIN — PANTOPRAZOLE SODIUM 40 MG: 40 TABLET, DELAYED RELEASE ORAL at 08:45

## 2025-06-05 RX ADMIN — RANOLAZINE 1000 MG: 500 TABLET, FILM COATED, EXTENDED RELEASE ORAL at 08:45

## 2025-06-05 RX ADMIN — ZIPRASIDONE MESYLATE 10 MG: 20 INJECTION, POWDER, LYOPHILIZED, FOR SOLUTION INTRAMUSCULAR at 23:27

## 2025-06-05 RX ADMIN — NITROGLYCERIN 1 INCH: 20 OINTMENT TOPICAL at 11:36

## 2025-06-05 RX ADMIN — SODIUM CHLORIDE 500 ML: 0.9 INJECTION, SOLUTION INTRAVENOUS at 19:15

## 2025-06-05 RX ADMIN — ASPIRIN 325 MG: 325 TABLET ORAL at 13:52

## 2025-06-05 RX ADMIN — INSULIN HUMAN 10 UNITS: 100 INJECTION, SOLUTION PARENTERAL at 20:24

## 2025-06-05 RX ADMIN — DOPAMINE HYDROCHLORIDE 20 MCG/KG/MIN: 40 INJECTION, SOLUTION, CONCENTRATE INTRAVENOUS at 23:25

## 2025-06-05 RX ADMIN — SODIUM CHLORIDE 1000 ML: 0.9 INJECTION, SOLUTION INTRAVENOUS at 17:18

## 2025-06-05 RX ADMIN — NITROGLYCERIN 1 INCH: 20 OINTMENT TOPICAL at 05:28

## 2025-06-05 RX ADMIN — SODIUM CHLORIDE, PRESERVATIVE FREE 10 ML: 5 INJECTION INTRAVENOUS at 08:46

## 2025-06-05 RX ADMIN — ENOXAPARIN SODIUM 40 MG: 100 INJECTION SUBCUTANEOUS at 08:43

## 2025-06-05 ASSESSMENT — PAIN SCALES - GENERAL
PAINLEVEL_OUTOF10: 0
PAINLEVEL_OUTOF10: 4
PAINLEVEL_OUTOF10: 7
PAINLEVEL_OUTOF10: 4
PAINLEVEL_OUTOF10: 4
PAINLEVEL_OUTOF10: 7

## 2025-06-05 ASSESSMENT — PAIN DESCRIPTION - DESCRIPTORS
DESCRIPTORS: ACHING
DESCRIPTORS: HEAVINESS
DESCRIPTORS: TIGHTNESS

## 2025-06-05 ASSESSMENT — PAIN DESCRIPTION - ORIENTATION
ORIENTATION: MID;UPPER
ORIENTATION: LEFT
ORIENTATION: MID
ORIENTATION: LEFT

## 2025-06-05 ASSESSMENT — PAIN DESCRIPTION - LOCATION
LOCATION: CHEST
LOCATION: CHEST
LOCATION: BACK
LOCATION: CHEST
LOCATION: CHEST
LOCATION: SHOULDER;BACK

## 2025-06-05 NOTE — PROGRESS NOTES
Report given to patients nurse Keli LEE. Pt transferred to room 206 with this RN assisting, pt has tr band on. CMU called and monitor is on and verified.

## 2025-06-05 NOTE — PLAN OF CARE
Problem: Pain  Goal: Verbalizes/displays adequate comfort level or baseline comfort level  Outcome: Progressing  Note: Pt will be satisfied with pain control. Pt uses numeric pain rating scale with reassessments after pain med administration. Will continue to monitor progression throughout shift.      Problem: Safety - Adult  Goal: Free from fall injury  Outcome: Progressing  Note: Pt will remain free from falls throughout hospital stay. Fall precautions in place, bed alarm on, bed in lowest position with wheels locked and side rails 2/4 up. Room door open and hourly rounding completed. Will continue to monitor throughout shift.

## 2025-06-05 NOTE — PLAN OF CARE
Problem: Discharge Planning  Goal: Discharge to home or other facility with appropriate resources  6/4/2025 2034 by Sravani Anguiano RN  Outcome: Progressing  6/4/2025 0734 by Xiomara Bernal RN  Outcome: Progressing     Problem: Pain  Goal: Verbalizes/displays adequate comfort level or baseline comfort level  6/4/2025 2034 by Sravani Anguiano RN  Outcome: Progressing  6/4/2025 0734 by Xiomara Bernal RN  Outcome: Progressing     Problem: Safety - Adult  Goal: Free from fall injury  6/4/2025 2034 by Sravani Anguiano RN  Outcome: Progressing  6/4/2025 0734 by Xiomara Bernal RN  Outcome: Progressing

## 2025-06-05 NOTE — PROGRESS NOTES
Patient admitted to room 206 from cath lab.  Patient oriented to room, call light, bed rails, phone, lights and bathroom.  Patient instructed about the schedule of the day including: vital sign frequency, lab draws, possible tests, frequency of MD and staff rounds, including RN/MD rounding together at bedside, daily weights, and I &O's.  Patient instructed about prescribed diet, how to call and order meals, and television.   bed alarm in place, patient aware of placement and reason.  Telemetry box  in place, patient aware of placement and reason.  Bed locked, in lowest position, side rails up 2/4, call light within reach.  Will continue to monitor.      Vitals:    06/05/25 1628   BP: 93/65   Pulse: 58   Resp: 16   Temp:    SpO2: 96%     Right radial site intact, TR band in place. No bruising/bleeding/swelling at this time.     Writer offered to inform patient's son of room number, patient declined.

## 2025-06-05 NOTE — PROGRESS NOTES
PRELIMINARY PROCEDURE REPORT        INDICATION FOR PROCEDURE  Chest pain concerning for unstable angina, abnormal stress test      PROCEDURE FINDINGS  Successful left heart cath via right radial approach, access closed with TR band with excellent hemostasis  Widely patent RCA stent, mild to moderate nonobstructive CAD otherwise suggesting likely noncardiac cause of symptoms and false positive stress test  Normal left-sided filling pressures, LVEDP 12 mmHg adjusting normal volume status      PLAN/RECOMMENDATIONS  Workup noncardiac causes of chest pain  Continue aspirin 81, high intensity statin  Hold beta-blocker given episode of symptomatic bradycardia this morning, will assess the safety of restarting a smaller dose tomorrow based on telemetry overnight  Keep patient on telemetry, if experiences further symptomatic bradycardia, EP evaluation can be requested  Resume outpatient follow-up with cardiology following discharge      No complications.    See full report for details.      Yunier Phelps MD, FACC, [facility]  Interventional Cardiology  [facility]  [phone] (c)

## 2025-06-05 NOTE — CARE COORDINATION
CM update; LOS # 2: Followed by IM, Cardiology. Patient is scheduled for Cardiac Cath today at 1500. IPTA; No needs at this time Will follow.Krystal Hall RN

## 2025-06-05 NOTE — PROGRESS NOTES
Called for recurrent Symptomatic Bradycardia.   Post Angiogram, just had TR band removed.   HR into mid to upper 30s. SBP 70s. She is awake but not fully oriented. Speech is slurred and sounds like a word salad.   Volume expansion started with some gradual improvement in SBP, HR and mental status.   Last dose of home Toprol XL (100 mg) was given this AM at 0844; continue to hold.   Discussed with Dr. Phelps; recommend transfer over to ICU, initiate Dopamine support overnight and monitor on telemetry. Plan to consult EP in AM.

## 2025-06-05 NOTE — PROGRESS NOTES
1709- Received call from [facility] reporting pt's HR sustaining in the 30's. RN to bedside. Pt diaphoretic, BP 62/42 manually. Pt awake but not oriented; speaking but not making sense. Ordered stat EKG and notified Dr. Youssef.     1715- Dr. Youssef at bedside. Received order for 1L bolus. Dr. Youssef to speak with Dr. Phelps.     1730- Dr. Youssef returned to bedside. BP, HR, and mental status improving but still altered. Received orders to transfer to ICU for dopamine gtt. Notified Charge RN.     1731- Pt transferred to ICU. Report to Slime LEE. [facility] aware.

## 2025-06-05 NOTE — DISCHARGE INSTRUCTIONS
Cath Labs at  [facility]   Discharge Instructions        6/5/2025  Dea Georges   Date of Birth 1960       Activity:  No driving for 24 hours.  In 24 hours you may remove dressing and shower, wash site gently with soap and water and leave open to air  Avoid submerging your arm in sitting water for 5 days.  Do not use your right hand for 24 hours, then  No lifting more than 5 pounds for 5 days.   No lotions, powders, or ointments near site for 5 days.   No work/school for 5 days unless instructed otherwise by your cardiologist.    Diet:   Resume previous diet, if a cardiac diet is specified you will receive a handout with  general guidelines.   Drink extra non-alcoholic/decaffienated fluids for first 24 hours after your procedure.    Arm Management:  If bleeding occurs from the site or a hematoma (lump) begins to increase in size, apply pressure directly over the site, call 911 to return to the hospital.    Special Instructions:  Report any coolness or numbness in the arm  Report any chills, fever, itching, red bumps or rash   Report any of the following to the MD: drainage from the site, redness and/or swelling at the site, increased tenderness at the site   If you are currently taking Metformin or Metformin combination medications for Diabetes, hold your dose for 48 hours after your procedure.  Consult your Cardiologist before taking any NSAIDS, vitamin supplements, estrogen, or estrogen plus progestin.  Do not stop taking Plavix, Brilinta or Effient, without first consulting your cardiologist.    Sedation Discharge Instructions:  For the next 24 hours do not drive a car, operate machinery, power tools or kitchen appliances.    Do not drink alcohol; including beer or wine.    Do not make any important decisions or sign any important papers.  For the next 24 hours you can expect drowsiness, light-headed or dizziness, nausea/ vomiting, inability to concentrate, fatigue and desire to sleep.  We strongly  strange feeling in your back, neck or jaw, or upper belly or in one or both shoulders or arms.  Lightheadedness or sudden weakness.  A fast or irregular heartbeat.       After you call 911, the  may tell you to chew 1 adult-strength or 2 to 4                  low-dose aspirin. Wait for an ambulance. Do not try to drive yourself.  Call your doctor today if :  You have any trouble breathing.  Your chest pain gets worse.  You are dizzy or lightheaded, or you feel like you may faint.  You are not getting better as expected.  You are having new or different chest pain.

## 2025-06-05 NOTE — PROGRESS NOTES
Patient called with complaints of worsening chest pain 7/10. Feels like a pressure or heaviness.  Does not radiate.  Feels more short of breath.  Denies nausea, diaphoresis.    2L oxygen applied.  STAT EKG ordered, at bedside  Nitro paste applied    /701, HR 62      Cardiology on call notified

## 2025-06-05 NOTE — PROGRESS NOTES
[facility] Medicine Progress Note  V 5.17      Date of Admission: 6/3/2025    Hospital Day: 3      Chief Admission Complaint:  Chest pain    Subjective: Still having some intermittent chest pains this AM, but relieved by Nitro. Prior to angiogram, she had an episode of mild sinus bradycardia (HR 40-50) associated with some hypotension and diaphoresis. Nitro paste was removed. She was taken for Angiogram and BP improved. No PCI was required.     Presenting Admission History:       64 y.o. female who presented to [facility] with chest pain.  PMHx significant for hypertension, CAD, hyperlipidemia, GERD.  History obtained from the patient and review of EMR.  Patient stated she has been experiencing intermittent chest pain for the last 3 days.  She describes the pain as midsternal, sharp and radiates under her left breast.  She does report associated shortness of breath, especially on exertion.  Patient stated she does have a history of cardiac disease and does have 3 stents placed.  She reports prior to going to the emergency department this evening that her chest pain was more of a \"tightness.  The patient stated she took some nitroglycerin which she did get some relief. In the emergency department the patient had a negative troponin x 2 as well as a nonischemic EKG.  She was given aspirin and nitro ointment was placed to chest wall.  Upon further evaluation, the patient was found to be hypokalemic.  This was replaced.  She was admitted for further evaluation and treatment.       Assessment/Plan:      Chest pain - Initially concerning for ACS but possibly secondary to CAD w/ chronic angina.  Troponin not elevated.  EKG non-ischemic.  Hx of RCA PCI (6/2019). C 2/2024 at [facility] which demonstrated patent stents in the RCA and diagonal. NM Stress Test at [facility] 5/2024 was negative. Followed by [facility] Cardiology. Currently on ASA, Statin, BB, ARB, Imdur and Ranexa.  Cardiology consulted. ECHO showed

## 2025-06-05 NOTE — PROGRESS NOTES
RN notified by [facility] that HR dropped to 40's.  RN assessed patient, hypotensive BP 72/46.  Patient diaphoretic.  Difficult to arouse.   Attempted to call Dr. Youssef with no response.  Nitro paste removed    Rapid response called.   Dr. Youssef now at bedside.       PRINCESS MONTOYA RN

## 2025-06-05 NOTE — PROGRESS NOTES
Pt transferred to ICU from  for hypotension and bradycardia. Started on dopamine drip without much improvement. Pt responds to commands, good hand /foot strength, but very lethargic and diaphoretic. Dr. Youssef hospitalist and Dr. Phelps cardiologist at bedside. Blood work ordered, discussed possibly starting epi if dopamine doesn't work.

## 2025-06-05 NOTE — SEDATION DOCUMENTATION
chloride flush, sodium chloride, ondansetron **OR** ondansetron, acetaminophen **OR** acetaminophen, polyethylene glycol, sulfur hexafluoride microspheres, traMADol  Home Meds:   Prior to Admission medications    Medication Sig Start Date End Date Taking? Authorizing Provider   metoprolol succinate (TOPROL XL) 100 MG extended release tablet Take 1 tablet by mouth daily She already takes 100 mg at home 5/18/24  Yes Olga Benson MD   losartan (COZAAR) 100 MG tablet Take 0.5 tablets by mouth in the morning and at bedtime   Yes ProviderZoe MD   atorvastatin (LIPITOR) 80 MG tablet Take 1 tablet by mouth nightly 4/17/18  Yes Fanny Jerome APRN - CNP   isosorbide mononitrate (IMDUR) 30 MG extended release tablet Take 2 tablets by mouth daily   Yes ProviderZoe MD   nitroGLYCERIN (NITROSTAT) 0.4 MG SL tablet up to max of 3 total doses. If no relief after 1 dose, call 911. 3/6/18  Yes Marky Ivan MD   venlafaxine 225 MG extended release tablet Take 1 tablet by mouth daily (with breakfast)   Yes ProviderZoe MD   aspirin 81 MG tablet Take 1 tablet by mouth daily   Yes ProviderZoe MD   albuterol sulfate HFA (VENTOLIN HFA) 108 (90 Base) MCG/ACT inhaler Inhale 2 puffs into the lungs 4 times daily as needed for Shortness of Breath 3/6/25   Kolby Jones MD   ondansetron (ZOFRAN-ODT) 4 MG disintegrating tablet Take 1 tablet by mouth 3 times daily as needed for Nausea or Vomiting 2/23/25   Wero Gonzales Jr., PA   pantoprazole (PROTONIX) 40 MG tablet Take 1 tablet by mouth in the morning and at bedtime 9/20/24 10/20/24  Evelio Dockery DO   sucralfate (CARAFATE) 1 GM tablet Take 1 tablet by mouth 4 times daily  Patient not taking: Reported on 6/4/2025 9/20/24   Evelio Dockery DO   potassium chloride (KLOR-CON M) 20 MEQ extended release tablet Take 1 tablet by mouth 2 times daily for 5 days 9/1/24 9/6/24  Juan Archuleta MD   ranolazine (RANEXA) 1000 MG  extended release tablet Take 1 tablet by mouth 2 times daily 5/18/24 8/16/24  Olga Benson MD   ibuprofen (ADVIL;MOTRIN) 600 MG tablet Take 1 tablet by mouth 3 times daily as needed for Pain With meals 7/6/22 7/6/22  Taylor Rodriguez APRN - CNP   acetaminophen (TYLENOL) 500 MG tablet Take 1 tablet by mouth 4 times daily as needed for Pain 7/6/22 7/6/22  Taylor Rodriguez APRN - CNP   ALPRAZolam (XANAX) 0.5 MG tablet Take 1 tablet by mouth nightly as needed for Sleep.  Patient not taking: Reported on 6/4/2025    ProviderZoe MD   vitamin D (ERGOCALCIFEROL) 91106 units CAPS capsule Take 1 capsule by mouth once a week    Provider, MD Zoe     Coumadin Use Last 7 Days:  No  Antiplatelet drug therapy use last 7 days: Yes - Aspirin  Other anticoagulant use last 7 days: No  Additional Medication Information:  No      Pre-Sedation Documentation and Exam:   I have personally completed a history, physical exam & review of systems for this patient (see notes).  Vital signs have been reviewed (see flow sheet for vitals).  I have reviewed the patient's history and review of systems.    Mallampati Airway Assessment:  Mallampati Class II - (soft palate, fauces & uvula are visible)    Prior History of Anesthesia Complications:   none    ASA Classification:  Class 2    Sedation/ Anesthesia Plan:   intravenous sedation    Medications Planned:   midazolam (Versed) intravenously and fentanyl intravenously    Patient is an appropriate candidate for plan of sedation: yes    Electronically signed by BRIGIDA WAHL MD on 6/5/2025 at 1:20 PM

## 2025-06-06 LAB
ANION GAP SERPL CALCULATED.3IONS-SCNC: 7 MMOL/L (ref 3–16)
BASOPHILS # BLD: 0 K/UL (ref 0–0.2)
BASOPHILS NFR BLD: 0.1 %
BUN SERPL-MCNC: 16 MG/DL (ref 7–20)
CALCIUM SERPL-MCNC: 9.1 MG/DL (ref 8.3–10.6)
CHLORIDE SERPL-SCNC: 107 MMOL/L (ref 99–110)
CO2 SERPL-SCNC: 24 MMOL/L (ref 21–32)
CREAT SERPL-MCNC: 1.2 MG/DL (ref 0.6–1.2)
DEPRECATED RDW RBC AUTO: 15.2 % (ref 12.4–15.4)
EOSINOPHIL # BLD: 0 K/UL (ref 0–0.6)
EOSINOPHIL NFR BLD: 0 %
GFR SERPLBLD CREATININE-BSD FMLA CKD-EPI: 50 ML/MIN/{1.73_M2}
GLUCOSE SERPL-MCNC: 91 MG/DL (ref 70–99)
HCT VFR BLD AUTO: 36.7 % (ref 36–48)
HGB BLD-MCNC: 12.2 G/DL (ref 12–16)
LYMPHOCYTES # BLD: 1.2 K/UL (ref 1–5.1)
LYMPHOCYTES NFR BLD: 13.6 %
MCH RBC QN AUTO: 31.9 PG (ref 26–34)
MCHC RBC AUTO-ENTMCNC: 33.2 G/DL (ref 31–36)
MCV RBC AUTO: 96.2 FL (ref 80–100)
MONOCYTES # BLD: 0.8 K/UL (ref 0–1.3)
MONOCYTES NFR BLD: 9.4 %
NEUTROPHILS # BLD: 6.8 K/UL (ref 1.7–7.7)
NEUTROPHILS NFR BLD: 76.9 %
PLATELET # BLD AUTO: 161 K/UL (ref 135–450)
PMV BLD AUTO: 10.2 FL (ref 5–10.5)
POTASSIUM SERPL-SCNC: 4.5 MMOL/L (ref 3.5–5.1)
RBC # BLD AUTO: 3.82 M/UL (ref 4–5.2)
SODIUM SERPL-SCNC: 138 MMOL/L (ref 136–145)
WBC # BLD AUTO: 8.8 K/UL (ref 4–11)

## 2025-06-06 PROCEDURE — 99291 CRITICAL CARE FIRST HOUR: CPT | Performed by: INTERNAL MEDICINE

## 2025-06-06 PROCEDURE — 80048 BASIC METABOLIC PNL TOTAL CA: CPT

## 2025-06-06 PROCEDURE — 85025 COMPLETE CBC W/AUTO DIFF WBC: CPT

## 2025-06-06 PROCEDURE — 6360000002 HC RX W HCPCS: Performed by: HOSPITALIST

## 2025-06-06 PROCEDURE — 2700000000 HC OXYGEN THERAPY PER DAY

## 2025-06-06 PROCEDURE — 2580000003 HC RX 258: Performed by: HOSPITALIST

## 2025-06-06 PROCEDURE — 2500000003 HC RX 250 WO HCPCS: Performed by: NURSE PRACTITIONER

## 2025-06-06 PROCEDURE — 94761 N-INVAS EAR/PLS OXIMETRY MLT: CPT

## 2025-06-06 PROCEDURE — 2500000003 HC RX 250 WO HCPCS: Performed by: HOSPITALIST

## 2025-06-06 PROCEDURE — 36415 COLL VENOUS BLD VENIPUNCTURE: CPT

## 2025-06-06 PROCEDURE — 6370000000 HC RX 637 (ALT 250 FOR IP): Performed by: NURSE PRACTITIONER

## 2025-06-06 PROCEDURE — 2000000000 HC ICU R&B

## 2025-06-06 PROCEDURE — 6360000002 HC RX W HCPCS: Performed by: NURSE PRACTITIONER

## 2025-06-06 RX ORDER — DEXMEDETOMIDINE HYDROCHLORIDE 4 UG/ML
.1-1.5 INJECTION, SOLUTION INTRAVENOUS CONTINUOUS
Status: DISCONTINUED | OUTPATIENT
Start: 2025-06-06 | End: 2025-06-06

## 2025-06-06 RX ORDER — LORAZEPAM 2 MG/ML
1 INJECTION INTRAMUSCULAR ONCE
Status: COMPLETED | OUTPATIENT
Start: 2025-06-06 | End: 2025-06-06

## 2025-06-06 RX ADMIN — ZIPRASIDONE MESYLATE 10 MG: 20 INJECTION, POWDER, LYOPHILIZED, FOR SOLUTION INTRAMUSCULAR at 00:24

## 2025-06-06 RX ADMIN — RANOLAZINE 1000 MG: 500 TABLET, FILM COATED, EXTENDED RELEASE ORAL at 20:24

## 2025-06-06 RX ADMIN — PANTOPRAZOLE SODIUM 40 MG: 40 TABLET, DELAYED RELEASE ORAL at 20:24

## 2025-06-06 RX ADMIN — SODIUM CHLORIDE, PRESERVATIVE FREE 10 ML: 5 INJECTION INTRAVENOUS at 20:25

## 2025-06-06 RX ADMIN — SODIUM CHLORIDE, PRESERVATIVE FREE 10 ML: 5 INJECTION INTRAVENOUS at 15:14

## 2025-06-06 RX ADMIN — ATORVASTATIN CALCIUM 80 MG: 80 TABLET, FILM COATED ORAL at 20:24

## 2025-06-06 RX ADMIN — ENOXAPARIN SODIUM 40 MG: 100 INJECTION SUBCUTANEOUS at 15:14

## 2025-06-06 RX ADMIN — Medication 0.2 MCG/KG/HR: at 00:54

## 2025-06-06 RX ADMIN — POTASSIUM CHLORIDE 20 MEQ: 1500 TABLET, EXTENDED RELEASE ORAL at 20:24

## 2025-06-06 RX ADMIN — LORAZEPAM 1 MG: 2 INJECTION INTRAMUSCULAR; INTRAVENOUS at 00:24

## 2025-06-06 RX ADMIN — Medication 0.6 MCG/KG/HR: at 07:56

## 2025-06-06 ASSESSMENT — PAIN SCALES - GENERAL
PAINLEVEL_OUTOF10: 0
PAINLEVEL_OUTOF10: 0
PAINLEVEL_OUTOF10: 3

## 2025-06-06 NOTE — PROGRESS NOTES
06/05/25 2026   Settings/Measurements   PIP Observed 15 cm H20   IPAP 14 cmH20   CPAP/EPAP 6 cmH2O   Vt (Measured) 828 mL   Rate Ordered 14   Insp Rise Time (%) 1 %   FiO2  35 %   I Time/ I Time % 1 s   Minute Volume (L/min) 13 Liters   Mask Leak (lpm) 39 lpm   Patient's Home Machine No   Alarm Settings   Alarms On Y   Low Pressure (cmH2O) 6 cmH2O   High Pressure (cmH2O) 30 cmH2O   Apnea (secs) 20 secs   RR Low (bpm) 6   RR High (bpm) 40 br/min

## 2025-06-06 NOTE — PLAN OF CARE
Problem: Discharge Planning  Goal: Discharge to home or other facility with appropriate resources  Outcome: Progressing     Problem: Pain  Goal: Verbalizes/displays adequate comfort level or baseline comfort level  6/6/2025 0129 by Juana Cope RN  Outcome: Progressing  6/5/2025 1634 by Patti Bell RN  Outcome: Progressing  Note: Pt will be satisfied with pain control. Pt uses numeric pain rating scale with reassessments after pain med administration. Will continue to monitor progression throughout shift.      Problem: Safety - Adult  Goal: Free from fall injury  6/6/2025 0129 by Juana Cope RN  Outcome: Progressing  Flowsheets (Taken 6/6/2025 0126)  Free From Fall Injury: Instruct family/caregiver on patient safety  6/5/2025 1634 by Patti Bell RN  Outcome: Progressing  Note: Pt will remain free from falls throughout hospital stay. Fall precautions in place, bed alarm on, bed in lowest position with wheels locked and side rails 2/4 up. Room door open and hourly rounding completed. Will continue to monitor throughout shift.      Problem: Safety - Medical Restraint  Goal: Remains free of injury from restraints (Restraint for Interference with Medical Device)  Description: INTERVENTIONS:1. Determine that other, less restrictive measures have been tried or would not be effective before applying the restraint2. Evaluate the patient's condition at the time of restraint application3. Inform patient/family regarding the reason for restraint4. Q2H: Monitor safety, psychosocial status, comfort, nutrition and hydration  Outcome: Progressing  Flowsheets (Taken 6/6/2025 0000)  Remains free of injury from restraints (restraint for interference with medical device): Determine that other, less restrictive measures have been tried or would not be effective before applying the restraint     Problem: ABCDS Injury Assessment  Goal: Absence of physical injury  Outcome: Progressing

## 2025-06-06 NOTE — CARE COORDINATION
Chart reviewed. LOS day # 1. Admitted as inpatient. Followed by Im, Cards. S/p angio yest. Developed hypotension and Bradycardia- xfr to ICU LOC. Was on Bipap, now off. Agitation/combativeness overnight. Emile wrist restraints, ativan and Geodon given. Off epi and dopamine gtt. Possible EP consult per MD notes. Pt is IPTA. Will follow for needs pending progress.

## 2025-06-06 NOTE — PROGRESS NOTES
Shift: [id number]    Reason for ICU Admission: Pt transferred to C2 from A2 for hypotension and bradycardia    Most recent vitals: /64   Pulse 58   Temp 97.9 °F (36.6 °C) (Axillary)   Resp 13   Ht 1.651 m (5' 5\")   Wt 81.5 kg (179 lb 10.8 oz)   SpO2 100%   BMI 29.90 kg/m²      Drip rates at handoff:    dexmedeTOMIDine 0.6 mcg/kg/hr (06/06/25 0656)    sodium chloride      DOPamine (INTROPIN) 1,600 mcg/mL in sodium chloride 0.9 % 250 mL infusion Stopped (06/06/25 0135)    EPINEPHrine Stopped (06/06/25 0013)    sodium chloride         Current O2:   [] Room Air   [x] NC  3L   [] BiPaP    [] Vented  % Fi02,  Peep    Hospital Course:  ICU Day # 1 (06/06/2025):  - Pt started on BiPAP, placed on levar hugger- both discontinued  - Around 1130PM pt woke up agitated, pulling lines, fighting and trying to bite/hit/kick  - Pt given ativan x 1, geodon x 2, and started on precedex. Pt has bilateral wrist restraint  - Weaned off of epi and dopamine

## 2025-06-06 NOTE — PROGRESS NOTES
Shift: [id number]    Reason for ICU Admission:  Pt transferred to C2 from A2 for hypotension and bradycardia     Most recent vitals: BP (!) 100/56   Pulse 58   Temp 97.9 °F (36.6 °C) (Oral)   Resp 14   Ht 1.651 m (5' 5\")   Wt 81.5 kg (179 lb 10.8 oz)   SpO2 97%   BMI 29.90 kg/m²      Drip rates at handoff:    sodium chloride      DOPamine (INTROPIN) 1,600 mcg/mL in sodium chloride 0.9 % 250 mL infusion Stopped (06/06/25 0135)    EPINEPHrine Stopped (06/06/25 0013)    sodium chloride         Current O2:   [x] Room Air   [] NC  L   [] BiPaP    [] Vented  % Fi02,  Peep    Hospital Course:  ICU Day # 1 (06/06/2025):  - Pt started on BiPAP, placed on levar hugger- both discontinued  - Around 1130PM pt woke up agitated, pulling lines, fighting and trying to bite/hit/kick  - Pt given ativan x 1, geodon x 2, and started on precedex. Pt has bilateral wrist restraint  - Weaned off of epi and dopamine     ICU Day #1 Days (06/06/2025):  -Pt awake and pleasant with no recollection of yesterday's events  -Pt weaned off supplemental o2  -Remains off epi and dopamine  -Pt out of restraints  -

## 2025-06-06 NOTE — PROGRESS NOTES
Pt found taking BiPAP mask off, trying to rip out and interfere with medical equipment. Pt confused and disoriented x 4 at this time. Staff assist was called to get patient back into bed. Bilateral soft wrist restraints were applied and Geodon was given after multiple attempts at reorientation and redirection, per Dr. Cantor. No further orders at this time. Will continue with plan of care.    Electronically signed by Juana Cope RN on 6/5/25 at 11:30 PM EDT

## 2025-06-06 NOTE — PLAN OF CARE
Problem: Discharge Planning  Goal: Discharge to home or other facility with appropriate resources  Outcome: Progressing     Problem: Pain  Goal: Verbalizes/displays adequate comfort level or baseline comfort level  Outcome: Progressing     Problem: Safety - Adult  Goal: Free from fall injury  Outcome: Progressing     Problem: Safety - Medical Restraint  Goal: Remains free of injury from restraints (Restraint for Interference with Medical Device)  Description: INTERVENTIONS:1. Determine that other, less restrictive measures have been tried or would not be effective before applying the restraint2. Evaluate the patient's condition at the time of restraint application3. Inform patient/family regarding the reason for restraint4. Q2H: Monitor safety, psychosocial status, comfort, nutrition and hydration  Outcome: Progressing  Flowsheets  Taken 6/6/2025 1000  Remains free of injury from restraints (restraint for interference with medical device): Every 2 hours: Monitor safety, psychosocial status, comfort, nutrition and hydration  Taken 6/6/2025 0800  Remains free of injury from restraints (restraint for interference with medical device):   Determine that other, less restrictive measures have been tried or would not be effective before applying the restraint   Every 2 hours: Monitor safety, psychosocial status, comfort, nutrition and hydration     Problem: ABCDS Injury Assessment  Goal: Absence of physical injury  Outcome: Progressing     Problem: Confusion  Goal: Confusion, delirium, dementia, or psychosis is improved or at baseline  Description: INTERVENTIONS:1. Assess for possible contributors to thought disturbance, including medications, impaired vision or hearing, underlying metabolic abnormalities, dehydration, psychiatric diagnoses, and notify attending LIP2. [place] high risk fall precautions, as indicated3. Provide frequent short contacts to provide reality reorientation, refocusing and direction4. Decrease  environmental stimuli, including noise as appropriate5. Monitor and intervene to maintain adequate nutrition, hydration, elimination, sleep and activity6. If unable to ensure safety without constant attention obtain sitter and review sitter guidelines with assigned personnel7. Initiate Psychosocial CNS and Spiritual Care consult, as indicated  Outcome: Progressing

## 2025-06-06 NOTE — PROGRESS NOTES
of heart failure or cardiogenic cause of shock.    Continue to hold antihypertensives especially metoprolol given intermittent bradycardia and symptomatic hypotension  Continue aspirin 81, high intensity statin  If patient becomes bradycardic again, will consult EP although so far no evidence of heart block or other conduction abnormalities during these bradycardic episodes  Consider sepsis workup as potential cause for hypotension  Cardiology will continue to follow    All questions and concerns were addressed to the patient/family. Alternatives to my treatment as well as risks and benefits of proposed treatment were discussed and understood by patient/family.     Thank you for the consult, please call with questions.    Due to the high probability of clinically significant life threating deterioration of the patient's condition that required my urgent intervention, a total critical care time of 45 minutes was used. This time excludes any time that may have been spent performing procedures. This includes but not limited to vital sign monitoring, telemetry monitoring, continuous pulse oximety, IV medication, clinical response to the IV medications, documentation time , consultation time, interpretation of lab data, review of nursing notes and old record review.       Yunier Phelps MD, FAC, [facility]  Interventional Cardiology  [facility]  [phone] (c)  6/6/2025       Inadvertent computerized transcription errors may be present

## 2025-06-06 NOTE — PROGRESS NOTES
[facility] Medicine Progress Note  V 5.17      Date of Admission: 6/3/2025    Hospital Day: 4      Chief Admission Complaint:  Chest Pain     Subjective: Patient seen and examined.  She denies any knowledge of the events of overnight.  She denies any symptoms of chest pain, shortness of breath, lightheadedness or dizziness.    Presenting Admission History:       64 y.o. female who presented to [facility] with chest pain.  PMHx significant for hypertension, CAD, hyperlipidemia, GERD.  History obtained from the patient and review of EMR.  Patient stated she has been experiencing intermittent chest pain for the last 3 days.  She describes the pain as midsternal, sharp and radiates under her left breast.  She does report associated shortness of breath, especially on exertion.  Patient stated she does have a history of cardiac disease and does have 3 stents placed.  She reports prior to going to the emergency department this evening that her chest pain was more of a \"tightness.  The patient stated she took some nitroglycerin which she did get some relief. In the emergency department the patient had a negative troponin x 2 as well as a nonischemic EKG.  She was given aspirin and nitro ointment was placed to chest wall.  Upon further evaluation, the patient was found to be hypokalemic.  This was replaced.  She was admitted for further evaluation and treatment.      Assessment/Plan:      Chest Pain   Initially concerning for ACS but possibly secondary to CAD w/ chronic angina.  Troponin not elevated.  EKG non-ischemic.  Hx of RCA PCI (6/2019). C 2/2024 at [facility] which demonstrated patent stents in the RCA and diagonal. NM Stress Test at [facility] 5/2024 was negative. Followed by [facility]. Currently on ASA, Statin, BB, ARB, Imdur and Ranexa.  Cardiology consulted. ECHO showed normal LVEF. Stress test showed possible ischemic, moderate risk study. Cardiology recommended Angiogram, completed  13.1 12.5 12.2   HCT 39.3 38.0 36.7    179 161     Recent Labs     06/05/25  1901 06/05/25  2244 06/06/25  0447    140 138   K 6.2* 4.6 4.5    109 107   CO2 23 23 24   BUN 14 16 16   CREATININE 1.3* 1.3* 1.2   CALCIUM 8.3 8.7 9.1     Recent Labs     06/03/25  2300 06/04/25  0046 06/04/25  0152   TROPHS 9 12 9     No results for input(s): \"LABA1C\" in the last 72 hours.  No results for input(s): \"AST\", \"ALT\", \"BILIDIR\", \"BILITOT\", \"ALKPHOS\" in the last 72 hours.    Recent Labs     06/05/25  1901   LACTA 1.1       Urine Cultures:   Lab Results   Component Value Date/Time    LABURIN  11/15/2018 01:20 AM     <50,000 CFU/ml mixed skin/urogenital willis. No further workup     Blood Cultures:   Lab Results   Component Value Date/Time    BC  06/05/2025 07:23 PM     No Growth to date.  Any change in status will be called.     Lab Results   Component Value Date/Time    BLOODCULT2  06/05/2025 07:27 PM     No Growth to date.  Any change in status will be called.     Organism: No results found for: \"ORG\"      Karen Hinojosa, DO

## 2025-06-07 LAB
ANION GAP SERPL CALCULATED.3IONS-SCNC: 7 MMOL/L (ref 3–16)
BUN SERPL-MCNC: 17 MG/DL (ref 7–20)
CALCIUM SERPL-MCNC: 8.5 MG/DL (ref 8.3–10.6)
CHLORIDE SERPL-SCNC: 105 MMOL/L (ref 99–110)
CO2 SERPL-SCNC: 26 MMOL/L (ref 21–32)
CREAT SERPL-MCNC: 1.1 MG/DL (ref 0.6–1.2)
DEPRECATED RDW RBC AUTO: 15.2 % (ref 12.4–15.4)
GFR SERPLBLD CREATININE-BSD FMLA CKD-EPI: 56 ML/MIN/{1.73_M2}
GLUCOSE SERPL-MCNC: 79 MG/DL (ref 70–99)
HCT VFR BLD AUTO: 35.3 % (ref 36–48)
HGB BLD-MCNC: 11.9 G/DL (ref 12–16)
MCH RBC QN AUTO: 32.3 PG (ref 26–34)
MCHC RBC AUTO-ENTMCNC: 33.7 G/DL (ref 31–36)
MCV RBC AUTO: 95.8 FL (ref 80–100)
PLATELET # BLD AUTO: 162 K/UL (ref 135–450)
PMV BLD AUTO: 10 FL (ref 5–10.5)
POTASSIUM SERPL-SCNC: 4.2 MMOL/L (ref 3.5–5.1)
RBC # BLD AUTO: 3.69 M/UL (ref 4–5.2)
SODIUM SERPL-SCNC: 138 MMOL/L (ref 136–145)
WBC # BLD AUTO: 6.2 K/UL (ref 4–11)

## 2025-06-07 PROCEDURE — 6370000000 HC RX 637 (ALT 250 FOR IP): Performed by: NURSE PRACTITIONER

## 2025-06-07 PROCEDURE — 99233 SBSQ HOSP IP/OBS HIGH 50: CPT | Performed by: INTERNAL MEDICINE

## 2025-06-07 PROCEDURE — 85027 COMPLETE CBC AUTOMATED: CPT

## 2025-06-07 PROCEDURE — 80048 BASIC METABOLIC PNL TOTAL CA: CPT

## 2025-06-07 PROCEDURE — 2060000000 HC ICU INTERMEDIATE R&B

## 2025-06-07 PROCEDURE — 6370000000 HC RX 637 (ALT 250 FOR IP): Performed by: INTERNAL MEDICINE

## 2025-06-07 PROCEDURE — 6360000002 HC RX W HCPCS: Performed by: NURSE PRACTITIONER

## 2025-06-07 PROCEDURE — 2500000003 HC RX 250 WO HCPCS: Performed by: NURSE PRACTITIONER

## 2025-06-07 RX ADMIN — ATORVASTATIN CALCIUM 80 MG: 80 TABLET, FILM COATED ORAL at 20:12

## 2025-06-07 RX ADMIN — RANOLAZINE 1000 MG: 500 TABLET, FILM COATED, EXTENDED RELEASE ORAL at 08:33

## 2025-06-07 RX ADMIN — VENLAFAXINE HYDROCHLORIDE 225 MG: 37.5 CAPSULE, EXTENDED RELEASE ORAL at 08:32

## 2025-06-07 RX ADMIN — PANTOPRAZOLE SODIUM 40 MG: 40 TABLET, DELAYED RELEASE ORAL at 08:33

## 2025-06-07 RX ADMIN — ALPRAZOLAM 0.5 MG: 0.25 TABLET ORAL at 21:52

## 2025-06-07 RX ADMIN — ACETAMINOPHEN 650 MG: 325 TABLET ORAL at 23:20

## 2025-06-07 RX ADMIN — POTASSIUM CHLORIDE 20 MEQ: 1500 TABLET, EXTENDED RELEASE ORAL at 20:12

## 2025-06-07 RX ADMIN — ASPIRIN 81 MG: 81 TABLET, COATED ORAL at 08:32

## 2025-06-07 RX ADMIN — RANOLAZINE 1000 MG: 500 TABLET, FILM COATED, EXTENDED RELEASE ORAL at 20:12

## 2025-06-07 RX ADMIN — POTASSIUM CHLORIDE 20 MEQ: 1500 TABLET, EXTENDED RELEASE ORAL at 08:33

## 2025-06-07 RX ADMIN — SODIUM CHLORIDE, PRESERVATIVE FREE 10 ML: 5 INJECTION INTRAVENOUS at 20:14

## 2025-06-07 RX ADMIN — ENOXAPARIN SODIUM 40 MG: 100 INJECTION SUBCUTANEOUS at 08:31

## 2025-06-07 RX ADMIN — PANTOPRAZOLE SODIUM 40 MG: 40 TABLET, DELAYED RELEASE ORAL at 20:12

## 2025-06-07 ASSESSMENT — PAIN SCALES - GENERAL: PAINLEVEL_OUTOF10: 6

## 2025-06-07 ASSESSMENT — PAIN DESCRIPTION - LOCATION
LOCATION: HEAD
LOCATION: HEAD

## 2025-06-07 ASSESSMENT — PAIN DESCRIPTION - DESCRIPTORS: DESCRIPTORS: ACHING

## 2025-06-07 NOTE — PROGRESS NOTES
Pt settled into room 435. Necessities given/set up/bedside/bathroom. Pt is alert and orient stand by assist. Pt has remote/call light. Pt states cold. Thermostat increased and pt covered with warm blankets.     Telemetry confirmed sr 74 box 64    Pt states she is so much better and her admission event was very scary.     Pt has no complaints now. Pt resting in bed.

## 2025-06-07 NOTE — PLAN OF CARE
Problem: Discharge Planning  Goal: Discharge to home or other facility with appropriate resources  6/6/2025 2052 by Juana Cope RN  Outcome: Progressing  Flowsheets (Taken 6/6/2025 2000)  Discharge to home or other facility with appropriate resources: Identify barriers to discharge with patient and caregiver  6/6/2025 1707 by Evelio Blakely RN  Outcome: Progressing     Problem: Pain  Goal: Verbalizes/displays adequate comfort level or baseline comfort level  6/6/2025 2052 by Juana Cope RN  Outcome: Progressing  6/6/2025 1707 by Evelio Blakely RN  Outcome: Progressing     Problem: Safety - Adult  Goal: Free from fall injury  6/6/2025 2052 by Juana Cope RN  Outcome: Progressing  Flowsheets (Taken 6/6/2025 2049)  Free From Fall Injury: Instruct family/caregiver on patient safety  6/6/2025 1707 by Evelio Blakely RN  Outcome: Progressing     Problem: ABCDS Injury Assessment  Goal: Absence of physical injury  6/6/2025 2052 by Juana Cope RN  Outcome: Progressing  Flowsheets (Taken 6/6/2025 2049)  Absence of Physical Injury: Implement safety measures based on patient assessment  6/6/2025 1707 by Evelio Blakely RN  Outcome: Progressing     Problem: Confusion  Goal: Confusion, delirium, dementia, or psychosis is improved or at baseline  Description: INTERVENTIONS:1. Assess for possible contributors to thought disturbance, including medications, impaired vision or hearing, underlying metabolic abnormalities, dehydration, psychiatric diagnoses, and notify attending LIP2. [place] high risk fall precautions, as indicated3. Provide frequent short contacts to provide reality reorientation, refocusing and direction4. Decrease environmental stimuli, including noise as appropriate5. Monitor and intervene to maintain adequate nutrition, hydration, elimination, sleep and activity6. If unable to ensure safety without constant attention obtain sitter and review sitter guidelines with assigned personnel7.

## 2025-06-07 NOTE — PROGRESS NOTES
CARDIOLOGY PROGRESS NOTE      Patient Name: Dea Georges  Date of admission: 6/3/2025  9:01 PM  Admission Dx: Hypokalemia [E87.6]  Chest pain, unspecified type [R07.9]  Chest pain due to CAD [I25.119]  Chronic coronary microvascular dysfunction [I25.85]  Hypotension [I95.9]  Reason for Consult:  Chest pain  Requesting Physician: Franky Youssef MD  Primary Care physician: Dorota Bee MD    Subjective:     Dea Georges is a 64 y.o. woman with abnormal stress and CAD now s/p LHC showing patent RCA stent, mild to moderate CAD and no PCI targets.    Patient was seen by my colleague Dr Phelps yesterday who noted \"Cardiac cath yesterday without obstructive CAD and patent RCA stent suggesting noncardiac cause of symptoms and chest pain. She has remained quite lethargic since yesterday morning since even before cardiac cath. She was noted to have intermittent bradycardia and hypotension before the cardiac cath yesterday but remained hemodynamically stable during the procedure. Later in the afternoon, she became bradycardic and hypotensive again and required briefly dopamine infusion as well as epinephrine infusion overnight. Both drips have been weaned off now. Patient remains hemodynamically stable although lethargic and confused still. No overt clinical signs or symptoms of heart failure or cardiogenic cause of shock\".     06/07 -- Patient stable overnight and off all pressors.  She remains stable on telemetry also, no CP or SOB complaints this am.    Home Medications:  Were reviewed and are listed in nursing record and/or below  Prior to Admission medications    Medication Sig Start Date End Date Taking? Authorizing Provider   metoprolol succinate (TOPROL XL) 100 MG extended release tablet Take 1 tablet by mouth daily She already takes 100 mg at home 5/18/24  Yes Olga Benson MD   losartan (COZAAR) 100 MG tablet Take 0.5 tablets by mouth in the morning and at bedtime   Yes  ProviderZoe MD   atorvastatin (LIPITOR) 80 MG tablet Take 1 tablet by mouth nightly 4/17/18  Yes Fanny Jerome APRN - CNP   isosorbide mononitrate (IMDUR) 30 MG extended release tablet Take 2 tablets by mouth daily   Yes Zoe Castaneda MD   nitroGLYCERIN (NITROSTAT) 0.4 MG SL tablet up to max of 3 total doses. If no relief after 1 dose, call 911. 3/6/18  Yes Marky Ivan MD   venlafaxine 225 MG extended release tablet Take 1 tablet by mouth daily (with breakfast)   Yes ProviderZoe MD   aspirin 81 MG tablet Take 1 tablet by mouth daily   Yes ProviderZoe MD   albuterol sulfate HFA (VENTOLIN HFA) 108 (90 Base) MCG/ACT inhaler Inhale 2 puffs into the lungs 4 times daily as needed for Shortness of Breath 3/6/25   Kolby Jones MD   ondansetron (ZOFRAN-ODT) 4 MG disintegrating tablet Take 1 tablet by mouth 3 times daily as needed for Nausea or Vomiting 2/23/25   Wero Gonzales Jr., PA   pantoprazole (PROTONIX) 40 MG tablet Take 1 tablet by mouth in the morning and at bedtime 9/20/24 10/20/24  Evelio Dockery DO   sucralfate (CARAFATE) 1 GM tablet Take 1 tablet by mouth 4 times daily  Patient not taking: Reported on 6/4/2025 9/20/24   Evelio Dockery DO   potassium chloride (KLOR-CON M) 20 MEQ extended release tablet Take 1 tablet by mouth 2 times daily for 5 days 9/1/24 9/6/24  Juan Archuleta MD   ranolazine (RANEXA) 1000 MG extended release tablet Take 1 tablet by mouth 2 times daily 5/18/24 8/16/24  Olga Benson MD   ibuprofen (ADVIL;MOTRIN) 600 MG tablet Take 1 tablet by mouth 3 times daily as needed for Pain With meals 7/6/22 7/6/22  Taylor Rodriguez APRN - CNP   acetaminophen (TYLENOL) 500 MG tablet Take 1 tablet by mouth 4 times daily as needed for Pain 7/6/22 7/6/22  Taylor Rodriguez, APRN - CNP   ALPRAZolam (XANAX) 0.5 MG tablet Take 1 tablet by mouth nightly as needed for Sleep.  Patient not taking: Reported on 6/4/2025    Provider,

## 2025-06-07 NOTE — PROGRESS NOTES
[facility] Medicine Progress Note  V 5.17      Date of Admission: 6/3/2025    Hospital Day: 5      Chief Admission Complaint:  Chest Pain     Subjective: Patient seen and examined.  She denies any acute complaints.     Presenting Admission History:       64 y.o. female who presented to [facility] with chest pain.  PMHx significant for hypertension, CAD, hyperlipidemia, GERD.  History obtained from the patient and review of EMR.  Patient stated she has been experiencing intermittent chest pain for the last 3 days.  She describes the pain as midsternal, sharp and radiates under her left breast.  She does report associated shortness of breath, especially on exertion.  Patient stated she does have a history of cardiac disease and does have 3 stents placed.  She reports prior to going to the emergency department this evening that her chest pain was more of a \"tightness.  The patient stated she took some nitroglycerin which she did get some relief. In the emergency department the patient had a negative troponin x 2 as well as a nonischemic EKG.  She was given aspirin and nitro ointment was placed to chest wall.  Upon further evaluation, the patient was found to be hypokalemic.  This was replaced.  She was admitted for further evaluation and treatment.      Assessment/Plan:      Chest Pain   - Initially concerning for ACS but possibly secondary to CAD w/ chronic angina.    - Troponin not elevated.    - EKG non-ischemic.    - Hx of RCA PCI (6/2019). [facility] 2/2024 at [facility] which demonstrated patent stents in the RCA and diagonal.   - NM Stress Test at [facility] 5/2024 was negative.   - Currently on ASA, Statin, BB, ARB, Imdur and Ranexa.    - Cardiology consulted - Angiogram, completed 6/5 but only moderate non-obstructive CAD was noted. Continue medical management as no PCI indicated. Suspect false positive stress test and likely non-cardiac cause of chest pain.     Hypotension   Bradycardia   - Developed

## 2025-06-07 NOTE — PROGRESS NOTES
Shift: [id number]    Reason for ICU Admission:  Pt transferred to C2 from A2 for hypotension and bradycardia     Most recent vitals: /77   Pulse 67   Temp 97.9 °F (36.6 °C) (Axillary)   Resp 17   Ht 1.651 m (5' 5\")   Wt 80 kg (176 lb 5.9 oz)   SpO2 97%   BMI 29.35 kg/m²      Drip rates at handoff:    sodium chloride      DOPamine (INTROPIN) 1,600 mcg/mL in sodium chloride 0.9 % 250 mL infusion Stopped (06/06/25 0135)    EPINEPHrine Stopped (06/06/25 0013)    sodium chloride         Current O2:   [x] Room Air   [] NC  L   [] BiPaP    [] Vented  % Fi02,  Peep    Hospital Course:  ICU Day # 0 (06/05/2025):  - Pt started on BiPAP, placed on levar hugger- both discontinued  - Around 1130PM pt woke up agitated, pulling lines, fighting and trying to bite/hit/kick  - Pt given ativan x 1, geodon x 2, and started on precedex. Pt has bilateral wrist restraints.  - Weaned off of epi and dopamine.     ICU Day #1 Days (06/06/2025):  -Pt awake and pleasant with no recollection of yesterday's events  -Pt weaned off supplemental o2  -Remains off epi and dopamine  -Pt out of restraints    ICU Day #1 (6/6) Nights:  - No overnight events!

## 2025-06-07 NOTE — PLAN OF CARE
Problem: Discharge Planning  Goal: Discharge to home or other facility with appropriate resources  Outcome: Progressing     Problem: Pain  Goal: Verbalizes/displays adequate comfort level or baseline comfort level  Outcome: Progressing     Problem: Safety - Adult  Goal: Free from fall injury  Outcome: Progressing     Problem: Safety - Medical Restraint  Goal: Remains free of injury from restraints (Restraint for Interference with Medical Device)  Description: INTERVENTIONS:1. Determine that other, less restrictive measures have been tried or would not be effective before applying the restraint2. Evaluate the patient's condition at the time of restraint application3. Inform patient/family regarding the reason for restraint4. Q2H: Monitor safety, psychosocial status, comfort, nutrition and hydration  Outcome: Progressing     Problem: ABCDS Injury Assessment  Goal: Absence of physical injury  Outcome: Progressing     Problem: Confusion  Goal: Confusion, delirium, dementia, or psychosis is improved or at baseline  Description: INTERVENTIONS:1. Assess for possible contributors to thought disturbance, including medications, impaired vision or hearing, underlying metabolic abnormalities, dehydration, psychiatric diagnoses, and notify attending LIP2. [facility] high risk fall precautions, as indicated3. Provide frequent short contacts to provide reality reorientation, refocusing and direction4. Decrease environmental stimuli, including noise as appropriate5. Monitor and intervene to maintain adequate nutrition, hydration, elimination, sleep and activity6. If unable to ensure safety without constant attention obtain sitter and review sitter guidelines with assigned personnel7. Initiate Psychosocial CNS and Spiritual Care consult, as indicated  Outcome: Progressing

## 2025-06-08 LAB
ANION GAP SERPL CALCULATED.3IONS-SCNC: 9 MMOL/L (ref 3–16)
BUN SERPL-MCNC: 15 MG/DL (ref 7–20)
CALCIUM SERPL-MCNC: 8.7 MG/DL (ref 8.3–10.6)
CHLORIDE SERPL-SCNC: 102 MMOL/L (ref 99–110)
CO2 SERPL-SCNC: 26 MMOL/L (ref 21–32)
CREAT SERPL-MCNC: 1.1 MG/DL (ref 0.6–1.2)
GFR SERPLBLD CREATININE-BSD FMLA CKD-EPI: 56 ML/MIN/{1.73_M2}
GLUCOSE SERPL-MCNC: 87 MG/DL (ref 70–99)
POTASSIUM SERPL-SCNC: 4 MMOL/L (ref 3.5–5.1)
SODIUM SERPL-SCNC: 137 MMOL/L (ref 136–145)

## 2025-06-08 PROCEDURE — 97110 THERAPEUTIC EXERCISES: CPT

## 2025-06-08 PROCEDURE — 6370000000 HC RX 637 (ALT 250 FOR IP): Performed by: INTERNAL MEDICINE

## 2025-06-08 PROCEDURE — 6370000000 HC RX 637 (ALT 250 FOR IP): Performed by: NURSE PRACTITIONER

## 2025-06-08 PROCEDURE — 2060000000 HC ICU INTERMEDIATE R&B

## 2025-06-08 PROCEDURE — 99232 SBSQ HOSP IP/OBS MODERATE 35: CPT | Performed by: INTERNAL MEDICINE

## 2025-06-08 PROCEDURE — 80048 BASIC METABOLIC PNL TOTAL CA: CPT

## 2025-06-08 PROCEDURE — 6360000002 HC RX W HCPCS: Performed by: NURSE PRACTITIONER

## 2025-06-08 PROCEDURE — 97530 THERAPEUTIC ACTIVITIES: CPT

## 2025-06-08 PROCEDURE — 97161 PT EVAL LOW COMPLEX 20 MIN: CPT

## 2025-06-08 PROCEDURE — 6370000000 HC RX 637 (ALT 250 FOR IP)

## 2025-06-08 PROCEDURE — 97165 OT EVAL LOW COMPLEX 30 MIN: CPT

## 2025-06-08 PROCEDURE — 2500000003 HC RX 250 WO HCPCS: Performed by: NURSE PRACTITIONER

## 2025-06-08 RX ORDER — LOSARTAN POTASSIUM 25 MG/1
50 TABLET ORAL 2 TIMES DAILY
Status: DISCONTINUED | OUTPATIENT
Start: 2025-06-08 | End: 2025-06-10 | Stop reason: HOSPADM

## 2025-06-08 RX ADMIN — POTASSIUM CHLORIDE 20 MEQ: 1500 TABLET, EXTENDED RELEASE ORAL at 10:00

## 2025-06-08 RX ADMIN — SODIUM CHLORIDE, PRESERVATIVE FREE 10 ML: 5 INJECTION INTRAVENOUS at 10:01

## 2025-06-08 RX ADMIN — ASPIRIN 81 MG: 81 TABLET, COATED ORAL at 10:00

## 2025-06-08 RX ADMIN — SODIUM CHLORIDE, PRESERVATIVE FREE 10 ML: 5 INJECTION INTRAVENOUS at 19:47

## 2025-06-08 RX ADMIN — ENOXAPARIN SODIUM 40 MG: 100 INJECTION SUBCUTANEOUS at 10:00

## 2025-06-08 RX ADMIN — VENLAFAXINE HYDROCHLORIDE 225 MG: 37.5 CAPSULE, EXTENDED RELEASE ORAL at 10:00

## 2025-06-08 RX ADMIN — LOSARTAN POTASSIUM 50 MG: 25 TABLET, FILM COATED ORAL at 19:42

## 2025-06-08 RX ADMIN — RANOLAZINE 1000 MG: 500 TABLET, FILM COATED, EXTENDED RELEASE ORAL at 10:00

## 2025-06-08 RX ADMIN — POTASSIUM CHLORIDE 20 MEQ: 1500 TABLET, EXTENDED RELEASE ORAL at 19:42

## 2025-06-08 RX ADMIN — PANTOPRAZOLE SODIUM 40 MG: 40 TABLET, DELAYED RELEASE ORAL at 10:00

## 2025-06-08 RX ADMIN — PANTOPRAZOLE SODIUM 40 MG: 40 TABLET, DELAYED RELEASE ORAL at 19:42

## 2025-06-08 RX ADMIN — RANOLAZINE 1000 MG: 500 TABLET, FILM COATED, EXTENDED RELEASE ORAL at 19:42

## 2025-06-08 RX ADMIN — ATORVASTATIN CALCIUM 80 MG: 80 TABLET, FILM COATED ORAL at 19:42

## 2025-06-08 RX ADMIN — ONDANSETRON 4 MG: 2 INJECTION INTRAMUSCULAR; INTRAVENOUS at 10:12

## 2025-06-08 RX ADMIN — LOSARTAN POTASSIUM 50 MG: 25 TABLET, FILM COATED ORAL at 14:37

## 2025-06-08 NOTE — PLAN OF CARE
Problem: Discharge Planning  Goal: Discharge to home or other facility with appropriate resources  6/7/2025 2301 by Ashish De Jesus RN  Outcome: Progressing  6/7/2025 1727 by Maribel Quick RN  Outcome: Progressing     Problem: Pain  Goal: Verbalizes/displays adequate comfort level or baseline comfort level  6/7/2025 2301 by Ashish De Jesus RN  Outcome: Progressing  6/7/2025 1727 by Maribel Quick RN  Outcome: Progressing     Problem: Safety - Adult  Goal: Free from fall injury  6/7/2025 2301 by Ashish De Jesus RN  Outcome: Progressing  6/7/2025 1727 by Maribel Quick RN  Outcome: Progressing     Problem: Safety - Medical Restraint  Goal: Remains free of injury from restraints (Restraint for Interference with Medical Device)  Description: INTERVENTIONS:1. Determine that other, less restrictive measures have been tried or would not be effective before applying the restraint2. Evaluate the patient's condition at the time of restraint application3. Inform patient/family regarding the reason for restraint4. Q2H: Monitor safety, psychosocial status, comfort, nutrition and hydration  6/7/2025 2301 by Ashish De Jesus RN  Outcome: Progressing  6/7/2025 1727 by Maribel Quick RN  Outcome: Progressing     Problem: ABCDS Injury Assessment  Goal: Absence of physical injury  6/7/2025 2301 by Ashish De Jesus RN  Outcome: Progressing  6/7/2025 1727 by Maribel Quick RN  Outcome: Progressing     Problem: Confusion  Goal: Confusion, delirium, dementia, or psychosis is improved or at baseline  Description: INTERVENTIONS:1. Assess for possible contributors to thought disturbance, including medications, impaired vision or hearing, underlying metabolic abnormalities, dehydration, psychiatric diagnoses, and notify attending LIP2. [place] high risk fall precautions, as indicated3. Provide frequent short contacts to provide reality reorientation, refocusing and direction4. Decrease environmental stimuli, including

## 2025-06-08 NOTE — PROGRESS NOTES
Occupational Therapy  [address]  Occupational Therapy Initial Assessment    Name: Dea Georges  : 1960  MRN: [id number]  Date of Service: 2025    Discharge Recommendations:  SNF;   Therapy discharge recommendations take into account each patient's current medical complexities and are subject to input/oversight from the patient's healthcare team.   Barriers to Home Discharge:   [x] Steps to access home entry or bed/bath:   [x] Unable to transfer, ambulate, or propel wheelchair household distances without assist   [x] Limited available assist at home upon discharge      [x] Poor cognition/safety awareness for d/c to home alone      [x] Patient is at risk for falls due to: back pain    [x] Other: evicted from current home situation    If pt is unable to be seen after this session, please let this note serve as discharge summary.  Please see case management note for discharge disposition.  Thank you.              Patient Diagnosis(es): The primary encounter diagnosis was Chest pain, unspecified type. Diagnoses of Hypokalemia, Chronic coronary microvascular dysfunction, and Abnormal stress test were also pertinent to this visit.  Past Medical History:  has a past medical history of Arthritis, Asthma, CAD in native artery, and Hypertension.  Past Surgical History:  has a past surgical history that includes lipoma resection (Right); Appendectomy; pr laparoscopy surg cholecystectomy (N/A, 2018); and Coronary angioplasty with stent (Left, 2018).           Assessment     REQUIRES OT FOLLOW-UP: Yes  Activity Tolerance  Activity Tolerance: Patient limited by fatigue;Patient limited by pain  Activity Tolerance Comments: vitals stable on RA with postural changes     Plan  Occupational Therapy Plan  Times Per Week: 3-5x/ week  Current Treatment Recommendations: Strengthening, Balance training, Functional mobility training, Safety education & training, Pain management, Self-Care / ADL,

## 2025-06-08 NOTE — PROGRESS NOTES
Placed consults for toro and cm.   Pt states she was evicted last night.   She has friends moving her belongings to a storage.   Pt states she did not want to go back there anyway, \"theres no hot water and I can't bathe.\"   Then she went on to say that her landlord took her to court for shortage of rent payments.   Pt/ot recs snf. Pt aware.

## 2025-06-08 NOTE — PROGRESS NOTES
CARDIOLOGY PROGRESS NOTE      Patient Name: Dea Georges  Date of admission: 6/3/2025  9:01 PM  Admission Dx: Hypokalemia [E87.6]  Chest pain, unspecified type [R07.9]  Chest pain due to CAD [I25.119]  Chronic coronary microvascular dysfunction [I25.85]  Hypotension [I95.9]  Reason for Consult:  Chest pain  Requesting Physician: Franky Youssef MD  Primary Care physician: Dorota Bee MD    Subjective:     Dea Georges is a 64 y.o. woman with abnormal stress and CAD now s/p LHC showing patent RCA stent, mild to moderate CAD and no PCI targets.    Patient was seen by my colleague Dr Phelps yesterday who noted \"Cardiac cath yesterday without obstructive CAD and patent RCA stent suggesting noncardiac cause of symptoms and chest pain. She has remained quite lethargic since yesterday morning since even before cardiac cath. She was noted to have intermittent bradycardia and hypotension before the cardiac cath yesterday but remained hemodynamically stable during the procedure. Later in the afternoon, she became bradycardic and hypotensive again and required briefly dopamine infusion as well as epinephrine infusion overnight. Both drips have been weaned off now. Patient remains hemodynamically stable although lethargic and confused still. No overt clinical signs or symptoms of heart failure or cardiogenic cause of shock\".     06/07 -- Patient stable overnight and off all pressors.  She remains stable on telemetry also, no CP or SOB complaints this am.    06/08 -- Patient hypertensive overnight and heart rates have recovered. No CP or SOB.  Patient with increased anxiety because she states she is getting evicted from her trailer.    Home Medications:  Were reviewed and are listed in nursing record and/or below  Prior to Admission medications    Medication Sig Start Date End Date Taking? Authorizing Provider   metoprolol succinate (TOPROL XL) 100 MG extended release tablet Take 1 tablet by mouth  improved, can resume Losartan 50mg bid only for now and arrange for clinic FU to start beta blocker in outpatient setting  --Patient will need 2 week monitor at discharge  --Replete electrolytes as needed     Ok to DC home from cardiology standpoint with clinic FU in 2-4 weeks.    I will address the patient's cardiac risk factors and adjusted pharmacologic treatment as needed. In addition, I have reinforced the need for patient directed risk factor modification.  All questions and concerns were addressed to the patient/family. Alternatives to my treatment were discussed.     Thank you for allowing us to participate in the care of Dea Georges. Please call me with any questions [phone].    Tyler Martinez MD [facility]  Cardiovascular Disease  [facility] [place]  [phone] [place] Office  [phone] [place] Office  6/8/2025 11:06 AM

## 2025-06-08 NOTE — PROGRESS NOTES
1,000 mg Oral BID    venlafaxine  225 mg Oral Daily with breakfast    sodium chloride flush  5-40 mL IntraVENous 2 times per day    enoxaparin  40 mg SubCUTAneous Daily    lidocaine  1 patch TransDERmal Daily    aspirin  324 mg Oral Once     PRN Meds: sodium chloride, acetaminophen, albuterol sulfate HFA, ALPRAZolam, sodium chloride flush, sodium chloride, ondansetron **OR** ondansetron, polyethylene glycol, sulfur hexafluoride microspheres, traMADol      Physical Exam Performed:      General appearance:  No apparent distress  Respiratory:  Normal respiratory effort without tachypnea.   Cardiovascular:  Bradycardic w/ Regular rhythm.  Abdomen:  Soft, non-tender, non-distended. Bowel sounds normal  Musculoskeletal:  No edema  Neurologic:  Neurovascularly intact without focal sensory/motor deficits.  Psychiatric:  Alert and oriented    BP (!) 153/92   Pulse 75   Temp 97.7 °F (36.5 °C)   Resp 16   Ht 1.651 m (5' 5\")   Wt 80.9 kg (178 lb 4.8 oz)   SpO2 94%   BMI 29.67 kg/m²     Telemetry:      Personally reviewed and interpreted telemetry (Rhythm Strip) on 6/8/2025.  Patient is currently ON tele demonstrating NSR w/ controlled rate.    Diet: ADULT DIET; Regular    DVT Prophylaxis: PPX dose LMWH    Code status: Full Code    PT/OT Eval Status: Seen w/ recs for SNF    Multi-Disciplinary Rounds with Case Management completed on 6/8/2025 with the following recs:     Anticipated Discharge Location: [facility]     Anticipated Discharge Day/Date:  2-3 days     Barriers to Discharge: Placement decision and Precertification    Likely rate limiting factor: Placement and pre-CERT    --------------------------------------------------    MDM (any 2 required for High level billing)    A. Problems (any 1)  [x] Acute/Chronic Illness/injury posing ongoing threat to life and/or bodily function without ongoing treatment    [] Severe exacerbation of chronic illness    --------------------------------------------------  B. Risk of  \"BILITOT\", \"ALKPHOS\" in the last 72 hours.    Recent Labs     06/05/25  1901   LACTA 1.1       Urine Cultures:   Lab Results   Component Value Date/Time    LABURIN  11/15/2018 01:20 AM     <50,000 CFU/ml mixed skin/urogenital willis. No further workup     Blood Cultures:   Lab Results   Component Value Date/Time    BC  06/05/2025 07:23 PM     No Growth to date.  Any change in status will be called.     Lab Results   Component Value Date/Time    BLOODCULT2  06/05/2025 07:27 PM     No Growth to date.  Any change in status will be called.     Organism: No results found for: \"ORG\"      Karen Hinojosa, DO

## 2025-06-08 NOTE — PROGRESS NOTES
Physical Therapy  [address]  Physical Therapy Initial Assessment    Name: Dea Georges  : 1960  MRN: [id number]  Date of Service: 2025    Discharge Recommendations:  Subacute/Skilled Nursing Facility   PT Equipment Recommendations  Equipment Needed: No  Other: defer to next level of care.      Therapy discharge recommendations take into account each patient's current medical complexities and are subject to input/oversight from the patient's healthcare team.   Barriers to Home Discharge:   [x] Steps to access home entry or bed/bath:   [x] Unable to transfer, ambulate, or propel wheelchair household distances without assist   [x] Unable to perform ADLs without assist   [x] Limited available assist at home upon discharge    [] Patient or family requests d/c to post-acute facility    [] Poor cognition/safety awareness for d/c to home alone    [] Unable to maintain ordered weight bearing status    [] Patient with salient signs of long-standing immobility   [x] Other: pt with notable fatigue with mobility, pt at increased risk for falls, no assist at home.    Patient Diagnosis(es): The primary encounter diagnosis was Chest pain, unspecified type. Diagnoses of Hypokalemia, Chronic coronary microvascular dysfunction, and Abnormal stress test were also pertinent to this visit.  Past Medical History:  has a past medical history of Arthritis, Asthma, CAD in native artery, and Hypertension.  Past Surgical History:  has a past surgical history that includes lipoma resection (Right); Appendectomy; pr laparoscopy surg cholecystectomy (N/A, 2018); and Coronary angioplasty with stent (Left, 2018).    Assessment  Body Structures, Functions, Activity Limitations Requiring Skilled Therapeutic Intervention: Decreased functional mobility ;Decreased strength;Decreased safe awareness;Decreased cognition;Decreased endurance;Increased pain;Decreased posture;Decreased body mechanics;Decreased ADL  bed without using bedrails?: A Little  How much help is needed moving from lying on your back to sitting on the side of a flat bed without using bedrails?: A Little  How much help is needed moving to and from a bed to a chair?: A Little  How much help is needed standing up from a chair using your arms?: A Little  How much help is needed walking in hospital room?: A Little  How much help is needed climbing 3-5 steps with a railing?: A Lot  AM-PAC Inpatient Mobility Raw Score : 17  AM-PAC Inpatient T-Scale Score : 42.13  Mobility Inpatient CMS 0-100% Score: 50.57  Mobility Inpatient CMS G-Code Modifier : CK    Goals  Short Term Goals  Time Frame for Short Term Goals: 7 days (6/15) unless otherwise noted.  Short Term Goal 1: Pt will demonstrate SBA with bed mobility.  Short Term Goal 2: Pt will demonstrate SBA with functional transfers with LRAD.  Short Term Goal 3: Pt will demonstrate SBA for ambulation up to 50' with LRAD.  Short Term Goal 4: Pt will participate in 4 different BLE exercises of 12-15 reps each to improve strength and endurance by 6/13.  Patient Goals   Patient Goals : pt does not state goals secondary to decreased cognition.     Education  Patient Education  Education Given To: Patient  Education Provided: Role of Therapy;Plan of Care;Home Exercise Program;Transfer Training;Mobility Training;Fall Prevention Strategies  Education Provided Comments: educated pt on safety in hosptial, use of call light, not getting up alone/only getting up with assist, benefits to time spent OOB, answered all questions.  Education Method: Verbal  Barriers to Learning: Cognition  Education Outcome: Unable to verbalize;Unable to demonstrate understanding;Continued education needed    Therapy Time   Individual Concurrent Group Co-treatment   Time In       0914   Time Out       0948   Minutes       34   Timed Code Treatment Minutes: 24 Minutes, 10 minutes for initial evaluation.      Keith Flor, PT, DPT  If pt is unable

## 2025-06-08 NOTE — PROGRESS NOTES
Shift Summary    Admission Diagnosis: Chest pain due to CAD    Shift Events:  Patient wanted to leave in the evening sighting issues with her apartment eviction, reassured  will follow up today and given Atarax to calm down. But doesn't want the son to be informed of this. BP was elevated elevated but not on anti-hypertensive's, says she takes Lorsatan  twice a day 0.5 mg orally  Vitals:  Vitals:    06/07/25 2026 06/07/25 2300 06/08/25 0354 06/08/25 0626   BP: (!) 176/99 (!) 176/93 123/73    Pulse: 96  75    Resp:  18 16    Temp:  97.8 °F (36.6 °C) 97.4 °F (36.3 °C)    TempSrc:   Oral    SpO2:  96% 94%    Weight:    80.9 kg (178 lb 4.8 oz)   Height:            WEIGHT: Admit Weight - Scale: 81.4 kg (179 lb 6.4 oz)      Today  Weight - Scale: 80.9 kg (178 lb 4.8 oz)    Tele:  normal sinus     IV/Line:  Peripheral IV 06/03/25 Right Antecubital (Active)   Site Assessment Clean, dry & intact;Leaking 06/08/25 0600   Line Status Normal saline locked;Capped 06/08/25 0600   Line Care Connections checked and tightened 06/08/25 0600   Phlebitis Assessment No symptoms 06/08/25 0600   Infiltration Assessment 0 06/08/25 0600   Alcohol Cap Used Yes 06/08/25 0600   Dressing Status Clean, dry & intact 06/08/25 0600   Dressing Type Transparent 06/08/25 0600       Peripheral IV 06/06/25 Right;Anterior Forearm (Active)   Site Assessment Clean, dry & intact 06/08/25 0600   Line Status Normal saline locked;Capped 06/08/25 0600   Line Care Connections checked and tightened 06/08/25 0600   Phlebitis Assessment No symptoms 06/08/25 0600   Infiltration Assessment 0 06/08/25 0600   Alcohol Cap Used Yes 06/08/25 0600   Dressing Status Clean, dry & intact 06/08/25 0600   Dressing Type Transparent 06/08/25 0600          Drains/Chen:       Output by Drain (mL) 06/06/25 0701 - 06/06/25 1900 06/06/25 1901 - 06/07/25 0700 06/07/25 0701 - 06/07/25 1900 06/07/25 1901 - 06/08/25 0644   Patient has no LDAs of requested type attached.

## 2025-06-09 ENCOUNTER — ANCILLARY PROCEDURE (OUTPATIENT)
Dept: CARDIOLOGY CLINIC | Age: 65
End: 2025-06-09

## 2025-06-09 ENCOUNTER — TELEPHONE (OUTPATIENT)
Dept: CARDIAC CATH/INVASIVE PROCEDURES | Age: 65
End: 2025-06-09

## 2025-06-09 DIAGNOSIS — R00.1 BRADYCARDIA: ICD-10-CM

## 2025-06-09 LAB
ANION GAP SERPL CALCULATED.3IONS-SCNC: 9 MMOL/L (ref 3–16)
BACTERIA BLD CULT ORG #2: NORMAL
BACTERIA BLD CULT: NORMAL
BUN SERPL-MCNC: 15 MG/DL (ref 7–20)
CALCIUM SERPL-MCNC: 9.7 MG/DL (ref 8.3–10.6)
CHLORIDE SERPL-SCNC: 102 MMOL/L (ref 99–110)
CO2 SERPL-SCNC: 25 MMOL/L (ref 21–32)
CREAT SERPL-MCNC: 1 MG/DL (ref 0.6–1.2)
ECHO BSA: 1.93 M2
GFR SERPLBLD CREATININE-BSD FMLA CKD-EPI: 62 ML/MIN/{1.73_M2}
GLUCOSE SERPL-MCNC: 106 MG/DL (ref 70–99)
POTASSIUM SERPL-SCNC: 4.3 MMOL/L (ref 3.5–5.1)
SODIUM SERPL-SCNC: 136 MMOL/L (ref 136–145)

## 2025-06-09 PROCEDURE — 6370000000 HC RX 637 (ALT 250 FOR IP)

## 2025-06-09 PROCEDURE — 6370000000 HC RX 637 (ALT 250 FOR IP): Performed by: NURSE PRACTITIONER

## 2025-06-09 PROCEDURE — 2060000000 HC ICU INTERMEDIATE R&B

## 2025-06-09 PROCEDURE — 36415 COLL VENOUS BLD VENIPUNCTURE: CPT

## 2025-06-09 PROCEDURE — 80048 BASIC METABOLIC PNL TOTAL CA: CPT

## 2025-06-09 PROCEDURE — 2500000003 HC RX 250 WO HCPCS: Performed by: NURSE PRACTITIONER

## 2025-06-09 PROCEDURE — 6360000002 HC RX W HCPCS: Performed by: NURSE PRACTITIONER

## 2025-06-09 RX ADMIN — POTASSIUM CHLORIDE 20 MEQ: 1500 TABLET, EXTENDED RELEASE ORAL at 08:57

## 2025-06-09 RX ADMIN — ALPRAZOLAM 0.5 MG: 0.25 TABLET ORAL at 21:56

## 2025-06-09 RX ADMIN — ATORVASTATIN CALCIUM 80 MG: 80 TABLET, FILM COATED ORAL at 20:16

## 2025-06-09 RX ADMIN — LOSARTAN POTASSIUM 50 MG: 25 TABLET, FILM COATED ORAL at 20:16

## 2025-06-09 RX ADMIN — SODIUM CHLORIDE, PRESERVATIVE FREE 10 ML: 5 INJECTION INTRAVENOUS at 08:56

## 2025-06-09 RX ADMIN — PANTOPRAZOLE SODIUM 40 MG: 40 TABLET, DELAYED RELEASE ORAL at 20:16

## 2025-06-09 RX ADMIN — VENLAFAXINE HYDROCHLORIDE 225 MG: 37.5 CAPSULE, EXTENDED RELEASE ORAL at 08:57

## 2025-06-09 RX ADMIN — POTASSIUM CHLORIDE 20 MEQ: 1500 TABLET, EXTENDED RELEASE ORAL at 20:16

## 2025-06-09 RX ADMIN — LOSARTAN POTASSIUM 50 MG: 25 TABLET, FILM COATED ORAL at 08:57

## 2025-06-09 RX ADMIN — RANOLAZINE 1000 MG: 500 TABLET, FILM COATED, EXTENDED RELEASE ORAL at 08:57

## 2025-06-09 RX ADMIN — ALPRAZOLAM 0.5 MG: 0.25 TABLET ORAL at 02:27

## 2025-06-09 RX ADMIN — ASPIRIN 81 MG: 81 TABLET, COATED ORAL at 08:57

## 2025-06-09 RX ADMIN — ENOXAPARIN SODIUM 40 MG: 100 INJECTION SUBCUTANEOUS at 08:57

## 2025-06-09 RX ADMIN — PANTOPRAZOLE SODIUM 40 MG: 40 TABLET, DELAYED RELEASE ORAL at 08:57

## 2025-06-09 RX ADMIN — RANOLAZINE 1000 MG: 500 TABLET, FILM COATED, EXTENDED RELEASE ORAL at 20:16

## 2025-06-09 ASSESSMENT — PAIN SCALES - GENERAL
PAINLEVEL_OUTOF10: 0

## 2025-06-09 NOTE — PROGRESS NOTES
[facility] Medicine Progress Note  V 5.17      Date of Admission: 6/3/2025    Hospital Day: 7      Chief Admission Complaint:  Chest Pain     Subjective: Subtle confusion still noted. BP and HR are stable. Denies any chest pain or SOB.     Presenting Admission History:       64 y.o. female who presented to [facility] with chest pain.  PMHx significant for hypertension, CAD, hyperlipidemia, GERD.  History obtained from the patient and review of EMR.  Patient stated she has been experiencing intermittent chest pain for the last 3 days.  She describes the pain as midsternal, sharp and radiates under her left breast.  She does report associated shortness of breath, especially on exertion.  Patient stated she does have a history of cardiac disease and does have 3 stents placed.  She reports prior to going to the emergency department this evening that her chest pain was more of a \"tightness.  The patient stated she took some nitroglycerin which she did get some relief. In the emergency department the patient had a negative troponin x 2 as well as a nonischemic EKG.  She was given aspirin and nitro ointment was placed to chest wall.  Upon further evaluation, the patient was found to be hypokalemic.  This was replaced.  She was admitted for further evaluation and treatment.      Assessment/Plan:      Chest Pain   - Initially concerning for ACS but possibly secondary to CAD w/ chronic angina.    - Troponin not elevated.    - EKG non-ischemic.    - Hx of RCA PCI (6/2019). C 2/2024 at [facility] which demonstrated patent stents in the RCA and diagonal.   - NM Stress Test at [facility] 5/2024 was negative.   - Cardiology consulted - Angiogram, completed 6/5 but only moderate non-obstructive CAD was noted. Continue medical management as no PCI indicated. Suspect false positive stress test and likely non-cardiac cause of chest pain.   - Currently on ASA, Statin, BB, ARB, Imdur and Ranexa.      Hypotension  9.7     No results for input(s): \"PROBNP\", \"TROPHS\" in the last 72 hours.    No results for input(s): \"LABA1C\" in the last 72 hours.  No results for input(s): \"AST\", \"ALT\", \"BILIDIR\", \"BILITOT\", \"ALKPHOS\" in the last 72 hours.    No results for input(s): \"INR\", \"LACTA\", \"TSH\" in the last 72 hours.      Urine Cultures:   Lab Results   Component Value Date/Time    LABURIN  11/15/2018 01:20 AM     <50,000 CFU/ml mixed skin/urogenital willis. No further workup     Blood Cultures:   Lab Results   Component Value Date/Time    BC  06/05/2025 07:23 PM     No Growth to date.  Any change in status will be called.     Lab Results   Component Value Date/Time    BLOODCULT2  06/05/2025 07:27 PM     No Growth to date.  Any change in status will be called.     Organism: No results found for: \"ORG\"      Franky Youssef MD

## 2025-06-09 NOTE — PLAN OF CARE
Problem: Discharge Planning  Goal: Discharge to home or other facility with appropriate resources  Outcome: Progressing     Problem: Pain  Goal: Verbalizes/displays adequate comfort level or baseline comfort level  Outcome: Progressing     Problem: Safety - Adult  Goal: Free from fall injury  Outcome: Progressing     Problem: Safety - Medical Restraint  Goal: Remains free of injury from restraints (Restraint for Interference with Medical Device)  Description: INTERVENTIONS:1. Determine that other, less restrictive measures have been tried or would not be effective before applying the restraint2. Evaluate the patient's condition at the time of restraint application3. Inform patient/family regarding the reason for restraint4. Q2H: Monitor safety, psychosocial status, comfort, nutrition and hydration  Outcome: Progressing     Problem: ABCDS Injury Assessment  Goal: Absence of physical injury  Outcome: Progressing     Problem: Confusion  Goal: Confusion, delirium, dementia, or psychosis is improved or at baseline  Description: INTERVENTIONS:1. Assess for possible contributors to thought disturbance, including medications, impaired vision or hearing, underlying metabolic abnormalities, dehydration, psychiatric diagnoses, and notify attending LIP2. [place] high risk fall precautions, as indicated3. Provide frequent short contacts to provide reality reorientation, refocusing and direction4. Decrease environmental stimuli, including noise as appropriate5. Monitor and intervene to maintain adequate nutrition, hydration, elimination, sleep and activity6. If unable to ensure safety without constant attention obtain sitter and review sitter guidelines with assigned personnel7. Initiate Psychosocial CNS and Spiritual Care consult, as indicated  Outcome: Progressing     Problem: Neurosensory - Adult  Goal: Achieves stable or improved neurological status  Outcome: Progressing  Goal: Absence of seizures  Outcome:  Progressing  Goal: Remains free of injury related to seizures activity  Outcome: Progressing  Goal: Achieves maximal functionality and self care  Outcome: Progressing     Problem: Cardiovascular - Adult  Goal: Maintains optimal cardiac output and hemodynamic stability  Outcome: Progressing  Goal: Absence of cardiac dysrhythmias or at baseline  Outcome: Progressing     Problem: Skin/Tissue Integrity - Adult  Goal: Skin integrity remains intact  Outcome: Progressing  Goal: Incisions, wounds, or drain sites healing without S/S of infection  Outcome: Progressing  Goal: Oral mucous membranes remain intact  Outcome: Progressing

## 2025-06-09 NOTE — CARE COORDINATION
S/P Cardiac cath/PCI 6/7.Developed hypotension and bradycardia after procedure. Was moved to ICU and briefly on Dopamine and Epi gtts.  She also had some agitation and confusion and was briefly in restraints.  Dea is now calm, alert and oriented. [facility] is now recommended SNF. Conversation at bedside with her. She has no preferences for SNF. After some discussion as to facilities in network and their locations, her preferences are as follows:  #1  [facility]  #2  [facility]  #3  [facility].  This writer contacted Avel (liaison) CECILIO - she will review and CB.    Following.    Sheryl Leslie RN

## 2025-06-09 NOTE — PLAN OF CARE
Problem: Discharge Planning  Goal: Discharge to home or other facility with appropriate resources  6/9/2025 1248 by Megha Friend RN  Outcome: Progressing  6/8/2025 2311 by Ashish De Jesus RN  Outcome: Progressing     Problem: Pain  Goal: Verbalizes/displays adequate comfort level or baseline comfort level  6/9/2025 1248 by Megha Friend RN  Outcome: Progressing  6/8/2025 2311 by Ashish De Jesus RN  Outcome: Progressing     Problem: Safety - Adult  Goal: Free from fall injury  6/9/2025 1248 by Megha Friend RN  Outcome: Progressing  6/8/2025 2311 by Ashish De Jesus RN  Outcome: Progressing     Problem: Safety - Medical Restraint  Goal: Remains free of injury from restraints (Restraint for Interference with Medical Device)  Description: INTERVENTIONS:1. Determine that other, less restrictive measures have been tried or would not be effective before applying the restraint2. Evaluate the patient's condition at the time of restraint application3. Inform patient/family regarding the reason for restraint4. Q2H: Monitor safety, psychosocial status, comfort, nutrition and hydration  6/9/2025 1248 by Megha Friend RN  Outcome: Progressing  6/8/2025 2311 by Ashish De Jesus RN  Outcome: Progressing     Problem: ABCDS Injury Assessment  Goal: Absence of physical injury  6/9/2025 1248 by Megha Friend RN  Outcome: Progressing  6/8/2025 2311 by Ashish De Jesus RN  Outcome: Progressing     Problem: Confusion  Goal: Confusion, delirium, dementia, or psychosis is improved or at baseline  Description: INTERVENTIONS:1. Assess for possible contributors to thought disturbance, including medications, impaired vision or hearing, underlying metabolic abnormalities, dehydration, psychiatric diagnoses, and notify attending LIP2. [place] high risk fall precautions, as indicated3. Provide frequent short contacts to provide reality reorientation, refocusing and direction4. Decrease environmental stimuli, including noise

## 2025-06-09 NOTE — TELEPHONE ENCOUNTER
Monitor company [employer]  Length of monitor 14 days  Monitor ordered by Dr. Tyler Martinez   Patch ID [id number]   Device number [id number]  Monitor given to Shiva Bailey RN   Nurse to apply at the time of discharge.

## 2025-06-09 NOTE — PROGRESS NOTES
Shift Summary    Admission Diagnosis: Chest pain due to CAD    Shift Events:  Patient was anxious saying that she gave her 2 friends $1000 to bank for her but they were unreachable given Xanax.Continue Lorsatan, Metoprolol on hold. For event monitor on Discharged &Cardiology FU clinic in 2-4 weeks. CT PTOPT.  Vitals:  Vitals:    06/09/25 0226 06/09/25 0328 06/09/25 0609 06/09/25 0624   BP: (!) 167/99 133/79     Pulse: 84   80   Resp:  18     Temp:  97.5 °F (36.4 °C)     TempSrc:  Oral     SpO2:  94%     Weight:   80.3 kg (177 lb)    Height:            WEIGHT: Admit Weight - Scale: 81.4 kg (179 lb 6.4 oz)      Today  Weight - Scale: 80.3 kg (177 lb)    Tele:  normal sinus     IV/Line:  Peripheral IV 06/03/25 Right Antecubital (Active)   Site Assessment Clean, dry & intact;Leaking 06/09/25 0600   Line Status Normal saline locked;Capped 06/09/25 0600   Line Care Connections checked and tightened 06/09/25 0600   Phlebitis Assessment No symptoms 06/09/25 0600   Infiltration Assessment 0 06/09/25 0600   Alcohol Cap Used Yes 06/09/25 0600   Dressing Status Clean, dry & intact 06/09/25 0600   Dressing Type Transparent 06/09/25 0600       Peripheral IV 06/06/25 Right;Anterior Forearm (Active)   Site Assessment Clean, dry & intact 06/09/25 0600   Line Status Normal saline locked;Capped;Flushed 06/09/25 0600   Line Care Connections checked and tightened 06/09/25 0600   Phlebitis Assessment No symptoms 06/09/25 0600   Infiltration Assessment 0 06/09/25 0600   Alcohol Cap Used Yes 06/09/25 0600   Dressing Status Clean, dry & intact 06/09/25 0600   Dressing Type Transparent 06/09/25 0600          Drains/Chen:       Output by Drain (mL) 06/07/25 0701 - 06/07/25 1900 06/07/25 1901 - 06/08/25 0700 06/08/25 0701 - 06/08/25 1900 06/08/25 1901 - 06/09/25 0629   Patient has no LDAs of requested type attached.       Neuro:   oriented to time, place, person and situation    I/O:   I/O this shift:  In: 440 [P.O.:440]  Out: 850 [Urine:850]

## 2025-06-10 VITALS
HEIGHT: 65 IN | TEMPERATURE: 97.8 F | HEART RATE: 89 BPM | DIASTOLIC BLOOD PRESSURE: 76 MMHG | RESPIRATION RATE: 16 BRPM | WEIGHT: 175.2 LBS | SYSTOLIC BLOOD PRESSURE: 117 MMHG | OXYGEN SATURATION: 98 % | BODY MASS INDEX: 29.19 KG/M2

## 2025-06-10 LAB
ANION GAP SERPL CALCULATED.3IONS-SCNC: 12 MMOL/L (ref 3–16)
BUN SERPL-MCNC: 14 MG/DL (ref 7–20)
CALCIUM SERPL-MCNC: 9.3 MG/DL (ref 8.3–10.6)
CHLORIDE SERPL-SCNC: 104 MMOL/L (ref 99–110)
CO2 SERPL-SCNC: 26 MMOL/L (ref 21–32)
CREAT SERPL-MCNC: 1.2 MG/DL (ref 0.6–1.2)
GFR SERPLBLD CREATININE-BSD FMLA CKD-EPI: 50 ML/MIN/{1.73_M2}
GLUCOSE SERPL-MCNC: 101 MG/DL (ref 70–99)
POTASSIUM SERPL-SCNC: 4.4 MMOL/L (ref 3.5–5.1)
SODIUM SERPL-SCNC: 142 MMOL/L (ref 136–145)

## 2025-06-10 PROCEDURE — 6370000000 HC RX 637 (ALT 250 FOR IP): Performed by: NURSE PRACTITIONER

## 2025-06-10 PROCEDURE — 97110 THERAPEUTIC EXERCISES: CPT

## 2025-06-10 PROCEDURE — 80048 BASIC METABOLIC PNL TOTAL CA: CPT

## 2025-06-10 PROCEDURE — 6370000000 HC RX 637 (ALT 250 FOR IP): Performed by: INTERNAL MEDICINE

## 2025-06-10 PROCEDURE — 36415 COLL VENOUS BLD VENIPUNCTURE: CPT

## 2025-06-10 PROCEDURE — 6360000002 HC RX W HCPCS: Performed by: NURSE PRACTITIONER

## 2025-06-10 PROCEDURE — 6370000000 HC RX 637 (ALT 250 FOR IP)

## 2025-06-10 PROCEDURE — 97116 GAIT TRAINING THERAPY: CPT

## 2025-06-10 RX ADMIN — LOSARTAN POTASSIUM 50 MG: 25 TABLET, FILM COATED ORAL at 09:26

## 2025-06-10 RX ADMIN — PANTOPRAZOLE SODIUM 40 MG: 40 TABLET, DELAYED RELEASE ORAL at 09:26

## 2025-06-10 RX ADMIN — VENLAFAXINE HYDROCHLORIDE 225 MG: 37.5 CAPSULE, EXTENDED RELEASE ORAL at 09:27

## 2025-06-10 RX ADMIN — POTASSIUM CHLORIDE 20 MEQ: 1500 TABLET, EXTENDED RELEASE ORAL at 09:26

## 2025-06-10 RX ADMIN — ASPIRIN 81 MG: 81 TABLET, COATED ORAL at 09:26

## 2025-06-10 RX ADMIN — ENOXAPARIN SODIUM 40 MG: 100 INJECTION SUBCUTANEOUS at 09:27

## 2025-06-10 RX ADMIN — RANOLAZINE 1000 MG: 500 TABLET, FILM COATED, EXTENDED RELEASE ORAL at 09:26

## 2025-06-10 NOTE — PROGRESS NOTES
Patient discharged via medical transport. Patient sent with personal belongings, heart monitor, and paperwork. Report given to transport staff.

## 2025-06-10 NOTE — DISCHARGE INSTR - COC
Continuity of Care Form    Patient Name: Dea Georges   :  1960  MRN:  [id number]    Admit date:  6/3/2025  Discharge date:  6/10/25    Code Status Order: Full Code   Advance Directives:     Admitting Physician:  Franky Youssef MD  PCP: Dorota Bee MD    Discharging Nurse: Aidee MORALES RN  Discharging Hospital Unit/Room#: 0435/0435-01  Discharging Unit Phone Number: [phone]    Emergency Contact:   Extended Emergency Contact Information  Primary Emergency Contact: Guillermo Georges  Home Phone: [phone]  Relation: Child   needed? No    Past Surgical History:  Past Surgical History:   Procedure Laterality Date    APPENDECTOMY      CORONARY ANGIOPLASTY WITH STENT PLACEMENT Left 2018    LIPOMA RESECTION Right     MA LAPAROSCOPY SURG CHOLECYSTECTOMY N/A 2018    CHOLECYSTECTOMY LAPAROSCOPIC ROBOTIC performed by [facility] at [facility] OR       Immunization History:   Immunization History   Administered Date(s) Administered    COVID-19, PFIZER PURPLE top, DILUTE for use, (age 12 y+), 30mcg/0.3mL 2021, 2021    Hepatitis A 2008, 2008    Influenza Vaccine, unspecified formulation 2014    Influenza Virus Vaccine 09/10/2015, 2017, 10/22/2018    Influenza, AFLURIA (age 3 y+), FLUZONE, (age 6 mo+), Quadv MDV, 0.5mL 2003, 2011, 09/10/2015    Pneumococcal, PPSV23, PNEUMOVAX 23, (age 2y+), SC/IM, 0.5mL 09/10/2015    TDaP, ADACEL (age 10y-64y), BOOSTRIX (age 10y+), IM, 0.5mL 2013       Active Problems:  Patient Active Problem List   Diagnosis Code    Contusion, elbow S50.00XA    Chest pain, atypical R07.89    Tobacco abuse disorder Z72.0    Schatzki's ring K22.2    HTN (hypertension) I10    Mild intermittent asthma without complication J45.20    RUQ abdominal pain R10.11    Cholecystitis K81.9    Hypokalemia E87.6    Hypocalcemia E83.51    S/P cholecystectomy Z90.49    Chest pain R07.9    Coronary artery disease involving native  coronary artery of native heart with angina pectoris I25.119    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, 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    Ischemic cardiomyopathy I25.5    Chest pain on exertion R07.9    Chest pain due to CAD I25.119    Abnormal stress test R94.39    Hypotension I95.9       Isolation/Infection:   Isolation            No Isolation          Patient Infection Status    None to display              Nurse Assessment:  Last Vital Signs: /66   Pulse 82   Temp 97.8 °F (36.6 °C) (Oral)   Resp 16   Ht 1.651 m (5' 5\")   Wt 79.5 kg (175 lb 3.2 oz)   SpO2 96%   BMI 29.15 kg/m²     Last documented pain score (0-10 scale): Pain Level: 0  Last Weight:   Wt Readings from Last 1 Encounters:   06/10/25 79.5 kg (175 lb 3.2 oz)     Mental Status:  oriented, alert, coherent, logical, thought processes intact, and able to concentrate and follow conversation    IV Access:  - None    Nursing Mobility/ADLs:  Walking   Independent  Transfer  Independent  Bathing  Assisted  Dressing  Assisted  Toileting  Independent  Feeding  Independent  Med Admin  Independent  Med Delivery   whole    Wound Care Documentation and Therapy:  Incision 02/24/18 Abdomen Right (Active)   Number of days: 2663       Incision 02/24/18 Umbilicus (Active)   Number of days: 2663       Incision 02/24/18 Abdomen Left (Active)   Number of days: 2663        Elimination:  Continence:   Bowel: Yes  Bladder: Yes  Urinary Catheter: None   Colostomy/Ileostomy/Ileal Conduit: No       Date of Last BM: 6/10    Intake/Output Summary (Last 24 hours) at 6/10/2025 1322  Last data filed at 6/10/2025 1022  Gross per 24 hour   Intake --   Output 900 ml   Net -900 ml     I/O last 3 completed shifts:  In: 440 [P.O.:440]  Out: 1900 [Urine:1900]    Safety Concerns:     At Risk for Falls    Impairments/Disabilities:      None    Nutrition Therapy:  Current Nutrition Therapy:   - Oral Diet:  General    Routes of Feeding: Oral  Liquids: Thin Liquids  Daily

## 2025-06-10 NOTE — CARE COORDINATION
CASE MANAGEMENT DISCHARGE SUMMARY      Discharge to: [facility]    Precertification completed: yes  Hospital Exemption Notification (HENS) completed: HENS ID: [id number]     IMM given: 6/9/25       Transportation:        Medical Transport explained to pt/family. Pt/family voice no agency preference.    Agency used:[facility]   time:1700   Ambulance form completed: Yes    Confirmed discharge plan with: patient and son Guillermo who is at bedside        Facility/Agency, name:  [facility] faxed  [phone]   Phone number for report to facility: [phone]     RN, name: Aidee    Note: Discharging nurse to complete [facility], reconcile [facility], and place final copy with patient's discharge packet. RN to ensure that written prescriptions for  Level II medications are sent with patient to the facility as per protocol.    Sheryl Leslie, RN

## 2025-06-10 NOTE — CARE COORDINATION
Avel (liaison) [facility] and [facility] here to discuss SNF preference with patient at bedside. Dea has decided upon [facility] as her preference. Avel will initiate pre cert.  LM for son Guillermo to include him in discharge plan.      Sheryl Leslie RN

## 2025-06-10 NOTE — DISCHARGE SUMMARY
Hospital Medicine Discharge Summary    Patient: Dea Georges   : 1960     Hospital:  [facility]  Admit Date: 6/3/2025   Discharge Date:   6/10/2025  Disposition:  SNF - [facility]   Code status:  Full  Condition at Discharge: Stable  Primary Care Provider: Dorota Bee MD    Admitting Provider: Franky Youssef MD  Discharge Provider: Franky Youssef MD     Discharge Diagnoses:      Active Hospital Problems    Diagnosis     Hypotension [I95.9]     Chest pain due to CAD [I25.119]     Abnormal stress test [R94.39]        Presenting Admission History:      64 y.o. female who presented to [facility] with chest pain.  PMHx significant for hypertension, CAD, hyperlipidemia, GERD.  History obtained from the patient and review of EMR.  Patient stated she has been experiencing intermittent chest pain for the last 3 days.  She describes the pain as midsternal, sharp and radiates under her left breast.  She does report associated shortness of breath, especially on exertion.  Patient stated she does have a history of cardiac disease and does have 3 stents placed.  She reports prior to going to the emergency department this evening that her chest pain was more of a \"tightness.  The patient stated she took some nitroglycerin which she did get some relief. In the emergency department the patient had a negative troponin x 2 as well as a nonischemic EKG.  She was given aspirin and nitro ointment was placed to chest wall.  Upon further evaluation, the patient was found to be hypokalemic.  This was replaced.  She was admitted for further evaluation and treatment.      Assessment/Plan:      Chest Pain   - Initially concerning for ACS but possibly secondary to CAD w/ chronic angina.    - Troponin not elevated.    - EKG non-ischemic.    - Hx of RCA PCI (2019). LHC 2024 at [facility] which demonstrated patent stents in the RCA and diagonal.   - NM Stress Test at [facility] 2024  effort.   Cardiovascular:  Regular rate and rhythm.  Abdomen:  Soft, non-tender, non-distended.  Musculoskeletal:  No edema  Neurologic:  Non-focal  Psychiatric:  Alert and oriented    Patient Discharge Instructions:      Follow up:    1.  Primary Care Provider Dorota Bee MD in the next 1-2 weeks.      The patient was seen and examined on day of discharge and this discharge summary is in conjunction with any daily progress note from day of discharge. Time spent on discharge: 35 minutes in the examination, evaluation, counseling and review of medications and discharge plan.    ------------------------------------------------------------------------------------------------------------------------------------------------------    Discharge Medications:   Current Discharge Medication List        Current Discharge Medication List        Current Discharge Medication List        CONTINUE these medications which have NOT CHANGED    Details   losartan (COZAAR) 100 MG tablet Take 0.5 tablets by mouth in the morning and at bedtime      atorvastatin (LIPITOR) 80 MG tablet Take 1 tablet by mouth nightly  Qty: 30 tablet, Refills: 3      nitroGLYCERIN (NITROSTAT) 0.4 MG SL tablet up to max of 3 total doses. If no relief after 1 dose, call 911.  Qty: 25 tablet, Refills: 3      venlafaxine 225 MG extended release tablet Take 1 tablet by mouth daily (with breakfast)      aspirin 81 MG tablet Take 1 tablet by mouth daily      albuterol sulfate HFA (VENTOLIN HFA) 108 (90 Base) MCG/ACT inhaler Inhale 2 puffs into the lungs 4 times daily as needed for Shortness of Breath  Qty: 18 g, Refills: 0      ondansetron (ZOFRAN-ODT) 4 MG disintegrating tablet Take 1 tablet by mouth 3 times daily as needed for Nausea or Vomiting  Qty: 21 tablet, Refills: 0      pantoprazole (PROTONIX) 40 MG tablet Take 1 tablet by mouth in the morning and at bedtime  Qty: 60 tablet, Refills: 0      potassium chloride (KLOR-CON M) 20 MEQ extended

## 2025-06-10 NOTE — PROGRESS NOTES
Report given to ROSA Geronimo at the [facility].  time planned for 5 pm for patient. LDAs removed. Tele monitor off and personal belongings gathered. Event monitor with patient.

## 2025-06-10 NOTE — PROGRESS NOTES
Physical Therapy  Facility/Department: [facility] C4 PCU  Daily Treatment Note  NAME: Dea Georges  : 1960  MRN: [id number]    Date of Service: 6/10/2025    Discharge Recommendations:  Subacute/Skilled Nursing Facility   PT Equipment Recommendations  Equipment Needed: No  Other: defer to next level of care.    Therapy discharge recommendations take into account each patient's current medical complexities and are subject to input/oversight from the patient's healthcare team.   Barriers to Home Discharge:   [x] Steps to access home entry or bed/bath:   [x] Unable to transfer, ambulate, or propel wheelchair household distances without assist   [x] Unable to perform ADLs without assist   [x] Limited available assist at home upon discharge    [] Patient or family requests d/c to post-acute facility    [] Poor cognition/safety awareness for d/c to home alone    [] Unable to maintain ordered weight bearing status    [] Patient with salient signs of long-standing immobility   [x] Other: pt with notable fatigue with mobility, pt at increased risk for falls, no assist at home.    Patient Diagnosis(es): The primary encounter diagnosis was Chest pain, unspecified type. Diagnoses of Hypokalemia, Chronic coronary microvascular dysfunction, Abnormal stress test, and Bradycardia were also pertinent to this visit.    Assessment  Assessment: Pt perfoming better with mobility needing CGA for amb with RW up to 45' but refusing more amb due to fatigue and pt stating \"thats enough\".  Pt demos no signs of fatigue: SOB, legs weakening etc. but wants to sit down.  All vital signs WNL.  Pt perticipated in BLE exs in chair 5-7 reps limted again by pt stating \"thats enough of that\".  Pt vauge when asked to elaborate.  Pt is recommended for con't skilled PT and SNF at D/C.  Activity Tolerance: Patient tolerated evaluation without incident;Patient limited by fatigue;Patient limited by endurance  Equipment Needed: No  Other: defer to next  1200         Minutes 25                 Rosi Dahl, PTA#[id number]

## 2025-06-10 NOTE — CARE COORDINATION
This writer received a call from Avel (liaison) [facility]. Pre cert is approved. Dr Youssef is aware and planning on dc later today.    Sheryl Leslie RN

## 2025-06-12 NOTE — TELEPHONE ENCOUNTER
Patient was discharged on 6/10/25. Aidee RN note states monitor was sent with the patient.  I have put a call out to [facility]  # [phone] to see if the patient has the monitor and if it can be started.  Awaiting a call back.    6/13/25 I did not receive a call back yesterday.  I called again today and spoke to her nurse who had someone check the patient's room.  No box was found in her room.  She will check herself and let me know if she finds the monitor.  ***

## 2025-06-16 ENCOUNTER — ANCILLARY PROCEDURE (OUTPATIENT)
Dept: CARDIOLOGY CLINIC | Age: 65
End: 2025-06-16

## 2025-06-16 ENCOUNTER — TELEPHONE (OUTPATIENT)
Dept: CARDIOLOGY CLINIC | Age: 65
End: 2025-06-16

## 2025-06-16 DIAGNOSIS — R00.1 BRADYCARDIA: ICD-10-CM

## 2025-06-16 NOTE — TELEPHONE ENCOUNTER
Monitor placed by MAILED  Monitor company [employer]  Length of monitor 14DAYS  Monitor ordered by RUSS  Phone ID MAILED   First Patch ID MAILED  Activation successful prior to pt leaving office? MAILED

## 2025-06-18 LAB — ECHO BSA: 1.93 M2

## 2025-07-17 ENCOUNTER — RESULTS FOLLOW-UP (OUTPATIENT)
Dept: CARDIOLOGY CLINIC | Age: 65
End: 2025-07-17

## (undated) PROCEDURE — 4A023N7 MEASUREMENT OF CARDIAC SAMPLING AND PRESSURE, LEFT HEART, PERCUTANEOUS APPROACH: ICD-10-PCS

## (undated) DEVICE — Z INACTIVE USE 2653177 SPONGE GZ W2XL2IN NONWOVEN 4 PLY FASTER WICKING ABIL AVANT

## (undated) DEVICE — SOLUTION ANTIFOG VIS SYS CLEARIFY LAPSCP

## (undated) DEVICE — COVER,TABLE,60X90,STERILE: Brand: MEDLINE

## (undated) DEVICE — Z INACTIVE USE 2392665 BLADE LARYNGOSCOPE 4 GLIDESCOPE GVL STAT DISP

## (undated) DEVICE — OBTURATOR ROBOTIC DIA8MM BLDELSS ENDOSCP DISP DA VINCI SI

## (undated) DEVICE — TROCAR: Brand: KII FIOS FIRST ENTRY

## (undated) DEVICE — PENCIL ES L3M BTTN SWCH HOLSTER W/ BLDE ELECTRD EDGE

## (undated) DEVICE — KIT DRP 3 ARM ACC DISP ENDOWRIST DA VINCI SI

## (undated) DEVICE — DISPOSABLE LAPAROSCOPIC CORDS, 1 PER POUCH: Brand: A&E MEDICAL / DISPOSABLE LAPAROSCOPIC CORDS

## (undated) DEVICE — 20 ML SYRINGE REGULAR TIP: Brand: MONOJECT

## (undated) DEVICE — CANNULA SEAL

## (undated) DEVICE — 3M™ STERI-STRIP™ WOUND CLOSURE SYSTEMS 5 EACH/PACK 25 PACKS/CARTON 4 CARTONS/CASE W8516: Brand: 3M™ STERI-STRIP™

## (undated) DEVICE — ST CHARLES GEN LAPAROSCOPY PK: Brand: MEDLINE INDUSTRIES, INC.

## (undated) DEVICE — COVER,MAYO STAND,XL,STERILE: Brand: MEDLINE

## (undated) DEVICE — GOWN,AURORA,NONREINFORCED,LARGE: Brand: MEDLINE

## (undated) DEVICE — SUTURE PDS II SZ 0 L27IN ABSRB VLT UR-6 L26MM 1/2 CIR D7185

## (undated) DEVICE — Z INACTIVE USE 2641839 CLIP INT M L POLYMER LOK LIG HEM O LOK

## (undated) DEVICE — SHEARS ENDOSCP L36CM DIA5MM ULTRASONIC CRV TIP ADAPTIVE

## (undated) DEVICE — 3M™ WARMING BLANKET, UPPER BODY, 10 PER CASE, 42268: Brand: BAIR HUGGER™

## (undated) DEVICE — SUTURE MCRYL + SZ 4-0 L27IN ABSRB UD L19MM PS-2 3/8 CIR MCP426H

## (undated) DEVICE — TROCARS: Brand: KII® BALLOON BLUNT TIP SYSTEM

## (undated) DEVICE — Z DISCONTINUED GLOVE SURG SZ 7.5 L12IN FNGR THK13MIL WHT ISOLEX